# Patient Record
Sex: FEMALE | Race: BLACK OR AFRICAN AMERICAN | NOT HISPANIC OR LATINO | Employment: UNEMPLOYED | URBAN - NONMETROPOLITAN AREA
[De-identification: names, ages, dates, MRNs, and addresses within clinical notes are randomized per-mention and may not be internally consistent; named-entity substitution may affect disease eponyms.]

---

## 2023-04-17 DIAGNOSIS — D50.9 IRON DEFICIENCY ANEMIA, UNSPECIFIED IRON DEFICIENCY ANEMIA TYPE: Primary | ICD-10-CM

## 2023-04-19 ENCOUNTER — CONSULT (OUTPATIENT)
Dept: ONCOLOGY | Facility: CLINIC | Age: 28
End: 2023-04-19
Payer: COMMERCIAL

## 2023-04-19 ENCOUNTER — LAB (OUTPATIENT)
Dept: LAB | Facility: HOSPITAL | Age: 28
End: 2023-04-19
Payer: COMMERCIAL

## 2023-04-19 VITALS
WEIGHT: 21.8 LBS | HEART RATE: 90 BPM | BODY MASS INDEX: 3.5 KG/M2 | RESPIRATION RATE: 16 BRPM | SYSTOLIC BLOOD PRESSURE: 126 MMHG | DIASTOLIC BLOOD PRESSURE: 82 MMHG | OXYGEN SATURATION: 98 % | TEMPERATURE: 97.4 F | HEIGHT: 66 IN

## 2023-04-19 DIAGNOSIS — D50.9 IRON DEFICIENCY ANEMIA, UNSPECIFIED IRON DEFICIENCY ANEMIA TYPE: ICD-10-CM

## 2023-04-19 DIAGNOSIS — N92.0 EXCESSIVE AND FREQUENT MENSTRUATION: ICD-10-CM

## 2023-04-19 DIAGNOSIS — D50.9 IRON DEFICIENCY ANEMIA, UNSPECIFIED IRON DEFICIENCY ANEMIA TYPE: Primary | ICD-10-CM

## 2023-04-19 PROBLEM — R59.0 CERVICAL LYMPHADENOPATHY: Status: ACTIVE | Noted: 2022-06-14

## 2023-04-19 LAB
ALBUMIN SERPL-MCNC: 4.2 G/DL (ref 3.5–5.2)
ALBUMIN/GLOB SERPL: 1.3 G/DL
ALP SERPL-CCNC: 65 U/L (ref 39–117)
ALT SERPL W P-5'-P-CCNC: 7 U/L (ref 1–33)
ANION GAP SERPL CALCULATED.3IONS-SCNC: 10 MMOL/L (ref 5–15)
ANISOCYTOSIS BLD QL: ABNORMAL
AST SERPL-CCNC: 13 U/L (ref 1–32)
BILIRUB SERPL-MCNC: 0.6 MG/DL (ref 0–1.2)
BUN SERPL-MCNC: 6 MG/DL (ref 6–20)
BUN/CREAT SERPL: 10.3 (ref 7–25)
BURR CELLS BLD QL SMEAR: ABNORMAL
CALCIUM SPEC-SCNC: 9.1 MG/DL (ref 8.6–10.5)
CHLORIDE SERPL-SCNC: 103 MMOL/L (ref 98–107)
CO2 SERPL-SCNC: 24 MMOL/L (ref 22–29)
CREAT SERPL-MCNC: 0.58 MG/DL (ref 0.57–1)
DEPRECATED RDW RBC AUTO: 59.3 FL (ref 37–54)
EGFRCR SERPLBLD CKD-EPI 2021: 129 ML/MIN/1.73
EOSINOPHIL # BLD MANUAL: 0.04 10*3/MM3 (ref 0–0.4)
EOSINOPHIL NFR BLD MANUAL: 1 % (ref 0.3–6.2)
ERYTHROCYTE [DISTWIDTH] IN BLOOD BY AUTOMATED COUNT: 22.8 % (ref 12.3–15.4)
FERRITIN SERPL-MCNC: 24.09 NG/ML (ref 13–150)
GIANT PLATELETS: ABNORMAL
GLOBULIN UR ELPH-MCNC: 3.3 GM/DL
GLUCOSE SERPL-MCNC: 83 MG/DL (ref 65–99)
HCT VFR BLD AUTO: 42.9 % (ref 34–46.6)
HGB BLD-MCNC: 12.3 G/DL (ref 12–15.9)
HOLD SPECIMEN: NORMAL
IRON 24H UR-MRATE: 121 MCG/DL (ref 37–145)
IRON SATN MFR SERPL: 26 % (ref 20–50)
LYMPHOCYTES # BLD MANUAL: 2.52 10*3/MM3 (ref 0.7–3.1)
LYMPHOCYTES NFR BLD MANUAL: 5.1 % (ref 5–12)
MCH RBC QN AUTO: 21.3 PG (ref 26.6–33)
MCHC RBC AUTO-ENTMCNC: 28.7 G/DL (ref 31.5–35.7)
MCV RBC AUTO: 74.4 FL (ref 79–97)
MICROCYTES BLD QL: ABNORMAL
MONOCYTES # BLD: 0.22 10*3/MM3 (ref 0.1–0.9)
NEUTROPHILS # BLD AUTO: 1.62 10*3/MM3 (ref 1.7–7)
NEUTROPHILS NFR BLD MANUAL: 36.7 % (ref 42.7–76)
PLATELET # BLD AUTO: 407 10*3/MM3 (ref 140–450)
PMV BLD AUTO: 9.2 FL (ref 6–12)
POIKILOCYTOSIS BLD QL SMEAR: ABNORMAL
POLYCHROMASIA BLD QL SMEAR: ABNORMAL
POTASSIUM SERPL-SCNC: 3.8 MMOL/L (ref 3.5–5.2)
PROT SERPL-MCNC: 7.5 G/DL (ref 6–8.5)
RBC # BLD AUTO: 5.77 10*6/MM3 (ref 3.77–5.28)
SODIUM SERPL-SCNC: 137 MMOL/L (ref 136–145)
TIBC SERPL-MCNC: 465 MCG/DL (ref 298–536)
TRANSFERRIN SERPL-MCNC: 312 MG/DL (ref 200–360)
VARIANT LYMPHS NFR BLD MANUAL: 4.1 % (ref 0–5)
VARIANT LYMPHS NFR BLD MANUAL: 53.1 % (ref 19.6–45.3)
WBC MORPH BLD: NORMAL
WBC NRBC COR # BLD: 4.41 10*3/MM3 (ref 3.4–10.8)
WHOLE BLOOD HOLD SPECIMEN: NORMAL
WHOLE BLOOD HOLD SPECIMEN: NORMAL

## 2023-04-19 PROCEDURE — 84466 ASSAY OF TRANSFERRIN: CPT

## 2023-04-19 PROCEDURE — 85007 BL SMEAR W/DIFF WBC COUNT: CPT

## 2023-04-19 PROCEDURE — 85025 COMPLETE CBC W/AUTO DIFF WBC: CPT

## 2023-04-19 PROCEDURE — 83540 ASSAY OF IRON: CPT

## 2023-04-19 PROCEDURE — 80053 COMPREHEN METABOLIC PANEL: CPT

## 2023-04-19 PROCEDURE — 82728 ASSAY OF FERRITIN: CPT

## 2023-04-19 PROCEDURE — 36415 COLL VENOUS BLD VENIPUNCTURE: CPT

## 2023-04-19 RX ORDER — SEMAGLUTIDE 1.34 MG/ML
INJECTION, SOLUTION SUBCUTANEOUS
COMMUNITY

## 2023-04-19 RX ORDER — METFORMIN HYDROCHLORIDE 500 MG/1
TABLET, EXTENDED RELEASE ORAL
COMMUNITY

## 2023-04-19 RX ORDER — BUPROPION HYDROCHLORIDE 300 MG/1
TABLET ORAL
COMMUNITY
Start: 2023-03-01

## 2023-04-19 RX ORDER — PSEUDOEPHEDRINE HCL 30 MG
TABLET ORAL
COMMUNITY

## 2023-04-19 RX ORDER — FERROUS SULFATE 325(65) MG
TABLET ORAL
COMMUNITY

## 2023-04-19 RX ORDER — ONDANSETRON HYDROCHLORIDE 8 MG/1
TABLET, FILM COATED ORAL
COMMUNITY
Start: 2023-03-13

## 2023-04-19 NOTE — PROGRESS NOTES
"[facility] HEMATOLOGY & ONCOLOGY [place]  [address]  [facility] [address] [phone]    Patient Name: Tim Saenz  Encounter Date: 04/19/2023   YOB: 1995  Patient Number: [id number]    Initial Note    REASON FOR CONSULTATION: Patient states \" I had critically low iron.  Started iron and 6 weeks later it was still low  \".    HISTORY OF PRESENT ILLNESS: Tim Saenz is a 27 y.o. female referred by Kimberley Vanegas* for diagnostic and management recommendations for anemia. History is obtained from patient. History is considered to be accurate.    She has health history significant for DM and iron deficiency, depression/anxiety. She also reports frequent menstruation.     Previous labs reviewed 03/11/2023.  Hgb  10.9, Hct 37.6, Plt 430  1/29/23:   TIBC 456, Iron 20, Iron Sat 4, Ferritin 6    She had labs drawn today and results were reviewed with her in office.     LABS    Lab Results - Last 18 Months   Lab Units 04/19/23  0840   HEMOGLOBIN g/dL 12.3   HEMATOCRIT % 42.9   MCV fL 74.4*   WBC 10*3/mm3 4.41   RDW % 22.8*   MPV fL 9.2   PLATELETS 10*3/mm3 407   NEUTROS ABS 10*3/mm3 1.62*   EOS ABS 10*3/mm3 0.04   NEUTROPHIL % % 36.7*   MONOCYTES % % 5.1   ATYP LYMPH % % 4.1   ANISOCYTOSIS  Slight/1+   GIANT PLT  Slight/1+       Lab Results - Last 18 Months   Lab Units 04/19/23  0840   GLUCOSE mg/dL 83   SODIUM mmol/L 137   POTASSIUM mmol/L 3.8   CO2 mmol/L 24.0   CHLORIDE mmol/L 103   ANION GAP mmol/L 10.0   CREATININE mg/dL 0.58   BUN mg/dL 6   BUN / CREAT RATIO  10.3   CALCIUM mg/dL 9.1   ALK PHOS U/L 65   TOTAL PROTEIN g/dL 7.5   ALT (SGPT) U/L 7   AST (SGOT) U/L 13   BILIRUBIN mg/dL 0.6   ALBUMIN g/dL 4.2   GLOBULIN gm/dL 3.3       No results for input(s): MSPIKE, KAPPALAMB, IGLFLC, URICACID, FREEKAPPAL, CEA, LDH, REFLABREPO in the last 96046 hours.    Lab Results - Last 18 Months   Lab Units 04/19/23  0840   IRON mcg/dL " 121   TIBC mcg/dL 465   IRON SATURATION % 26   FERRITIN ng/mL 24.09         PAST MEDICAL HISTORY:  ALLERGIES:  Allergies   Allergen Reactions   • Naproxen GI Intolerance     CURRENT MEDICATIONS:  Outpatient Encounter Medications as of 4/19/2023   Medication Sig Dispense Refill   • buPROPion XL (WELLBUTRIN XL) 300 MG 24 hr tablet      • docusate sodium 100 MG capsule docusate sodium 100 mg capsule   Take 2 capsules every day by oral route for 30 days.     • ferrous sulfate 325 (65 FE) MG tablet FeroSul 325 mg (65 mg iron) tablet     • metFORMIN ER (GLUCOPHAGE-XR) 500 MG 24 hr tablet metformin  mg tablet,extended release 24 hr   Take 1 tablet every day by oral route for 90 days.     • ondansetron (ZOFRAN) 8 MG tablet      • Semaglutide, 1 MG/DOSE, (Ozempic, 1 MG/DOSE,) 4 MG/3ML solution pen-injector Ozempic 1 mg/dose (4 mg/3 mL) subcutaneous pen injector   Inject by subcutaneous route for 28 days.       No facility-administered encounter medications on file as of 4/19/2023.     ADULT ILLNESSES:  Patient Active Problem List   Diagnosis Code   • Cervical lymphadenopathy R59.0   • Excessive and frequent menstruation N92.0       HEALTH MAINTENANCE ITEMS:  Health Maintenance Due   Topic Date Due   • URINE MICROALBUMIN  Never done   • Pneumococcal Vaccine 0-64 (1 - PCV) Never done   • HPV VACCINES (1 - 2-dose series) Never done   • TDAP/TD VACCINES (1 - Tdap) Never done   • COVID-19 Vaccine (3 - Booster for Moderna series) 09/24/2021   • HEPATITIS C SCREENING  Never done   • ANNUAL PHYSICAL  Never done   • DIABETIC FOOT EXAM  Never done   • PAP SMEAR  Never done   • HEMOGLOBIN A1C  Never done   • DIABETIC EYE EXAM  Never done       <no information>  Last Completed Colonoscopy     This patient has no relevant Health Maintenance data.          There is no immunization history on file for this patient.  Last Completed Mammogram     This patient has no relevant Health Maintenance data.            FAMILY HISTORY:  History  "reviewed. No pertinent family history.  SOCIAL HISTORY:  Social History     Socioeconomic History   • Marital status: Single   Tobacco Use   • Smoking status: Never   • Smokeless tobacco: Never   Vaping Use   • Vaping Use: Never used   Substance and Sexual Activity   • Alcohol use: Yes     Comment: OCCASIONALLY   • Drug use: Never   • Sexual activity: Defer       REVIEW OF SYSTEMS:  Review of Systems   Constitutional: Negative for fatigue and fever.        \"I feel alright\"   HENT: Negative for trouble swallowing.    Respiratory: Negative for cough and shortness of breath.    Cardiovascular: Negative for chest pain, palpitations and leg swelling.   Gastrointestinal: Negative for nausea and vomiting.   Genitourinary: Positive for menstrual problem (Menorrhagia. ). Negative for hematuria.        Fibroids   Musculoskeletal: Negative for arthralgias and myalgias.   Skin: Negative for rash, skin lesions and wound.   Neurological: Negative for dizziness, syncope, memory problem and confusion.   Psychiatric/Behavioral: Positive for depressed mood. Negative for suicidal ideas. The patient is nervous/anxious.        /82   Pulse 90   Temp 97.4 °F (36.3 °C)   Resp 16   Ht 167.6 cm (66\")   Wt 9.888 kg (21 lb 12.8 oz)   SpO2 98%   BMI 3.52 kg/m²  Body surface area is 0.78 meters squared.    Pain Score    04/19/23 0849   PainSc: 0-No pain       Physical Exam:  Physical Exam  Constitutional:       Appearance: Normal appearance.   HENT:      Head: Normocephalic and atraumatic.   Cardiovascular:      Rate and Rhythm: Normal rate and regular rhythm.   Pulmonary:      Effort: Pulmonary effort is normal.      Breath sounds: Normal breath sounds.   Abdominal:      General: Bowel sounds are normal.      Palpations: Abdomen is soft.   Musculoskeletal:      Right lower leg: No edema.      Left lower leg: No edema.   Skin:     General: Skin is warm and dry.   Neurological:      Mental Status: She is alert and oriented to person, " place, and time.   Psychiatric:         Attention and Perception: Attention normal.         Mood and Affect: Mood normal.         Judgment: Judgment normal.         Tim Saenz reports a pain score of 0.  Given her pain assessment as noted, treatment options were discussed and the following options were decided upon as a follow-up plan to address the patient's pain: No intervention indicated..      ASSESSMENT / PLAN:    1. Iron deficiency anemia, unspecified iron deficiency anemia type    2. Excessive and frequent menstruation       1.  Iron Deficiency   -Pt is taking oral iron   -Hgb 12.3, Hct 42.9   -Iron 121, Ferritin 24,  Sat 2615, TIBC 465  -Continue current dose     2.  Menorrhagia  -Has appt with GYN May 1, 2023       PLAN:   1.   regarding the reason for the referral.   2.   regarding iron deficiency anemia   3.  Pt will continue oral iron once daily    4.  Continue current medications, follow up, treatment plans per PCP and any other provider.   5.  Return to office 6 weeks with pre office labs for CBC, CMP, Iron profile and Ferritin.  Orders have been placed   6.  Care discussed with patient.  Understanding expressed.  Patient agreeable with plan.      Thank you for the referral.    I spent 35 minutes caring for Tim on this date of service. This time includes time spent by me in the following activities: preparing for the visit, reviewing tests, performing a medically appropriate examination and/or evaluation, counseling and educating the patient/family/caregiver, ordering medications, tests, or procedures, documenting information in the medical record and care coordination.       Cecy Jin, APRN  04/19/2023

## 2023-04-24 ENCOUNTER — PATIENT ROUNDING (BHMG ONLY) (OUTPATIENT)
Dept: ONCOLOGY | Facility: CLINIC | Age: 28
End: 2023-04-24
Payer: COMMERCIAL

## 2023-04-24 NOTE — PROGRESS NOTES
April 24, 2023    Hello, may I speak with Tim Marquez?    My name is CLAIRE    I am  with [facility] HEMATOLOGY & ONCOLOGY [address]  [facility] [address] [phone].    Before we get started may I verify your date of birth? 1995    Rigoberto MARQUEZ. My name is Claire and I am calling from [facility]ematology/[facility]   DALIA BECKWITH’S office. I wanted to welcome you to our practice and ensure that your first visit went smoothly. If you have any questions or concerns, feel free to reach out to our practice manager Crystal directly at [phone]. Thank you, and have a great day!

## 2023-05-31 ENCOUNTER — DOCUMENTATION (OUTPATIENT)
Dept: PASTORAL CARE | Facility: HOSPITAL | Age: 28
End: 2023-05-31

## 2023-05-31 ENCOUNTER — OFFICE VISIT (OUTPATIENT)
Dept: ONCOLOGY | Facility: CLINIC | Age: 28
End: 2023-05-31

## 2023-05-31 ENCOUNTER — LAB (OUTPATIENT)
Dept: LAB | Facility: HOSPITAL | Age: 28
End: 2023-05-31

## 2023-05-31 VITALS
HEIGHT: 66 IN | BODY MASS INDEX: 34.27 KG/M2 | TEMPERATURE: 97.3 F | OXYGEN SATURATION: 98 % | HEART RATE: 100 BPM | WEIGHT: 213.2 LBS | SYSTOLIC BLOOD PRESSURE: 118 MMHG | DIASTOLIC BLOOD PRESSURE: 84 MMHG | RESPIRATION RATE: 16 BRPM

## 2023-05-31 DIAGNOSIS — D72.819 LEUKOPENIA, UNSPECIFIED TYPE: ICD-10-CM

## 2023-05-31 DIAGNOSIS — D50.8 OTHER IRON DEFICIENCY ANEMIA: Primary | ICD-10-CM

## 2023-05-31 DIAGNOSIS — D50.9 IRON DEFICIENCY ANEMIA, UNSPECIFIED IRON DEFICIENCY ANEMIA TYPE: Primary | ICD-10-CM

## 2023-05-31 DIAGNOSIS — N92.1 MENORRHAGIA WITH IRREGULAR CYCLE: ICD-10-CM

## 2023-05-31 DIAGNOSIS — D50.9 IRON DEFICIENCY ANEMIA, UNSPECIFIED IRON DEFICIENCY ANEMIA TYPE: ICD-10-CM

## 2023-05-31 LAB
ALBUMIN SERPL-MCNC: 4.1 G/DL (ref 3.5–5.2)
ALBUMIN/GLOB SERPL: 1.2 G/DL
ALP SERPL-CCNC: 66 U/L (ref 39–117)
ALT SERPL W P-5'-P-CCNC: 6 U/L (ref 1–33)
ANION GAP SERPL CALCULATED.3IONS-SCNC: 8 MMOL/L (ref 5–15)
AST SERPL-CCNC: 11 U/L (ref 1–32)
BASOPHILS # BLD AUTO: 0.05 10*3/MM3 (ref 0–0.2)
BASOPHILS NFR BLD AUTO: 1.6 % (ref 0–1.5)
BILIRUB SERPL-MCNC: 0.6 MG/DL (ref 0–1.2)
BUN SERPL-MCNC: 5 MG/DL (ref 6–20)
BUN/CREAT SERPL: 8.2 (ref 7–25)
CALCIUM SPEC-SCNC: 8.8 MG/DL (ref 8.6–10.5)
CHLORIDE SERPL-SCNC: 106 MMOL/L (ref 98–107)
CO2 SERPL-SCNC: 25 MMOL/L (ref 22–29)
CREAT SERPL-MCNC: 0.61 MG/DL (ref 0.57–1)
DEPRECATED RDW RBC AUTO: 47.6 FL (ref 37–54)
EGFRCR SERPLBLD CKD-EPI 2021: 127.4 ML/MIN/1.73
EOSINOPHIL # BLD AUTO: 0.08 10*3/MM3 (ref 0–0.4)
EOSINOPHIL NFR BLD AUTO: 2.6 % (ref 0.3–6.2)
ERYTHROCYTE [DISTWIDTH] IN BLOOD BY AUTOMATED COUNT: 17 % (ref 12.3–15.4)
FERRITIN SERPL-MCNC: 9.64 NG/ML (ref 13–150)
GLOBULIN UR ELPH-MCNC: 3.3 GM/DL
GLUCOSE SERPL-MCNC: 87 MG/DL (ref 65–99)
HCT VFR BLD AUTO: 42 % (ref 34–46.6)
HGB BLD-MCNC: 12.7 G/DL (ref 12–15.9)
HOLD SPECIMEN: NORMAL
IMM GRANULOCYTES # BLD AUTO: 0 10*3/MM3 (ref 0–0.05)
IMM GRANULOCYTES NFR BLD AUTO: 0 % (ref 0–0.5)
IRON 24H UR-MRATE: 214 MCG/DL (ref 37–145)
IRON SATN MFR SERPL: 48 % (ref 20–50)
LYMPHOCYTES # BLD AUTO: 1.61 10*3/MM3 (ref 0.7–3.1)
LYMPHOCYTES NFR BLD AUTO: 51.6 % (ref 19.6–45.3)
MCH RBC QN AUTO: 23.3 PG (ref 26.6–33)
MCHC RBC AUTO-ENTMCNC: 30.2 G/DL (ref 31.5–35.7)
MCV RBC AUTO: 76.9 FL (ref 79–97)
MONOCYTES # BLD AUTO: 0.23 10*3/MM3 (ref 0.1–0.9)
MONOCYTES NFR BLD AUTO: 7.4 % (ref 5–12)
NEUTROPHILS NFR BLD AUTO: 1.15 10*3/MM3 (ref 1.7–7)
NEUTROPHILS NFR BLD AUTO: 36.8 % (ref 42.7–76)
NRBC BLD AUTO-RTO: 0 /100 WBC (ref 0–0.2)
PLATELET # BLD AUTO: 348 10*3/MM3 (ref 140–450)
PMV BLD AUTO: 9.4 FL (ref 6–12)
POTASSIUM SERPL-SCNC: 3.8 MMOL/L (ref 3.5–5.2)
PROT SERPL-MCNC: 7.4 G/DL (ref 6–8.5)
RBC # BLD AUTO: 5.46 10*6/MM3 (ref 3.77–5.28)
SODIUM SERPL-SCNC: 139 MMOL/L (ref 136–145)
TIBC SERPL-MCNC: 446 MCG/DL (ref 298–536)
TRANSFERRIN SERPL-MCNC: 299 MG/DL (ref 200–360)
WBC NRBC COR # BLD: 3.12 10*3/MM3 (ref 3.4–10.8)

## 2023-05-31 PROCEDURE — 83540 ASSAY OF IRON: CPT

## 2023-05-31 PROCEDURE — 82728 ASSAY OF FERRITIN: CPT

## 2023-05-31 PROCEDURE — 80053 COMPREHEN METABOLIC PANEL: CPT

## 2023-05-31 PROCEDURE — 36415 COLL VENOUS BLD VENIPUNCTURE: CPT

## 2023-05-31 PROCEDURE — 85025 COMPLETE CBC W/AUTO DIFF WBC: CPT

## 2023-05-31 PROCEDURE — 84466 ASSAY OF TRANSFERRIN: CPT

## 2023-05-31 NOTE — PROGRESS NOTES
[facility] HEMATOLOGY & ONCOLOGY [place]  [address]  [facility] [address] [phone]    Patient Name: Tim Saenz  Encounter Date: 05/31/2023   YOB: 1995  Patient Number: [id number]    PROGRESS NOTE    HISTORY OF PRESENT ILLNESS: Tmi Saenz is a 27 y.o. female followed by this office for iron deficiency anemia. History is obtained from patient. History is considered to be accurate.    She has health history significant for DM and iron deficiency, depression/anxiety. She also reports frequent menstruation r/t fibroids which is managed by GYN.  She is taking oral iron once daily.     INTERVAL HISTORY     Pt presents to clinic for continued follow up.  She has seen GYN and is being referred to [facility]. She had labs today and results were reviewed with patient in office.     LABS    Lab Results - Last 18 Months   Lab Units 05/31/23  0849 04/19/23  0840   HEMOGLOBIN g/dL 12.7 12.3   HEMATOCRIT % 42.0 42.9   MCV fL 76.9* 74.4*   WBC 10*3/mm3 3.12* 4.41   RDW % 17.0* 22.8*   MPV fL 9.4 9.2   PLATELETS 10*3/mm3 348 407   IMM GRAN % % 0.0  --    NEUTROS ABS 10*3/mm3 1.15* 1.62*   LYMPHS ABS 10*3/mm3 1.61  --    MONOS ABS 10*3/mm3 0.23  --    EOS ABS 10*3/mm3 0.08 0.04   BASOS ABS 10*3/mm3 0.05  --    IMMATURE GRANS (ABS) 10*3/mm3 0.00  --    NRBC /100 WBC 0.0  --    NEUTROPHIL % %  --  36.7*   MONOCYTES % %  --  5.1   ATYP LYMPH % %  --  4.1   ANISOCYTOSIS   --  Slight/1+   GIANT PLT   --  Slight/1+       Lab Results - Last 18 Months   Lab Units 05/31/23  0849 04/19/23  0840   GLUCOSE mg/dL 87 83   SODIUM mmol/L 139 137   POTASSIUM mmol/L 3.8 3.8   CO2 mmol/L 25.0 24.0   CHLORIDE mmol/L 106 103   ANION GAP mmol/L 8.0 10.0   CREATININE mg/dL 0.61 0.58   BUN mg/dL 5* 6   BUN / CREAT RATIO  8.2 10.3   CALCIUM mg/dL 8.8 9.1   ALK PHOS U/L 66 65   TOTAL PROTEIN g/dL 7.4 7.5   ALT (SGPT) U/L 6 7   AST (SGOT) U/L 11 13   BILIRUBIN  mg/dL 0.6 0.6   ALBUMIN g/dL 4.1 4.2   GLOBULIN gm/dL 3.3 3.3       No results for input(s): MSPIKE, KAPPALAMB, IGLFLC, URICACID, FREEKAPPAL, CEA, LDH, REFLABREPO in the last 67467 hours.    Lab Results - Last 18 Months   Lab Units 05/31/23  0849 04/19/23  0840   IRON mcg/dL 214* 121   TIBC mcg/dL 446 465   IRON SATURATION % 48 26   FERRITIN ng/mL 9.64* 24.09         PAST MEDICAL HISTORY:  ALLERGIES:  Allergies   Allergen Reactions    Naproxen GI Intolerance     CURRENT MEDICATIONS:  Outpatient Encounter Medications as of 5/31/2023   Medication Sig Dispense Refill    buPROPion XL (WELLBUTRIN XL) 300 MG 24 hr tablet       docusate sodium 100 MG capsule docusate sodium 100 mg capsule   Take 2 capsules every day by oral route for 30 days.      ferrous sulfate 325 (65 FE) MG tablet FeroSul 325 mg (65 mg iron) tablet      metFORMIN ER (GLUCOPHAGE-XR) 500 MG 24 hr tablet metformin  mg tablet,extended release 24 hr   Take 1 tablet every day by oral route for 90 days.      ondansetron (ZOFRAN) 8 MG tablet       Semaglutide, 1 MG/DOSE, (Ozempic, 1 MG/DOSE,) 4 MG/3ML solution pen-injector Ozempic 1 mg/dose (4 mg/3 mL) subcutaneous pen injector   Inject by subcutaneous route for 28 days.       No facility-administered encounter medications on file as of 5/31/2023.     ADULT ILLNESSES:  Patient Active Problem List   Diagnosis Code    Cervical lymphadenopathy R59.0    Excessive and frequent menstruation N92.0       HEALTH MAINTENANCE ITEMS:  Health Maintenance Due   Topic Date Due    URINE MICROALBUMIN  Never done    Pneumococcal Vaccine 0-64 (1 - PCV) Never done    HPV VACCINES (1 - 2-dose series) Never done    TDAP/TD VACCINES (1 - Tdap) Never done    COVID-19 Vaccine (3 - Booster for Moderna series) 09/24/2021    HEPATITIS C SCREENING  Never done    ANNUAL PHYSICAL  Never done    DIABETIC FOOT EXAM  Never done    PAP SMEAR  Never done    HEMOGLOBIN A1C  Never done    DIABETIC EYE EXAM  Never done       <no  "information>  Last Completed Colonoscopy       This patient has no relevant Health Maintenance data.            There is no immunization history on file for this patient.  Last Completed Mammogram       This patient has no relevant Health Maintenance data.              FAMILY HISTORY:  History reviewed. No pertinent family history.  SOCIAL HISTORY:  Social History     Socioeconomic History    Marital status: Single   Tobacco Use    Smoking status: Never    Smokeless tobacco: Never   Vaping Use    Vaping Use: Never used   Substance and Sexual Activity    Alcohol use: Yes     Comment: OCCASIONALLY    Drug use: Never    Sexual activity: Defer       REVIEW OF SYSTEMS:  Review of Systems   Constitutional:  Negative for fatigue and fever.        \"I feel alright\"   HENT:  Negative for trouble swallowing.    Respiratory:  Negative for cough and shortness of breath.    Cardiovascular:  Negative for chest pain, palpitations and leg swelling.   Gastrointestinal:  Negative for nausea and vomiting.   Genitourinary:  Positive for menstrual problem (Menorrhagia. ). Negative for hematuria.        Fibroids   Musculoskeletal:  Negative for arthralgias and myalgias.   Skin:  Negative for rash, skin lesions and wound.   Neurological:  Negative for dizziness, syncope, memory problem and confusion.   Psychiatric/Behavioral:  Positive for depressed mood. Negative for suicidal ideas. The patient is nervous/anxious.      /84   Pulse 100   Temp 97.3 °F (36.3 °C)   Resp 16   Ht 167.6 cm (66\")   Wt 96.7 kg (213 lb 3.2 oz)   SpO2 98%   BMI 34.41 kg/m²  Body surface area is 2.06 meters squared.    Pain Score    05/31/23 0854   PainSc: 0-No pain       Physical Exam  Constitutional:       Appearance: Normal appearance.   HENT:      Head: Normocephalic and atraumatic.   Cardiovascular:      Rate and Rhythm: Normal rate and regular rhythm.   Pulmonary:      Effort: Pulmonary effort is normal.      Breath sounds: Normal breath sounds. "   Abdominal:      General: Bowel sounds are normal.      Palpations: Abdomen is soft.   Musculoskeletal:      Right lower leg: No edema.      Left lower leg: No edema.   Skin:     General: Skin is warm and dry.   Neurological:      Mental Status: She is alert and oriented to person, place, and time.   Psychiatric:         Attention and Perception: Attention normal.         Mood and Affect: Mood normal.         Judgment: Judgment normal.       Tim Saenz reports a pain score of 0.  Given her pain assessment as noted, treatment options were discussed and the following options were decided upon as a follow-up plan to address the patient's pain:  No intervention indicated. .      ASSESSMENT / PLAN:    1. Iron deficiency anemia, unspecified iron deficiency anemia type    2. Menorrhagia with irregular cycle    3. Leukopenia, unspecified type       1.  Iron Deficiency   -Pt is taking oral iron once daily  -Hgb 12.7, Hct 42.0, Iron 214, Ferritin 9.64, Sat 48%, TIBC 446  -Ferritin is low, serum iron is elevated.  Pt recently had menstrual cycle  -Continue current dose of oral iron and recheck labs in 6 weeks    2.  Menorrhagia  Saw GYN. Pt has fibroids and has been referred to GYN in [place]..    3.  Leukopenia  -WBC today 3.12, ANC 1.15  -Acute finding. Asymptomatic. Will monitor    PLAN:  Stable for observation  Continue current medications, treatment plans and follow up with PCP and any other providers  Labs only in 6 weeks  Return to office 3 months  Pre-office labs for CBC, CMP, Iron Profile, Ferritin   Orders have been signed  Care discussed with patient.  Understanding expressed.  Patient agreeable with plan.          Cecy Jin, JEFF  05/31/2023

## 2023-06-01 NOTE — ACP (ADVANCE CARE PLANNING)
Date of Advanced Steps ACP conversation: 5/31/23  Location of conversation: [facility]  ACP Facilitator: Wilfrid Rock  Referral Source: Consult  Time spent in conversation and documentation: 31-60 minutes.    Documents Completed:                    Living Will Directive:YES    Decisional Capacity/who will participate in conversation:        Individual has decisional capacity: YES           SUMMARY OF CONVERSATION:    Ms. Saenz completed her Living Will Directive with my assistance.  She chose her mother, Shaye Saenz, as her surrogate.  She understood the content and intent of the document.  I notarized it and gave her some copies and the original.  Sent a copy to HIM.

## 2023-08-31 ENCOUNTER — LAB (OUTPATIENT)
Dept: LAB | Facility: HOSPITAL | Age: 28
End: 2023-08-31
Payer: COMMERCIAL

## 2023-08-31 ENCOUNTER — OFFICE VISIT (OUTPATIENT)
Dept: ONCOLOGY | Facility: CLINIC | Age: 28
End: 2023-08-31
Payer: COMMERCIAL

## 2023-08-31 VITALS
DIASTOLIC BLOOD PRESSURE: 88 MMHG | HEART RATE: 87 BPM | BODY MASS INDEX: 33.72 KG/M2 | OXYGEN SATURATION: 99 % | HEIGHT: 66 IN | SYSTOLIC BLOOD PRESSURE: 126 MMHG | TEMPERATURE: 97.1 F | RESPIRATION RATE: 16 BRPM | WEIGHT: 209.8 LBS

## 2023-08-31 DIAGNOSIS — D50.9 IRON DEFICIENCY ANEMIA, UNSPECIFIED IRON DEFICIENCY ANEMIA TYPE: ICD-10-CM

## 2023-08-31 DIAGNOSIS — D50.9 IRON DEFICIENCY ANEMIA, UNSPECIFIED IRON DEFICIENCY ANEMIA TYPE: Primary | ICD-10-CM

## 2023-08-31 DIAGNOSIS — N92.1 MENORRHAGIA WITH IRREGULAR CYCLE: ICD-10-CM

## 2023-08-31 DIAGNOSIS — D25.9 UTERINE LEIOMYOMA, UNSPECIFIED LOCATION: Primary | ICD-10-CM

## 2023-08-31 LAB
ALBUMIN SERPL-MCNC: 4.3 G/DL (ref 3.5–5.2)
ALBUMIN/GLOB SERPL: 1.4 G/DL
ALP SERPL-CCNC: 69 U/L (ref 39–117)
ALT SERPL W P-5'-P-CCNC: 6 U/L (ref 1–33)
ANION GAP SERPL CALCULATED.3IONS-SCNC: 9 MMOL/L (ref 5–15)
AST SERPL-CCNC: 12 U/L (ref 1–32)
BASOPHILS # BLD AUTO: 0.05 10*3/MM3 (ref 0–0.2)
BASOPHILS NFR BLD AUTO: 1.3 % (ref 0–1.5)
BILIRUB SERPL-MCNC: 0.7 MG/DL (ref 0–1.2)
BUN SERPL-MCNC: 8 MG/DL (ref 6–20)
BUN/CREAT SERPL: 12.3 (ref 7–25)
CALCIUM SPEC-SCNC: 8.4 MG/DL (ref 8.6–10.5)
CHLORIDE SERPL-SCNC: 103 MMOL/L (ref 98–107)
CO2 SERPL-SCNC: 26 MMOL/L (ref 22–29)
CREAT SERPL-MCNC: 0.65 MG/DL (ref 0.57–1)
DEPRECATED RDW RBC AUTO: 42.1 FL (ref 37–54)
EGFRCR SERPLBLD CKD-EPI 2021: 124.7 ML/MIN/1.73
EOSINOPHIL # BLD AUTO: 0.11 10*3/MM3 (ref 0–0.4)
EOSINOPHIL NFR BLD AUTO: 2.8 % (ref 0.3–6.2)
ERYTHROCYTE [DISTWIDTH] IN BLOOD BY AUTOMATED COUNT: 14 % (ref 12.3–15.4)
FERRITIN SERPL-MCNC: 29.06 NG/ML (ref 13–150)
GLOBULIN UR ELPH-MCNC: 3 GM/DL
GLUCOSE SERPL-MCNC: 75 MG/DL (ref 65–99)
HCT VFR BLD AUTO: 43.8 % (ref 34–46.6)
HGB BLD-MCNC: 13.1 G/DL (ref 12–15.9)
HOLD SPECIMEN: NORMAL
IMM GRANULOCYTES # BLD AUTO: 0 10*3/MM3 (ref 0–0.05)
IMM GRANULOCYTES NFR BLD AUTO: 0 % (ref 0–0.5)
IRON 24H UR-MRATE: 234 MCG/DL (ref 37–145)
IRON SATN MFR SERPL: 60 % (ref 20–50)
LYMPHOCYTES # BLD AUTO: 1.83 10*3/MM3 (ref 0.7–3.1)
LYMPHOCYTES NFR BLD AUTO: 47.3 % (ref 19.6–45.3)
MCH RBC QN AUTO: 24.7 PG (ref 26.6–33)
MCHC RBC AUTO-ENTMCNC: 29.9 G/DL (ref 31.5–35.7)
MCV RBC AUTO: 82.5 FL (ref 79–97)
MONOCYTES # BLD AUTO: 0.3 10*3/MM3 (ref 0.1–0.9)
MONOCYTES NFR BLD AUTO: 7.8 % (ref 5–12)
NEUTROPHILS NFR BLD AUTO: 1.58 10*3/MM3 (ref 1.7–7)
NEUTROPHILS NFR BLD AUTO: 40.8 % (ref 42.7–76)
NRBC BLD AUTO-RTO: 0 /100 WBC (ref 0–0.2)
PLATELET # BLD AUTO: 317 10*3/MM3 (ref 140–450)
PMV BLD AUTO: 9.1 FL (ref 6–12)
POTASSIUM SERPL-SCNC: 4.2 MMOL/L (ref 3.5–5.2)
PROT SERPL-MCNC: 7.3 G/DL (ref 6–8.5)
RBC # BLD AUTO: 5.31 10*6/MM3 (ref 3.77–5.28)
SODIUM SERPL-SCNC: 138 MMOL/L (ref 136–145)
TIBC SERPL-MCNC: 389 MCG/DL (ref 298–536)
TRANSFERRIN SERPL-MCNC: 261 MG/DL (ref 200–360)
WBC NRBC COR # BLD: 3.87 10*3/MM3 (ref 3.4–10.8)

## 2023-08-31 PROCEDURE — 84466 ASSAY OF TRANSFERRIN: CPT

## 2023-08-31 PROCEDURE — 85025 COMPLETE CBC W/AUTO DIFF WBC: CPT

## 2023-08-31 PROCEDURE — 82728 ASSAY OF FERRITIN: CPT

## 2023-08-31 PROCEDURE — 36415 COLL VENOUS BLD VENIPUNCTURE: CPT

## 2023-08-31 PROCEDURE — 83540 ASSAY OF IRON: CPT

## 2023-08-31 PROCEDURE — 80053 COMPREHEN METABOLIC PANEL: CPT

## 2023-08-31 RX ORDER — TIRZEPATIDE 5 MG/.5ML
INJECTION, SOLUTION SUBCUTANEOUS
COMMUNITY
Start: 2023-08-21

## 2023-08-31 NOTE — PROGRESS NOTES
MG[facility] ONC [facility] HEMATOLOGY & ONCOLOGY [place]  [address]  [facility] [address] [phone]    Patient Name: Tim Saenz  Encounter Date: 08/31/2023   YOB: 1995  Patient Number: [id number]    PROGRESS NOTE    HISTORY OF PRESENT ILLNESS: Tim Saenz is a 28 y.o. female followed by this office for iron deficiency anemia. History is obtained from patient. History is considered to be accurate.    She has health history significant for DM and iron deficiency, depression/anxiety. She also reports frequent menstruation r/t fibroids which is managed by GYN.  She is taking oral iron once daily.     INTERVAL HISTORY     Pt presents to clinic for continued follow up. She has no acute complaints today.  She has been seen by GYN at [facility] [place] and is planning to have fibroids removed in October.     She had labs drawn toddy and results were reviewed with her in office.      LABS    Lab Results - Last 18 Months   Lab Units 08/31/23  0916 05/31/23  0849 04/19/23  0840   HEMOGLOBIN g/dL 13.1 12.7 12.3   HEMATOCRIT % 43.8 42.0 42.9   MCV fL 82.5 76.9* 74.4*   WBC 10*3/mm3 3.87 3.12* 4.41   RDW % 14.0 17.0* 22.8*   MPV fL 9.1 9.4 9.2   PLATELETS 10*3/mm3 317 348 407   IMM GRAN % % 0.0 0.0  --    NEUTROS ABS 10*3/mm3 1.58* 1.15* 1.62*   LYMPHS ABS 10*3/mm3 1.83 1.61  --    MONOS ABS 10*3/mm3 0.30 0.23  --    EOS ABS 10*3/mm3 0.11 0.08 0.04   BASOS ABS 10*3/mm3 0.05 0.05  --    IMMATURE GRANS (ABS) 10*3/mm3 0.00 0.00  --    NRBC /100 WBC 0.0 0.0  --    NEUTROPHIL % %  --   --  36.7*   MONOCYTES % %  --   --  5.1   ATYP LYMPH % %  --   --  4.1   ANISOCYTOSIS   --   --  Slight/1+   GIANT PLT   --   --  Slight/1+       Lab Results - Last 18 Months   Lab Units 08/31/23  0916 05/31/23  0849 04/19/23  0840   GLUCOSE mg/dL 75 87 83   SODIUM mmol/L 138 139 137   POTASSIUM mmol/L 4.2 3.8 3.8   CO2 mmol/L 26.0 25.0 24.0   CHLORIDE mmol/L 103 106 103   ANION GAP  mmol/L 9.0 8.0 10.0   CREATININE mg/dL 0.65 0.61 0.58   BUN mg/dL 8 5* 6   BUN / CREAT RATIO  12.3 8.2 10.3   CALCIUM mg/dL 8.4* 8.8 9.1   ALK PHOS U/L 69 66 65   TOTAL PROTEIN g/dL 7.3 7.4 7.5   ALT (SGPT) U/L 6 6 7   AST (SGOT) U/L 12 11 13   BILIRUBIN mg/dL 0.7 0.6 0.6   ALBUMIN g/dL 4.3 4.1 4.2   GLOBULIN gm/dL 3.0 3.3 3.3       No results for input(s): MSPIKE, KAPPALAMB, IGLFLC, URICACID, FREEKAPPAL, CEA, LDH, REFLABREPO in the last 89391 hours.    Lab Results - Last 18 Months   Lab Units 08/31/23  0916 05/31/23  0849 04/19/23  0840   IRON mcg/dL 234* 214* 121   TIBC mcg/dL 389 446 465   IRON SATURATION (TSAT) % 60* 48 26   FERRITIN ng/mL 29.06 9.64* 24.09         PAST MEDICAL HISTORY:  ALLERGIES:  Allergies   Allergen Reactions    Naproxen GI Intolerance     CURRENT MEDICATIONS:  Outpatient Encounter Medications as of 8/31/2023   Medication Sig Dispense Refill    buPROPion XL (WELLBUTRIN XL) 300 MG 24 hr tablet       docusate sodium 100 MG capsule docusate sodium 100 mg capsule   Take 2 capsules every day by oral route for 30 days.      ferrous sulfate 325 (65 FE) MG tablet FeroSul 325 mg (65 mg iron) tablet      Mounjaro 5 MG/0.5ML solution pen-injector       ondansetron (ZOFRAN) 8 MG tablet       [DISCONTINUED] metFORMIN ER (GLUCOPHAGE-XR) 500 MG 24 hr tablet metformin  mg tablet,extended release 24 hr   Take 1 tablet every day by oral route for 90 days. (Patient not taking: Reported on 8/31/2023)      [DISCONTINUED] Semaglutide, 1 MG/DOSE, (Ozempic, 1 MG/DOSE,) 4 MG/3ML solution pen-injector Ozempic 1 mg/dose (4 mg/3 mL) subcutaneous pen injector   Inject by subcutaneous route for 28 days. (Patient not taking: Reported on 8/31/2023)       No facility-administered encounter medications on file as of 8/31/2023.     ADULT ILLNESSES:  Patient Active Problem List   Diagnosis Code    Cervical lymphadenopathy R59.0    Excessive and frequent menstruation N92.0       HEALTH MAINTENANCE ITEMS:  Health  "Maintenance Due   Topic Date Due    URINE MICROALBUMIN  Never done    BMI FOLLOWUP  Never done    Pneumococcal Vaccine 0-64 (1 - PCV) Never done    HPV VACCINES (1 - 2-dose series) Never done    TDAP/TD VACCINES (1 - Tdap) Never done    COVID-19 Vaccine (3 - Moderna series) 09/24/2021    HEPATITIS C SCREENING  Never done    ANNUAL PHYSICAL  Never done    DIABETIC FOOT EXAM  Never done    PAP SMEAR  Never done    HEMOGLOBIN A1C  Never done    DIABETIC EYE EXAM  Never done       <no information>  Last Completed Colonoscopy       This patient has no relevant Health Maintenance data.          Immunization History   Administered Date(s) Administered    COVID-19 (MODERNA) 1st,2nd,3rd Dose Monovalent 07/02/2021, 07/30/2021     Last Completed Mammogram       This patient has no relevant Health Maintenance data.              FAMILY HISTORY:  History reviewed. No pertinent family history.  SOCIAL HISTORY:  Social History     Socioeconomic History    Marital status: Single   Tobacco Use    Smoking status: Never    Smokeless tobacco: Never   Vaping Use    Vaping Use: Never used   Substance and Sexual Activity    Alcohol use: Yes     Comment: OCCASIONALLY    Drug use: Never    Sexual activity: Defer       REVIEW OF SYSTEMS:  Review of Systems   Constitutional:  Negative for fatigue and fever.        \"I feel alright\"   HENT:  Negative for trouble swallowing.    Respiratory:  Negative for cough and shortness of breath.    Cardiovascular:  Negative for chest pain, palpitations and leg swelling.   Gastrointestinal:  Negative for nausea and vomiting.   Genitourinary:  Positive for menstrual problem (Menorrhagia. ). Negative for hematuria.        Fibroids   Musculoskeletal:  Negative for arthralgias and myalgias.   Skin:  Negative for rash, skin lesions and wound.   Neurological:  Negative for dizziness, syncope, memory problem and confusion.   Psychiatric/Behavioral:  Positive for depressed mood. Negative for suicidal ideas. The " "patient is nervous/anxious.      /88   Pulse 87   Temp 97.1 øF (36.2 øC)   Resp 16   Ht 167.6 cm (66\")   Wt 95.2 kg (209 lb 12.8 oz)   SpO2 99%   BMI 33.86 kg/mý  Body surface area is 2.04 meters squared.    Pain Score    08/31/23 1003   PainSc: 0-No pain       Physical Exam  Constitutional:       Appearance: Normal appearance.   HENT:      Head: Normocephalic and atraumatic.   Cardiovascular:      Rate and Rhythm: Normal rate and regular rhythm.   Pulmonary:      Effort: Pulmonary effort is normal.      Breath sounds: Normal breath sounds.   Abdominal:      General: Bowel sounds are normal.      Palpations: Abdomen is soft.   Musculoskeletal:      Right lower leg: No edema.      Left lower leg: No edema.   Skin:     General: Skin is warm and dry.   Neurological:      Mental Status: She is alert and oriented to person, place, and time.   Psychiatric:         Attention and Perception: Attention normal.         Mood and Affect: Mood normal.         Judgment: Judgment normal.       Tim Saenz reports a pain score of 0.  Given her pain assessment as noted, treatment options were discussed and the following options were decided upon as a follow-up plan to address the patient's pain:  No intervention indicated. .      ASSESSMENT / PLAN:    1. Uterine leiomyoma, unspecified location    2. Iron deficiency anemia, unspecified iron deficiency anemia type    3. Menorrhagia with irregular cycle       1.  Iron Deficiency   -Pt is taking oral iron once daily  -Hgb 13.1, Hct 43.8 Iron 234, Ferritin 29.06, Sat 60%, TIBC 389  -Continue current dose of oral iron as pt is scheduled for surgery     2.  Menorrhagia  -PT is scheduled on 10/30/23 for laparoscopic myomectomy, possible open myomectomy, diagnostic hysteroscopy, possible hysteroscopic myomectomy, D&C, chromopertubation, possible laparotomy and all other indicated procedures.   -Gilbert Marino MD         PLAN:  Stable for observation  Continue current " medications, treatment plans and follow up with PCP and any other providers  Return to office 3 months  Pre-office labs for CBC, CMP, Iron Profile, Ferritin   Orders have been signed  Care discussed with patient.  Understanding expressed.  Patient agreeable with plan.          Cecy Jin, APRN  08/31/2023

## 2023-11-30 ENCOUNTER — LAB (OUTPATIENT)
Dept: LAB | Facility: HOSPITAL | Age: 28
End: 2023-11-30
Payer: COMMERCIAL

## 2023-11-30 ENCOUNTER — OFFICE VISIT (OUTPATIENT)
Dept: ONCOLOGY | Facility: CLINIC | Age: 28
End: 2023-11-30
Payer: COMMERCIAL

## 2023-11-30 VITALS
TEMPERATURE: 97.3 F | RESPIRATION RATE: 16 BRPM | SYSTOLIC BLOOD PRESSURE: 126 MMHG | HEIGHT: 66 IN | HEART RATE: 71 BPM | DIASTOLIC BLOOD PRESSURE: 84 MMHG | WEIGHT: 209 LBS | OXYGEN SATURATION: 96 % | BODY MASS INDEX: 33.59 KG/M2

## 2023-11-30 DIAGNOSIS — N92.1 MENORRHAGIA WITH IRREGULAR CYCLE: ICD-10-CM

## 2023-11-30 DIAGNOSIS — D50.9 IRON DEFICIENCY ANEMIA, UNSPECIFIED IRON DEFICIENCY ANEMIA TYPE: Primary | ICD-10-CM

## 2023-11-30 LAB
ALBUMIN SERPL-MCNC: 3.9 G/DL (ref 3.5–5.2)
ALBUMIN/GLOB SERPL: 1.3 G/DL
ALP SERPL-CCNC: 63 U/L (ref 39–117)
ALT SERPL W P-5'-P-CCNC: 5 U/L (ref 1–33)
ANION GAP SERPL CALCULATED.3IONS-SCNC: 7 MMOL/L (ref 5–15)
AST SERPL-CCNC: 10 U/L (ref 1–32)
BASOPHILS # BLD MANUAL: 0.07 10*3/MM3 (ref 0–0.2)
BASOPHILS NFR BLD MANUAL: 2 % (ref 0–1.5)
BILIRUB SERPL-MCNC: 0.9 MG/DL (ref 0–1.2)
BUN SERPL-MCNC: 8 MG/DL (ref 6–20)
BUN/CREAT SERPL: 12.9 (ref 7–25)
CALCIUM SPEC-SCNC: 8.5 MG/DL (ref 8.6–10.5)
CHLORIDE SERPL-SCNC: 102 MMOL/L (ref 98–107)
CO2 SERPL-SCNC: 29 MMOL/L (ref 22–29)
CREAT SERPL-MCNC: 0.62 MG/DL (ref 0.57–1)
DEPRECATED RDW RBC AUTO: 38.3 FL (ref 37–54)
EGFRCR SERPLBLD CKD-EPI 2021: 126.1 ML/MIN/1.73
EOSINOPHIL # BLD MANUAL: 0.07 10*3/MM3 (ref 0–0.4)
EOSINOPHIL NFR BLD MANUAL: 2 % (ref 0.3–6.2)
ERYTHROCYTE [DISTWIDTH] IN BLOOD BY AUTOMATED COUNT: 12.6 % (ref 12.3–15.4)
FERRITIN SERPL-MCNC: 48.08 NG/ML (ref 13–150)
GLOBULIN UR ELPH-MCNC: 3.1 GM/DL
GLUCOSE SERPL-MCNC: 93 MG/DL (ref 65–99)
HCT VFR BLD AUTO: 42.6 % (ref 34–46.6)
HGB BLD-MCNC: 12.9 G/DL (ref 12–15.9)
HOLD SPECIMEN: NORMAL
IRON 24H UR-MRATE: 63 MCG/DL (ref 37–145)
IRON SATN MFR SERPL: 18 % (ref 20–50)
LYMPHOCYTES # BLD MANUAL: 1.79 10*3/MM3 (ref 0.7–3.1)
MCH RBC QN AUTO: 25.4 PG (ref 26.6–33)
MCHC RBC AUTO-ENTMCNC: 30.3 G/DL (ref 31.5–35.7)
MCV RBC AUTO: 83.9 FL (ref 79–97)
NEUTROPHILS # BLD AUTO: 1.51 10*3/MM3 (ref 1.7–7)
NEUTROPHILS NFR BLD MANUAL: 43.9 % (ref 42.7–76)
PLAT MORPH BLD: NORMAL
PLATELET # BLD AUTO: 284 10*3/MM3 (ref 140–450)
PMV BLD AUTO: 8.8 FL (ref 6–12)
POIKILOCYTOSIS BLD QL SMEAR: ABNORMAL
POTASSIUM SERPL-SCNC: 3.6 MMOL/L (ref 3.5–5.2)
PROT SERPL-MCNC: 7 G/DL (ref 6–8.5)
RBC # BLD AUTO: 5.08 10*6/MM3 (ref 3.77–5.28)
SODIUM SERPL-SCNC: 138 MMOL/L (ref 136–145)
TIBC SERPL-MCNC: 353 MCG/DL (ref 298–536)
TRANSFERRIN SERPL-MCNC: 237 MG/DL (ref 200–360)
VARIANT LYMPHS NFR BLD MANUAL: 42.9 % (ref 19.6–45.3)
VARIANT LYMPHS NFR BLD MANUAL: 9.2 % (ref 0–5)
WBC MORPH BLD: NORMAL
WBC NRBC COR # BLD AUTO: 3.44 10*3/MM3 (ref 3.4–10.8)

## 2023-11-30 PROCEDURE — 84466 ASSAY OF TRANSFERRIN: CPT

## 2023-11-30 PROCEDURE — 82728 ASSAY OF FERRITIN: CPT

## 2023-11-30 PROCEDURE — 85007 BL SMEAR W/DIFF WBC COUNT: CPT

## 2023-11-30 PROCEDURE — 36415 COLL VENOUS BLD VENIPUNCTURE: CPT

## 2023-11-30 PROCEDURE — 80053 COMPREHEN METABOLIC PANEL: CPT

## 2023-11-30 PROCEDURE — 83540 ASSAY OF IRON: CPT

## 2023-11-30 PROCEDURE — 85025 COMPLETE CBC W/AUTO DIFF WBC: CPT

## 2023-11-30 NOTE — PROGRESS NOTES
MGW ONC [facility] HEMATOLOGY & ONCOLOGY [place]  [address]  [facility] [address] [phone]    Patient Name: Tim Saenz  Encounter Date: 11/30/2023   YOB: 1995  Patient Number: [id number]    PROGRESS NOTE    HISTORY OF PRESENT ILLNESS: Tim Saenz is a 28 y.o. female followed by this office for iron deficiency anemia. History is obtained from patient. History is considered to be accurate.    She has health history significant for DM and iron deficiency, depression/anxiety. She also reports frequent menstruation r/t fibroids which is managed by GYN.  She is taking oral iron once daily.     INTERVAL HISTORY     Pt presents to clinic for continued follow up. She has no acute complaints today.  She was scheduled to have fibroids removed at [facility] in October but provider had family emergency and needed to reschedule.     She had labs drawn today and results were reviewed with her in office.      LABS    Lab Results - Last 18 Months   Lab Units 11/30/23  0904 08/31/23  0916 05/31/23  0849 04/19/23  0840   HEMOGLOBIN g/dL 12.9 13.1 12.7 12.3   HEMATOCRIT % 42.6 43.8 42.0 42.9   MCV fL 83.9 82.5 76.9* 74.4*   WBC 10*3/mm3 3.44 3.87 3.12* 4.41   RDW % 12.6 14.0 17.0* 22.8*   MPV fL 8.8 9.1 9.4 9.2   PLATELETS 10*3/mm3 284 317 348 407   IMM GRAN % %  --  0.0 0.0  --    NEUTROS ABS 10*3/mm3 1.51* 1.58* 1.15* 1.62*   LYMPHS ABS 10*3/mm3  --  1.83 1.61  --    MONOS ABS 10*3/mm3  --  0.30 0.23  --    EOS ABS 10*3/mm3 0.07 0.11 0.08 0.04   BASOS ABS 10*3/mm3 0.07 0.05 0.05  --    IMMATURE GRANS (ABS) 10*3/mm3  --  0.00 0.00  --    NRBC /100 WBC  --  0.0 0.0  --    NEUTROPHIL % % 43.9  --   --  36.7*   MONOCYTES % %  --   --   --  5.1   BASOPHIL % % 2.0*  --   --   --    ATYP LYMPH % % 9.2*  --   --  4.1   ANISOCYTOSIS   --   --   --  Slight/1+   GIANT PLT   --   --   --  Slight/1+       Lab Results - Last 18 Months   Lab Units 11/30/23  0939  "08/31/23  0916 05/31/23  0849 04/19/23  0840   GLUCOSE mg/dL 93 75 87 83   SODIUM mmol/L 138 138 139 137   POTASSIUM mmol/L 3.6 4.2 3.8 3.8   CO2 mmol/L 29.0 26.0 25.0 24.0   CHLORIDE mmol/L 102 103 106 103   ANION GAP mmol/L 7.0 9.0 8.0 10.0   CREATININE mg/dL 0.62 0.65 0.61 0.58   BUN mg/dL 8 8 5* 6   BUN / CREAT RATIO  12.9 12.3 8.2 10.3   CALCIUM mg/dL 8.5* 8.4* 8.8 9.1   ALK PHOS U/L 63 69 66 65   TOTAL PROTEIN g/dL 7.0 7.3 7.4 7.5   ALT (SGPT) U/L 5 6 6 7   AST (SGOT) U/L 10 12 11 13   BILIRUBIN mg/dL 0.9 0.7 0.6 0.6   ALBUMIN g/dL 3.9 4.3 4.1 4.2   GLOBULIN gm/dL 3.1 3.0 3.3 3.3       No results for input(s): \"MSPIKE\", \"KAPPALAMB\", \"IGLFLC\", \"URICACID\", \"FREEKAPPAL\", \"CEA\", \"LDH\", \"REFLABREPO\" in the last 75504 hours.    Lab Results - Last 18 Months   Lab Units 11/30/23  0904 08/31/23  0916 05/31/23  0849 04/19/23  0840   IRON mcg/dL 63 234* 214* 121   TIBC mcg/dL 353 389 446 465   IRON SATURATION (TSAT) % 18* 60* 48 26   FERRITIN ng/mL 48.08 29.06 9.64* 24.09         PAST MEDICAL HISTORY:  ALLERGIES:  Allergies   Allergen Reactions    Naproxen GI Intolerance     CURRENT MEDICATIONS:  Outpatient Encounter Medications as of 11/30/2023   Medication Sig Dispense Refill    buPROPion XL (WELLBUTRIN XL) 300 MG 24 hr tablet       docusate sodium 100 MG capsule docusate sodium 100 mg capsule   Take 2 capsules every day by oral route for 30 days.      ferrous sulfate 325 (65 FE) MG tablet Take 1 tablet by mouth Daily With Breakfast.      Mounjaro 5 MG/0.5ML solution pen-injector       ondansetron (ZOFRAN) 8 MG tablet        No facility-administered encounter medications on file as of 11/30/2023.     ADULT ILLNESSES:  Patient Active Problem List   Diagnosis Code    Cervical lymphadenopathy R59.0    Excessive and frequent menstruation N92.0       HEALTH MAINTENANCE ITEMS:  Health Maintenance Due   Topic Date Due    URINE MICROALBUMIN  Never done    BMI FOLLOWUP  Never done    Pneumococcal Vaccine 0-64 (1 - PCV) Never " "done    HPV VACCINES (1 - 2-dose series) Never done    TDAP/TD VACCINES (1 - Tdap) Never done    HEPATITIS C SCREENING  Never done    ANNUAL PHYSICAL  Never done    DIABETIC FOOT EXAM  Never done    PAP SMEAR  Never done    HEMOGLOBIN A1C  Never done    DIABETIC EYE EXAM  Never done    INFLUENZA VACCINE  08/01/2023    COVID-19 Vaccine (3 - 2023-24 season) 09/01/2023       <no information>  Last Completed Colonoscopy       This patient has no relevant Health Maintenance data.          Immunization History   Administered Date(s) Administered    COVID-19 (MODERNA) 1st,2nd,3rd Dose Monovalent 07/02/2021, 07/30/2021     Last Completed Mammogram       This patient has no relevant Health Maintenance data.              FAMILY HISTORY:  History reviewed. No pertinent family history.  SOCIAL HISTORY:  Social History     Socioeconomic History    Marital status: Single   Tobacco Use    Smoking status: Never    Smokeless tobacco: Never   Vaping Use    Vaping Use: Never used   Substance and Sexual Activity    Alcohol use: Yes     Comment: OCCASIONALLY    Drug use: Never    Sexual activity: Defer       REVIEW OF SYSTEMS:  Review of Systems   Constitutional:  Negative for fatigue and fever.        \"I feel alright\"   HENT:  Negative for trouble swallowing.    Respiratory:  Negative for cough and shortness of breath.    Cardiovascular:  Negative for chest pain, palpitations and leg swelling.   Gastrointestinal:  Negative for nausea and vomiting.   Endocrine: Positive for polyuria.   Genitourinary:  Positive for menstrual problem (Menorrhagia. ). Negative for hematuria.        Fibroids   Musculoskeletal:  Negative for arthralgias and myalgias.   Skin:  Negative for rash, skin lesions and wound.   Neurological:  Negative for dizziness, syncope, memory problem and confusion.   Psychiatric/Behavioral:  Positive for depressed mood. Negative for suicidal ideas. The patient is nervous/anxious.        /84   Pulse 71   Temp 97.3 °F " "(36.3 °C)   Resp 16   Ht 167.6 cm (66\")   Wt 94.8 kg (209 lb)   SpO2 96%   BMI 33.73 kg/m²  Body surface area is 2.04 meters squared.    Pain Score    11/30/23 0909   PainSc: 0-No pain         Physical Exam  Constitutional:       Appearance: Normal appearance.   HENT:      Head: Normocephalic and atraumatic.   Cardiovascular:      Rate and Rhythm: Normal rate and regular rhythm.   Pulmonary:      Effort: Pulmonary effort is normal.      Breath sounds: Normal breath sounds.   Abdominal:      General: Bowel sounds are normal.      Palpations: Abdomen is soft.   Musculoskeletal:      Right lower leg: No edema.      Left lower leg: No edema.   Skin:     General: Skin is warm and dry.   Neurological:      Mental Status: She is alert and oriented to person, place, and time.   Psychiatric:         Attention and Perception: Attention normal.         Mood and Affect: Mood normal.         Judgment: Judgment normal.         Tim Saenz reports a pain score of 0.  Given her pain assessment as noted, treatment options were discussed and the following options were decided upon as a follow-up plan to address the patient's pain:  No intervention indicated. .      ASSESSMENT / PLAN:    1. Iron deficiency anemia, unspecified iron deficiency anemia type    2. Menorrhagia with irregular cycle         1.  Iron Deficiency   -Pt is taking oral iron once daily  -Labs today: Hgb 12.9, Hct 42.6, Iron 63, Ferritin 48, Sat 18%, TIBC 353  -Stable for observation     2.  Menorrhagia  -PT was scheduled on 10/30/23 for laparoscopic myomectomy in [place] but provider had to reschedule. Gilbert Marino MD   -Has consultation appt with local GYN surgeon        PLAN:  Stable for observation  Continue current medications, treatment plans and follow up with PCP and any other providers  Labs only in 6 weeks   Return to office 3 months  Pre-office labs for CBC, CMP, Iron Profile, Ferritin   Orders have been signed  Care discussed with patient.  " Understanding expressed.  Patient agreeable with plan.          Cecy Jin, APRN  11/30/2023

## 2024-01-11 ENCOUNTER — OFFICE VISIT (OUTPATIENT)
Dept: OBSTETRICS AND GYNECOLOGY | Facility: CLINIC | Age: 29
End: 2024-01-11
Payer: COMMERCIAL

## 2024-01-11 ENCOUNTER — LAB (OUTPATIENT)
Dept: LAB | Facility: HOSPITAL | Age: 29
End: 2024-01-11
Payer: COMMERCIAL

## 2024-01-11 VITALS
DIASTOLIC BLOOD PRESSURE: 82 MMHG | BODY MASS INDEX: 34.23 KG/M2 | SYSTOLIC BLOOD PRESSURE: 118 MMHG | HEIGHT: 66 IN | WEIGHT: 213 LBS

## 2024-01-11 DIAGNOSIS — D50.9 IRON DEFICIENCY ANEMIA, UNSPECIFIED IRON DEFICIENCY ANEMIA TYPE: ICD-10-CM

## 2024-01-11 DIAGNOSIS — K21.01 GASTROESOPHAGEAL REFLUX DISEASE WITH ESOPHAGITIS AND HEMORRHAGE: ICD-10-CM

## 2024-01-11 DIAGNOSIS — N94.6 DYSMENORRHEA: ICD-10-CM

## 2024-01-11 DIAGNOSIS — E11.9 TYPE 2 DIABETES MELLITUS WITHOUT COMPLICATION, WITHOUT LONG-TERM CURRENT USE OF INSULIN: ICD-10-CM

## 2024-01-11 DIAGNOSIS — F32.5 MAJOR DEPRESSIVE DISORDER IN FULL REMISSION, UNSPECIFIED WHETHER RECURRENT: ICD-10-CM

## 2024-01-11 DIAGNOSIS — N92.0 MENORRHAGIA WITH REGULAR CYCLE: Primary | ICD-10-CM

## 2024-01-11 LAB
ALBUMIN SERPL-MCNC: 3.9 G/DL (ref 3.5–5.2)
ALBUMIN/GLOB SERPL: 1.2 G/DL
ALP SERPL-CCNC: 66 U/L (ref 39–117)
ALT SERPL W P-5'-P-CCNC: 6 U/L (ref 1–33)
ANION GAP SERPL CALCULATED.3IONS-SCNC: 7 MMOL/L (ref 5–15)
AST SERPL-CCNC: 11 U/L (ref 1–32)
BILIRUB SERPL-MCNC: 0.3 MG/DL (ref 0–1.2)
BUN SERPL-MCNC: 6 MG/DL (ref 6–20)
BUN/CREAT SERPL: 9.4 (ref 7–25)
CALCIUM SPEC-SCNC: 8.4 MG/DL (ref 8.6–10.5)
CHLORIDE SERPL-SCNC: 105 MMOL/L (ref 98–107)
CO2 SERPL-SCNC: 26 MMOL/L (ref 22–29)
CREAT SERPL-MCNC: 0.64 MG/DL (ref 0.57–1)
DEPRECATED RDW RBC AUTO: 38.7 FL (ref 37–54)
EGFRCR SERPLBLD CKD-EPI 2021: 125.2 ML/MIN/1.73
EOSINOPHIL # BLD MANUAL: 0.07 10*3/MM3 (ref 0–0.4)
EOSINOPHIL NFR BLD MANUAL: 2 % (ref 0.3–6.2)
ERYTHROCYTE [DISTWIDTH] IN BLOOD BY AUTOMATED COUNT: 13 % (ref 12.3–15.4)
FERRITIN SERPL-MCNC: 41.01 NG/ML (ref 13–150)
GIANT PLATELETS: ABNORMAL
GLOBULIN UR ELPH-MCNC: 3.3 GM/DL
GLUCOSE SERPL-MCNC: 86 MG/DL (ref 65–99)
HCT VFR BLD AUTO: 41.6 % (ref 34–46.6)
HGB BLD-MCNC: 12.8 G/DL (ref 12–15.9)
IRON 24H UR-MRATE: 35 MCG/DL (ref 37–145)
IRON SATN MFR SERPL: 10 % (ref 20–50)
LYMPHOCYTES # BLD MANUAL: 1.39 10*3/MM3 (ref 0.7–3.1)
LYMPHOCYTES NFR BLD MANUAL: 7 % (ref 5–12)
MCH RBC QN AUTO: 25.4 PG (ref 26.6–33)
MCHC RBC AUTO-ENTMCNC: 30.8 G/DL (ref 31.5–35.7)
MCV RBC AUTO: 82.7 FL (ref 79–97)
MONOCYTES # BLD: 0.24 10*3/MM3 (ref 0.1–0.9)
NEUTROPHILS # BLD AUTO: 1.7 10*3/MM3 (ref 1.7–7)
NEUTROPHILS NFR BLD MANUAL: 49 % (ref 42.7–76)
NEUTS BAND NFR BLD MANUAL: 1 % (ref 0–5)
PLATELET # BLD AUTO: 261 10*3/MM3 (ref 140–450)
PMV BLD AUTO: 8.9 FL (ref 6–12)
POTASSIUM SERPL-SCNC: 3.8 MMOL/L (ref 3.5–5.2)
PROT SERPL-MCNC: 7.2 G/DL (ref 6–8.5)
RBC # BLD AUTO: 5.03 10*6/MM3 (ref 3.77–5.28)
RBC MORPH BLD: NORMAL
SODIUM SERPL-SCNC: 138 MMOL/L (ref 136–145)
TIBC SERPL-MCNC: 349 MCG/DL (ref 298–536)
TRANSFERRIN SERPL-MCNC: 234 MG/DL (ref 200–360)
VARIANT LYMPHS NFR BLD MANUAL: 34 % (ref 19.6–45.3)
VARIANT LYMPHS NFR BLD MANUAL: 7 % (ref 0–5)
WBC MORPH BLD: NORMAL
WBC NRBC COR # BLD AUTO: 3.4 10*3/MM3 (ref 3.4–10.8)

## 2024-01-11 PROCEDURE — 85025 COMPLETE CBC W/AUTO DIFF WBC: CPT

## 2024-01-11 PROCEDURE — 83540 ASSAY OF IRON: CPT

## 2024-01-11 PROCEDURE — 36415 COLL VENOUS BLD VENIPUNCTURE: CPT

## 2024-01-11 PROCEDURE — 84466 ASSAY OF TRANSFERRIN: CPT

## 2024-01-11 PROCEDURE — 82728 ASSAY OF FERRITIN: CPT

## 2024-01-11 PROCEDURE — 80053 COMPREHEN METABOLIC PANEL: CPT

## 2024-01-11 PROCEDURE — 85007 BL SMEAR W/DIFF WBC COUNT: CPT

## 2024-01-11 RX ORDER — BUSPIRONE HYDROCHLORIDE 5 MG/1
TABLET ORAL
COMMUNITY
Start: 2024-01-02

## 2024-01-11 RX ORDER — OMEPRAZOLE 40 MG/1
CAPSULE, DELAYED RELEASE ORAL 2 TIMES DAILY
COMMUNITY

## 2024-01-11 RX ORDER — BUPROPION HYDROCHLORIDE 150 MG/1
TABLET ORAL
COMMUNITY
Start: 2024-01-03

## 2024-01-11 RX ORDER — BLOOD-GLUCOSE METER
KIT MISCELLANEOUS
COMMUNITY
Start: 2023-12-23

## 2024-01-11 RX ORDER — LANCETS 28 GAUGE
EACH MISCELLANEOUS
COMMUNITY
Start: 2023-12-23

## 2024-01-11 NOTE — PROGRESS NOTES
"Subjective   Chief Complaint   Patient presents with    [facility]     Pt here today as new pt to discuss heavy periods. Pt voices that this has been going on for a couple of years. Pt also has pain during that time. Pt voices no other concerns.      Tim Saenz is a 28 y.o. year old No obstetric history on file..  Patient's last menstrual period was 12/20/2023 (exact date).  She presents to be seen because of heavy menses that include large clots.  Tim reports that bleeding seemed to be worse over the past year.  Patient has already seen Dr. Hurley in [place] (and someone else before that), but was referred to [place] for uterine fibroids.  Patient reports she had both an u/s and an MRI in [place].   Patient was referred to [facility] to see a specialist there for myomectomy; she was scheduled last October and her sugery was cancelled just 5 days prior to her surgery date.  The patient is not interested in going back there because it is so far away, and because there was a lot of hassle with appointments being cancelled and rescheduled so many time.  The patient reports that her surgery was planned to be done laparoscopically.    Patient seeing Heme/Onc for chronic blood loss anemia due to her menorrhagia.  She has not required a transfusion yet.  Menses are regular and occur ever 23-25 days.  Flow is excessively heavy.    The following portions of the patient's history were reviewed and updated as appropriate:current medications and allergies    Social History    Tobacco Use      Smoking status: Never      Smokeless tobacco: Never    Review of Systems   Constitutional:  Negative for activity change and unexpected weight change.   Respiratory:  Negative for shortness of breath.    Cardiovascular:  Negative for chest pain.   Genitourinary:  Positive for menstrual problem (heavy, painful, with large clots).         Objective   /82   Ht 167.6 cm (66\")   Wt 96.6 kg (213 lb)   LMP 12/20/2023 (Exact " Date)   BMI 34.38 kg/m²     Physical Exam  Vitals and nursing note reviewed.   Constitutional:       General: She is not in acute distress.     Appearance: She is well-developed.   HENT:      Head: Normocephalic and atraumatic.   Neck:      Thyroid: No thyromegaly.   Pulmonary:      Effort: Pulmonary effort is normal.   Musculoskeletal:         General: Normal range of motion.      Cervical back: Normal range of motion.   Skin:     General: Skin is warm and dry.   Neurological:      Mental Status: She is alert and oriented to person, place, and time.   Psychiatric:         Mood and Affect: Mood normal.         Behavior: Behavior normal.         Thought Content: Thought content normal.         Judgment: Judgment normal.         Lab Review   No data reviewed    Imaging   No data reviewed : patient signing records release today, to get u/s and MRI from [place].    Assessment & Plan    Diagnoses and all orders for this visit:    1. Menorrhagia with regular cycle (Primary): Patient is already had an MRI and a pelvic ultrasound at [facility], but those records are not available in Care Everywhere.  The patient is sending a records release today will return to the office in 4 weeks.  We have tentatively planned for her to have a [facility] laparoscopic myomectomy, since that is what she was planned for previously.  We will pick her surgery date when the patient returns to the office for a final preop discussion.  I have confirmed that the patient understands  delivery will be necessary for any future pregnancies.    2. Dysmenorrhea    3. Type 2 diabetes mellitus without complication, without long-term current use of insulin: Patient previously on metformin, and has been transitioned to Mounjaro.  She reports that diabetes is well-controlled.  She has never been on insulin    4. Major depressive disorder in full remission, unspecified whether recurrent: Symptoms well-controlled with medication    5.  Gastroesophageal reflux disease with esophagitis and hemorrhage       This note was electronically signed.    Elmira Avitia MD  January 11, 2024  08:38 CST    Total time spent today with Tim  was 30-44 minutes (level 3).  Greater than 50% of the time was spent coordinating care, answering her questions and counseling regarding pathophysiology of her presenting problem along with plans for any diagnositc work-up and treatment.

## 2024-01-12 ENCOUNTER — TELEPHONE (OUTPATIENT)
Dept: ONCOLOGY | Facility: CLINIC | Age: 29
End: 2024-01-12
Payer: COMMERCIAL

## 2024-01-12 DIAGNOSIS — D50.9 IRON DEFICIENCY ANEMIA, UNSPECIFIED IRON DEFICIENCY ANEMIA TYPE: Primary | ICD-10-CM

## 2024-01-12 RX ORDER — FERROUS SULFATE 325(65) MG
325 TABLET ORAL 2 TIMES DAILY
Qty: 60 TABLET | Refills: 2 | Status: SHIPPED | OUTPATIENT
Start: 2024-01-12

## 2024-01-12 NOTE — TELEPHONE ENCOUNTER
----- Message from JEFF Nelson sent at 1/12/2024  8:26 AM CST -----  Can you see if she is still taking her iron.  It is trending down.

## 2024-02-08 ENCOUNTER — TELEPHONE (OUTPATIENT)
Dept: ONCOLOGY | Facility: CLINIC | Age: 29
End: 2024-02-08
Payer: COMMERCIAL

## 2024-02-08 PROBLEM — D50.9 IRON DEFICIENCY ANEMIA: Status: ACTIVE | Noted: 2024-02-08

## 2024-02-08 RX ORDER — FAMOTIDINE 10 MG/ML
20 INJECTION, SOLUTION INTRAVENOUS AS NEEDED
OUTPATIENT
Start: 2024-02-17

## 2024-02-08 RX ORDER — DIPHENHYDRAMINE HYDROCHLORIDE 50 MG/ML
50 INJECTION INTRAMUSCULAR; INTRAVENOUS AS NEEDED
OUTPATIENT
Start: 2024-02-16

## 2024-02-08 RX ORDER — ACETAMINOPHEN 325 MG/1
650 TABLET ORAL ONCE
OUTPATIENT
Start: 2024-02-16

## 2024-02-08 RX ORDER — SODIUM CHLORIDE 9 MG/ML
20 INJECTION, SOLUTION INTRAVENOUS ONCE
OUTPATIENT
Start: 2024-02-16

## 2024-02-08 RX ORDER — FAMOTIDINE 10 MG/ML
20 INJECTION, SOLUTION INTRAVENOUS AS NEEDED
OUTPATIENT
Start: 2024-02-16

## 2024-02-08 RX ORDER — DIPHENHYDRAMINE HCL 25 MG
25 CAPSULE ORAL ONCE
OUTPATIENT
Start: 2024-02-16

## 2024-02-08 RX ORDER — DIPHENHYDRAMINE HCL 25 MG
25 CAPSULE ORAL ONCE
OUTPATIENT
Start: 2024-02-17

## 2024-02-08 RX ORDER — SODIUM CHLORIDE 9 MG/ML
20 INJECTION, SOLUTION INTRAVENOUS ONCE
OUTPATIENT
Start: 2024-02-17

## 2024-02-08 RX ORDER — ACETAMINOPHEN 325 MG/1
650 TABLET ORAL ONCE
OUTPATIENT
Start: 2024-02-17

## 2024-02-08 RX ORDER — DIPHENHYDRAMINE HYDROCHLORIDE 50 MG/ML
50 INJECTION INTRAMUSCULAR; INTRAVENOUS AS NEEDED
OUTPATIENT
Start: 2024-02-17

## 2024-02-08 NOTE — TELEPHONE ENCOUNTER
----- Message from JEFF Nelson sent at 2/7/2024  2:33 PM CST -----  Sure do  venofer 200 mg x 2  ----- Message -----  From: Ericka Arrington CMA  Sent: 2/7/2024   1:59 PM CST  To: JEFF Nelson    Pt increased orak iron to BID but it is making her throw up,She did say she would be willing to try IV iron. Is this something you want to try since she can't tolerate the oral?

## 2024-02-08 NOTE — TELEPHONE ENCOUNTER
PT HAS SET UP IRON INFUSION FOR 2/16. SHE CANNOT SCHEDULE THE 2ND ONE AT THIS TIME BUT WILL CALL WHEN SHE HAS HER TRANSPORTATION WORKED OUT TO GET THAT 2ND ONE SET UP

## 2024-02-16 ENCOUNTER — INFUSION (OUTPATIENT)
Dept: ONCOLOGY | Facility: HOSPITAL | Age: 29
End: 2024-02-16
Payer: COMMERCIAL

## 2024-02-16 ENCOUNTER — OFFICE VISIT (OUTPATIENT)
Dept: OBSTETRICS AND GYNECOLOGY | Age: 29
End: 2024-02-16
Payer: COMMERCIAL

## 2024-02-16 ENCOUNTER — TELEPHONE (OUTPATIENT)
Dept: OBSTETRICS AND GYNECOLOGY | Age: 29
End: 2024-02-16
Payer: COMMERCIAL

## 2024-02-16 VITALS
WEIGHT: 218 LBS | RESPIRATION RATE: 17 BRPM | SYSTOLIC BLOOD PRESSURE: 116 MMHG | HEIGHT: 66 IN | OXYGEN SATURATION: 100 % | BODY MASS INDEX: 35.03 KG/M2 | DIASTOLIC BLOOD PRESSURE: 78 MMHG | HEART RATE: 79 BPM | TEMPERATURE: 97.2 F

## 2024-02-16 VITALS
BODY MASS INDEX: 34.72 KG/M2 | SYSTOLIC BLOOD PRESSURE: 118 MMHG | WEIGHT: 216 LBS | DIASTOLIC BLOOD PRESSURE: 78 MMHG | HEIGHT: 66 IN

## 2024-02-16 DIAGNOSIS — N92.0 MENORRHAGIA WITH REGULAR CYCLE: Primary | ICD-10-CM

## 2024-02-16 DIAGNOSIS — K21.01 GASTROESOPHAGEAL REFLUX DISEASE WITH ESOPHAGITIS AND HEMORRHAGE: ICD-10-CM

## 2024-02-16 DIAGNOSIS — D50.0 IRON DEFICIENCY ANEMIA DUE TO CHRONIC BLOOD LOSS: ICD-10-CM

## 2024-02-16 DIAGNOSIS — E11.9 TYPE 2 DIABETES MELLITUS WITHOUT COMPLICATION, WITHOUT LONG-TERM CURRENT USE OF INSULIN: ICD-10-CM

## 2024-02-16 DIAGNOSIS — D50.9 IRON DEFICIENCY ANEMIA, UNSPECIFIED IRON DEFICIENCY ANEMIA TYPE: Primary | ICD-10-CM

## 2024-02-16 DIAGNOSIS — D25.9 UTERINE LEIOMYOMA, UNSPECIFIED LOCATION: ICD-10-CM

## 2024-02-16 DIAGNOSIS — N94.6 DYSMENORRHEA: ICD-10-CM

## 2024-02-16 PROCEDURE — 96365 THER/PROPH/DIAG IV INF INIT: CPT

## 2024-02-16 PROCEDURE — 63710000001 DIPHENHYDRAMINE PER 50 MG: Performed by: NURSE PRACTITIONER

## 2024-02-16 PROCEDURE — 25810000003 SODIUM CHLORIDE 0.9 % SOLUTION: Performed by: NURSE PRACTITIONER

## 2024-02-16 PROCEDURE — 96374 THER/PROPH/DIAG INJ IV PUSH: CPT

## 2024-02-16 PROCEDURE — 25010000002 IRON SUCROSE PER 1 MG: Performed by: NURSE PRACTITIONER

## 2024-02-16 RX ORDER — DIPHENHYDRAMINE HCL 25 MG
25 CAPSULE ORAL ONCE
Status: COMPLETED | OUTPATIENT
Start: 2024-02-16 | End: 2024-02-16

## 2024-02-16 RX ORDER — PHENAZOPYRIDINE HYDROCHLORIDE 100 MG/1
200 TABLET, FILM COATED ORAL ONCE
OUTPATIENT
Start: 2024-02-16 | End: 2024-02-16

## 2024-02-16 RX ORDER — SODIUM CHLORIDE 0.9 % (FLUSH) 0.9 %
10 SYRINGE (ML) INJECTION AS NEEDED
OUTPATIENT
Start: 2024-02-16

## 2024-02-16 RX ORDER — GABAPENTIN 100 MG/1
600 CAPSULE ORAL ONCE
OUTPATIENT
Start: 2024-02-16 | End: 2024-02-16

## 2024-02-16 RX ORDER — ACETAMINOPHEN 325 MG/1
650 TABLET ORAL ONCE
Status: COMPLETED | OUTPATIENT
Start: 2024-02-16 | End: 2024-02-16

## 2024-02-16 RX ORDER — FAMOTIDINE 10 MG/ML
20 INJECTION, SOLUTION INTRAVENOUS AS NEEDED
Status: DISCONTINUED | OUTPATIENT
Start: 2024-02-16 | End: 2024-02-16 | Stop reason: HOSPADM

## 2024-02-16 RX ORDER — SODIUM CHLORIDE 9 MG/ML
40 INJECTION, SOLUTION INTRAVENOUS AS NEEDED
OUTPATIENT
Start: 2024-02-16

## 2024-02-16 RX ORDER — ACETAMINOPHEN 500 MG
1000 TABLET ORAL ONCE
OUTPATIENT
Start: 2024-02-16 | End: 2024-02-16

## 2024-02-16 RX ORDER — SODIUM CHLORIDE 0.9 % (FLUSH) 0.9 %
10 SYRINGE (ML) INJECTION EVERY 12 HOURS SCHEDULED
OUTPATIENT
Start: 2024-02-16

## 2024-02-16 RX ORDER — DIPHENHYDRAMINE HYDROCHLORIDE 50 MG/ML
50 INJECTION INTRAMUSCULAR; INTRAVENOUS AS NEEDED
Status: DISCONTINUED | OUTPATIENT
Start: 2024-02-16 | End: 2024-02-16 | Stop reason: HOSPADM

## 2024-02-16 RX ORDER — SODIUM CHLORIDE 9 MG/ML
20 INJECTION, SOLUTION INTRAVENOUS ONCE
Status: COMPLETED | OUTPATIENT
Start: 2024-02-16 | End: 2024-02-16

## 2024-02-16 RX ORDER — SCOLOPAMINE TRANSDERMAL SYSTEM 1 MG/1
1 PATCH, EXTENDED RELEASE TRANSDERMAL CONTINUOUS
OUTPATIENT
Start: 2024-02-16 | End: 2024-02-19

## 2024-02-16 RX ORDER — VORTIOXETINE 5 MG/1
TABLET, FILM COATED ORAL
COMMUNITY
Start: 2024-02-13

## 2024-02-16 RX ADMIN — DIPHENHYDRAMINE HYDROCHLORIDE 25 MG: 25 CAPSULE ORAL at 11:49

## 2024-02-16 RX ADMIN — SODIUM CHLORIDE 20 ML/HR: 9 INJECTION, SOLUTION INTRAVENOUS at 11:49

## 2024-02-16 RX ADMIN — ACETAMINOPHEN 650 MG: 325 TABLET, FILM COATED ORAL at 11:49

## 2024-02-16 RX ADMIN — IRON SUCROSE 200 MG: 20 INJECTION, SOLUTION INTRAVENOUS at 12:10

## 2024-02-29 ENCOUNTER — LAB (OUTPATIENT)
Dept: LAB | Facility: HOSPITAL | Age: 29
End: 2024-02-29
Payer: COMMERCIAL

## 2024-02-29 ENCOUNTER — INFUSION (OUTPATIENT)
Dept: ONCOLOGY | Facility: HOSPITAL | Age: 29
End: 2024-02-29
Payer: COMMERCIAL

## 2024-02-29 ENCOUNTER — OFFICE VISIT (OUTPATIENT)
Dept: ONCOLOGY | Facility: CLINIC | Age: 29
End: 2024-02-29
Payer: COMMERCIAL

## 2024-02-29 ENCOUNTER — PRE-ADMISSION TESTING (OUTPATIENT)
Dept: PREADMISSION TESTING | Facility: HOSPITAL | Age: 29
End: 2024-02-29
Payer: COMMERCIAL

## 2024-02-29 VITALS
BODY MASS INDEX: 35.89 KG/M2 | HEIGHT: 66 IN | SYSTOLIC BLOOD PRESSURE: 139 MMHG | DIASTOLIC BLOOD PRESSURE: 81 MMHG | OXYGEN SATURATION: 99 % | RESPIRATION RATE: 18 BRPM | HEART RATE: 88 BPM | WEIGHT: 223.33 LBS

## 2024-02-29 VITALS
TEMPERATURE: 97.6 F | BODY MASS INDEX: 35.79 KG/M2 | OXYGEN SATURATION: 96 % | HEART RATE: 98 BPM | WEIGHT: 222.7 LBS | HEIGHT: 66 IN | SYSTOLIC BLOOD PRESSURE: 118 MMHG | DIASTOLIC BLOOD PRESSURE: 84 MMHG | RESPIRATION RATE: 16 BRPM

## 2024-02-29 VITALS
WEIGHT: 222 LBS | OXYGEN SATURATION: 99 % | SYSTOLIC BLOOD PRESSURE: 117 MMHG | RESPIRATION RATE: 18 BRPM | HEIGHT: 66 IN | BODY MASS INDEX: 35.68 KG/M2 | HEART RATE: 75 BPM | DIASTOLIC BLOOD PRESSURE: 81 MMHG | TEMPERATURE: 97.3 F

## 2024-02-29 DIAGNOSIS — N92.1 MENORRHAGIA WITH IRREGULAR CYCLE: ICD-10-CM

## 2024-02-29 DIAGNOSIS — D50.9 IRON DEFICIENCY ANEMIA, UNSPECIFIED IRON DEFICIENCY ANEMIA TYPE: Primary | ICD-10-CM

## 2024-02-29 DIAGNOSIS — D50.9 IRON DEFICIENCY ANEMIA, UNSPECIFIED IRON DEFICIENCY ANEMIA TYPE: ICD-10-CM

## 2024-02-29 DIAGNOSIS — N92.0 MENORRHAGIA WITH REGULAR CYCLE: ICD-10-CM

## 2024-02-29 LAB
ALBUMIN SERPL-MCNC: 3.9 G/DL (ref 3.5–5.2)
ALBUMIN/GLOB SERPL: 1.3 G/DL
ALP SERPL-CCNC: 62 U/L (ref 39–117)
ALT SERPL W P-5'-P-CCNC: 6 U/L (ref 1–33)
ANION GAP SERPL CALCULATED.3IONS-SCNC: 8 MMOL/L (ref 5–15)
AST SERPL-CCNC: 11 U/L (ref 1–32)
BASOPHILS # BLD AUTO: 0.05 10*3/MM3 (ref 0–0.2)
BASOPHILS NFR BLD AUTO: 1.2 % (ref 0–1.5)
BILIRUB SERPL-MCNC: 0.4 MG/DL (ref 0–1.2)
BUN SERPL-MCNC: 8 MG/DL (ref 6–20)
BUN/CREAT SERPL: 11.6 (ref 7–25)
CALCIUM SPEC-SCNC: 8.4 MG/DL (ref 8.6–10.5)
CHLORIDE SERPL-SCNC: 102 MMOL/L (ref 98–107)
CO2 SERPL-SCNC: 27 MMOL/L (ref 22–29)
CREAT SERPL-MCNC: 0.69 MG/DL (ref 0.57–1)
DEPRECATED RDW RBC AUTO: 37.2 FL (ref 37–54)
EGFRCR SERPLBLD CKD-EPI 2021: 122.9 ML/MIN/1.73
EOSINOPHIL # BLD AUTO: 0.14 10*3/MM3 (ref 0–0.4)
EOSINOPHIL NFR BLD AUTO: 3.3 % (ref 0.3–6.2)
ERYTHROCYTE [DISTWIDTH] IN BLOOD BY AUTOMATED COUNT: 12.5 % (ref 12.3–15.4)
FERRITIN SERPL-MCNC: 38.83 NG/ML (ref 13–150)
GLOBULIN UR ELPH-MCNC: 3.1 GM/DL
GLUCOSE SERPL-MCNC: 84 MG/DL (ref 65–99)
HCT VFR BLD AUTO: 41.5 % (ref 34–46.6)
HGB BLD-MCNC: 13.1 G/DL (ref 12–15.9)
IMM GRANULOCYTES # BLD AUTO: 0.01 10*3/MM3 (ref 0–0.05)
IMM GRANULOCYTES NFR BLD AUTO: 0.2 % (ref 0–0.5)
IRON 24H UR-MRATE: 41 MCG/DL (ref 37–145)
IRON SATN MFR SERPL: 10 % (ref 20–50)
LYMPHOCYTES # BLD AUTO: 2.04 10*3/MM3 (ref 0.7–3.1)
LYMPHOCYTES NFR BLD AUTO: 48.6 % (ref 19.6–45.3)
MCH RBC QN AUTO: 25.7 PG (ref 26.6–33)
MCHC RBC AUTO-ENTMCNC: 31.6 G/DL (ref 31.5–35.7)
MCV RBC AUTO: 81.5 FL (ref 79–97)
MONOCYTES # BLD AUTO: 0.33 10*3/MM3 (ref 0.1–0.9)
MONOCYTES NFR BLD AUTO: 7.9 % (ref 5–12)
NEUTROPHILS NFR BLD AUTO: 1.63 10*3/MM3 (ref 1.7–7)
NEUTROPHILS NFR BLD AUTO: 38.8 % (ref 42.7–76)
NRBC BLD AUTO-RTO: 0 /100 WBC (ref 0–0.2)
PLATELET # BLD AUTO: 308 10*3/MM3 (ref 140–450)
PMV BLD AUTO: 8.8 FL (ref 6–12)
POTASSIUM SERPL-SCNC: 4.3 MMOL/L (ref 3.5–5.2)
PROT SERPL-MCNC: 7 G/DL (ref 6–8.5)
RBC # BLD AUTO: 5.09 10*6/MM3 (ref 3.77–5.28)
SODIUM SERPL-SCNC: 137 MMOL/L (ref 136–145)
TIBC SERPL-MCNC: 396 MCG/DL (ref 298–536)
TRANSFERRIN SERPL-MCNC: 266 MG/DL (ref 200–360)
WBC NRBC COR # BLD AUTO: 4.2 10*3/MM3 (ref 3.4–10.8)

## 2024-02-29 PROCEDURE — 96365 THER/PROPH/DIAG IV INF INIT: CPT

## 2024-02-29 PROCEDURE — 25010000002 IRON SUCROSE PER 1 MG: Performed by: NURSE PRACTITIONER

## 2024-02-29 PROCEDURE — 63710000001 DIPHENHYDRAMINE PER 50 MG: Performed by: NURSE PRACTITIONER

## 2024-02-29 PROCEDURE — 82728 ASSAY OF FERRITIN: CPT

## 2024-02-29 PROCEDURE — 84466 ASSAY OF TRANSFERRIN: CPT

## 2024-02-29 PROCEDURE — 80053 COMPREHEN METABOLIC PANEL: CPT

## 2024-02-29 PROCEDURE — 96374 THER/PROPH/DIAG INJ IV PUSH: CPT

## 2024-02-29 PROCEDURE — 85025 COMPLETE CBC W/AUTO DIFF WBC: CPT

## 2024-02-29 PROCEDURE — 36415 COLL VENOUS BLD VENIPUNCTURE: CPT

## 2024-02-29 PROCEDURE — 83540 ASSAY OF IRON: CPT

## 2024-02-29 PROCEDURE — 25810000003 SODIUM CHLORIDE 0.9 % SOLUTION: Performed by: NURSE PRACTITIONER

## 2024-02-29 PROCEDURE — 93005 ELECTROCARDIOGRAM TRACING: CPT

## 2024-02-29 PROCEDURE — 93010 ELECTROCARDIOGRAM REPORT: CPT | Performed by: INTERNAL MEDICINE

## 2024-02-29 RX ORDER — DIPHENHYDRAMINE HYDROCHLORIDE 50 MG/ML
50 INJECTION INTRAMUSCULAR; INTRAVENOUS AS NEEDED
Status: DISCONTINUED | OUTPATIENT
Start: 2024-02-29 | End: 2024-02-29 | Stop reason: HOSPADM

## 2024-02-29 RX ORDER — ACETAMINOPHEN 325 MG/1
650 TABLET ORAL ONCE
Status: COMPLETED | OUTPATIENT
Start: 2024-02-29 | End: 2024-02-29

## 2024-02-29 RX ORDER — FAMOTIDINE 10 MG/ML
20 INJECTION, SOLUTION INTRAVENOUS AS NEEDED
Status: DISCONTINUED | OUTPATIENT
Start: 2024-02-29 | End: 2024-02-29 | Stop reason: HOSPADM

## 2024-02-29 RX ORDER — DIPHENHYDRAMINE HCL 25 MG
25 CAPSULE ORAL ONCE
Status: COMPLETED | OUTPATIENT
Start: 2024-02-29 | End: 2024-02-29

## 2024-02-29 RX ORDER — SODIUM CHLORIDE 9 MG/ML
20 INJECTION, SOLUTION INTRAVENOUS ONCE
Status: COMPLETED | OUTPATIENT
Start: 2024-02-29 | End: 2024-02-29

## 2024-02-29 RX ADMIN — IRON SUCROSE 200 MG: 20 INJECTION, SOLUTION INTRAVENOUS at 10:54

## 2024-02-29 RX ADMIN — ACETAMINOPHEN 650 MG: 325 TABLET, FILM COATED ORAL at 10:46

## 2024-02-29 RX ADMIN — SODIUM CHLORIDE 20 ML/HR: 9 INJECTION, SOLUTION INTRAVENOUS at 10:40

## 2024-02-29 RX ADMIN — DIPHENHYDRAMINE HYDROCHLORIDE 25 MG: 25 CAPSULE ORAL at 10:46

## 2024-02-29 NOTE — PROGRESS NOTES
[facility] HEMATOLOGY & ONCOLOGY [place]  [address]  [place] [id number] [phone]    Patient Name: Tim Saenz  Encounter Date: 02/29/2024   YOB: 1995  Patient Number: [id number]    PROGRESS NOTE    HISTORY OF PRESENT ILLNESS: Tim Saenz is a 28 y.o. female followed by this office for iron deficiency anemia. History is obtained from patient. History is considered to be accurate.    She has health history significant for DM and iron deficiency, depression/anxiety. She also reports frequent menstruation r/t fibroids which is managed by GYN.  She is taking oral iron once daily.     INTERVAL HISTORY     Pt presents to clinic for continued follow up.     Scheduled for myomectomy March 11, 2024.    She had Venofer 200 mg on Feb 16, 2024 and will have second infusion today.  She reports some muscle pain after her first infusion.     She had labs drawn today and results were reviewed with her in office.      LABS    Lab Results - Last 18 Months   Lab Units 02/29/24  0914 01/11/24  0926 11/30/23  0904 08/31/23  0916 05/31/23  0849 04/19/23  0840   HEMOGLOBIN g/dL 13.1 12.8 12.9 13.1 12.7 12.3   HEMATOCRIT % 41.5 41.6 42.6 43.8 42.0 42.9   MCV fL 81.5 82.7 83.9 82.5 76.9* 74.4*   WBC 10*3/mm3 4.20 3.40 3.44 3.87 3.12* 4.41   RDW % 12.5 13.0 12.6 14.0 17.0* 22.8*   MPV fL 8.8 8.9 8.8 9.1 9.4 9.2   PLATELETS 10*3/mm3 308 261 284 317 348 407   IMM GRAN % % 0.2  --   --  0.0 0.0  --    NEUTROS ABS 10*3/mm3 1.63* 1.70 1.51* 1.58* 1.15* 1.62*   LYMPHS ABS 10*3/mm3 2.04  --   --  1.83 1.61  --    MONOS ABS 10*3/mm3 0.33  --   --  0.30 0.23  --    EOS ABS 10*3/mm3 0.14 0.07 0.07 0.11 0.08 0.04   BASOS ABS 10*3/mm3 0.05  --  0.07 0.05 0.05  --    IMMATURE GRANS (ABS) 10*3/mm3 0.01  --   --  0.00 0.00  --    NRBC /100 WBC 0.0  --   --  0.0 0.0  --    NEUTROPHIL % %  --  49.0 43.9  --   --  36.7*   MONOCYTES % %  --  7.0  --   --   --  5.1  "  BASOPHIL % %  --   --  2.0*  --   --   --    ATYP LYMPH % %  --  7.0* 9.2*  --   --  4.1   ANISOCYTOSIS   --   --   --   --   --  Slight/1+   GIANT PLT   --  Slight/1+  --   --   --  Slight/1+       Lab Results - Last 18 Months   Lab Units 02/29/24  0914 01/11/24  0926 11/30/23  0904 08/31/23  0916 05/31/23  0849 04/19/23  0840   GLUCOSE mg/dL 84 86 93 75 87 83   SODIUM mmol/L 137 138 138 138 139 137   POTASSIUM mmol/L 4.3 3.8 3.6 4.2 3.8 3.8   CO2 mmol/L 27.0 26.0 29.0 26.0 25.0 24.0   CHLORIDE mmol/L 102 105 102 103 106 103   ANION GAP mmol/L 8.0 7.0 7.0 9.0 8.0 10.0   CREATININE mg/dL 0.69 0.64 0.62 0.65 0.61 0.58   BUN mg/dL 8 6 8 8 5* 6   BUN / CREAT RATIO  11.6 9.4 12.9 12.3 8.2 10.3   CALCIUM mg/dL 8.4* 8.4* 8.5* 8.4* 8.8 9.1   ALK PHOS U/L 62 66 63 69 66 65   TOTAL PROTEIN g/dL 7.0 7.2 7.0 7.3 7.4 7.5   ALT (SGPT) U/L 6 6 5 6 6 7   AST (SGOT) U/L 11 11 10 12 11 13   BILIRUBIN mg/dL 0.4 0.3 0.9 0.7 0.6 0.6   ALBUMIN g/dL 3.9 3.9 3.9 4.3 4.1 4.2   GLOBULIN gm/dL 3.1 3.3 3.1 3.0 3.3 3.3       No results for input(s): \"MSPIKE\", \"KAPPALAMB\", \"IGLFLC\", \"URICACID\", \"FREEKAPPAL\", \"CEA\", \"LDH\", \"REFLABREPO\" in the last 33780 hours.    Lab Results - Last 18 Months   Lab Units 02/29/24  0914 01/11/24  0926 11/30/23  0904 08/31/23  0916 05/31/23  0849 04/19/23  0840   IRON mcg/dL 41 35* 63 234* 214* 121   TIBC mcg/dL 396 349 353 389 446 465   IRON SATURATION (TSAT) % 10* 10* 18* 60* 48 26   FERRITIN ng/mL 38.83 41.01 48.08 29.06 9.64* 24.09         PAST MEDICAL HISTORY:  ALLERGIES:  Allergies   Allergen Reactions    Latex Itching    Naproxen GI Intolerance     CURRENT MEDICATIONS:  Outpatient Encounter Medications as of 2/29/2024   Medication Sig Dispense Refill    busPIRone (BUSPAR) 5 MG tablet       docusate sodium 100 MG capsule docusate sodium 100 mg capsule   Take 2 capsules every day by oral route for 30 days.      FREESTYLE LITE test strip       Lancets (freestyle) lancets       Mounjaro 5 MG/0.5ML solution " "pen-injector       ondansetron (ZOFRAN) 8 MG tablet       Trintellix 5 MG tablet tablet        No facility-administered encounter medications on file as of 2/29/2024.     ADULT ILLNESSES:  Patient Active Problem List   Diagnosis Code    Cervical lymphadenopathy R59.0    Excessive and frequent menstruation N92.0    Iron deficiency anemia D50.9    Menorrhagia with regular cycle N92.0       HEALTH MAINTENANCE ITEMS:  Health Maintenance Due   Topic Date Due    Hepatitis B (1 of 3 - 3-dose series) Never done    URINE MICROALBUMIN  Never done    BMI FOLLOWUP  Never done    Pneumococcal Vaccine 0-64 (1 of 2 - PCV) Never done    HPV VACCINES (1 - 2-dose series) Never done    TDAP/TD VACCINES (1 - Tdap) Never done    HEPATITIS C SCREENING  Never done    ANNUAL PHYSICAL  Never done    DIABETIC FOOT EXAM  Never done    PAP SMEAR  Never done    HEMOGLOBIN A1C  Never done    DIABETIC EYE EXAM  Never done    INFLUENZA VACCINE  08/01/2023    COVID-19 Vaccine (3 - 2023-24 season) 09/01/2023       <no information>  Last Completed Colonoscopy       This patient has no relevant Health Maintenance data.          Immunization History   Administered Date(s) Administered    COVID-19 (MODERNA) 1st,2nd,3rd Dose Monovalent 07/02/2021, 07/30/2021     Last Completed Mammogram       This patient has no relevant Health Maintenance data.              FAMILY HISTORY:  Family History   Problem Relation Age of Onset    Uterine cancer Mother 29    Breast cancer Paternal Grandmother     Breast cancer Maternal Aunt      SOCIAL HISTORY:  Social History     Socioeconomic History    Marital status: Single   Tobacco Use    Smoking status: Never    Smokeless tobacco: Never   Vaping Use    Vaping Use: Never used   Substance and Sexual Activity    Alcohol use: Not Currently    Drug use: Never    Sexual activity: Never     Birth control/protection: None       REVIEW OF SYSTEMS:  Review of Systems   Constitutional:  Negative for fatigue and fever.        \"I " "feel alright\"   HENT:  Negative for trouble swallowing.    Respiratory:  Negative for cough and shortness of breath.    Cardiovascular:  Negative for chest pain, palpitations and leg swelling.   Gastrointestinal:  Negative for nausea and vomiting.   Endocrine: Positive for polyuria.   Genitourinary:  Positive for menstrual problem (Menorrhagia. ). Negative for hematuria.        Fibroids   Musculoskeletal:  Negative for arthralgias and myalgias.   Skin:  Negative for rash, skin lesions and wound.   Neurological:  Negative for dizziness, syncope, memory problem and confusion.   Psychiatric/Behavioral:  Positive for depressed mood. Negative for suicidal ideas. The patient is nervous/anxious.        /84   Pulse 98   Temp 97.6 °F (36.4 °C)   Resp 16   Ht 167.6 cm (66\")   Wt 101 kg (222 lb 11.2 oz)   LMP 02/06/2024 (Exact Date)   SpO2 96%   BMI 35.94 kg/m²  Body surface area is 2.09 meters squared.    Pain Score    02/29/24 0922   PainSc: 0-No pain           Physical Exam  Constitutional:       Appearance: Normal appearance.   HENT:      Head: Normocephalic and atraumatic.   Cardiovascular:      Rate and Rhythm: Normal rate and regular rhythm.   Pulmonary:      Effort: Pulmonary effort is normal.      Breath sounds: Normal breath sounds.   Abdominal:      General: Bowel sounds are normal.      Palpations: Abdomen is soft.   Musculoskeletal:      Right lower leg: No edema.      Left lower leg: No edema.   Skin:     General: Skin is warm and dry.   Neurological:      Mental Status: She is alert and oriented to person, place, and time.   Psychiatric:         Attention and Perception: Attention normal.         Mood and Affect: Mood normal.         Judgment: Judgment normal.         Tim Saenz reports a pain score of 0.  Given her pain assessment as noted, treatment options were discussed and the following options were decided upon as a follow-up plan to address the patient's pain:  No intervention indicated. " .      ASSESSMENT / PLAN:    1. Iron deficiency anemia, unspecified iron deficiency anemia type    2. Menorrhagia with irregular cycle           1.  Iron Deficiency   -Pt is taking oral iron once daily  -Received Venofer Feb 16, 2024 and Is scheduled for second infusion today.    -She complained of some muscle pain with the first infusion.    -Labs today:  Hgb 13.1, Hct 41.5, Iron 41, Ferritin 38, Sat 10%, TIBC 396  -Pt is getting second infusion today.     2.  Menorrhagia  -Contributing to iron deficiency  -Scheduled March 11, 2024 for laparoscopic myomectomy in [place] Dr. Gilbert Marino        PLAN:  Pt will get second infusion today  Continue current medications, treatment plans and follow up with PCP and any other providers  Labs only in 6 weeks at [facility].  Orders given to patient  Return to office 3 months  Pre-office labs for CBC, CMP, Iron Profile, Ferritin   Orders have been signed  Care discussed with patient.  Understanding expressed.  Patient agreeable with plan.          Cecy Jin, JEFF  02/29/2024

## 2024-02-29 NOTE — DISCHARGE INSTRUCTIONS
Before you come to the hospital        Arrival time: AS DIRECTED BY OFFICE     YOU MAY TAKE THE FOLLOWING MEDICATION(S) THE MORNING OF SURGERY WITH A SIP OF WATER:   Buspar,   Hold your mounjaro for 1 week prior to surgery           ALL OTHER HOME MEDICATION CHECK WITH YOUR PHYSICIAN (especially if   you are taking diabetes medicines or blood thinners)    If you were given and instructed to use a germ- killing soap, use as directed the night before surgery and again the morning of surgery or as directed by your surgeon. (Use one-half of the bottle with each shower.)   See attached information for How to Use Chlorhexidine for Bathing if applicable.            Eating and drinking restrictions prior to scheduled arrival time    2 Hours before arrival time STOP   Drinking Clear liquids (water, black coffee-NO CREAM,  apple juice-no pulp)    Clear Liquids    Water and flavored water                                                                      Clear Fruit juices, such as cranberry juice and apple juice.  Black coffee (NO cream of any kind, including powdered).  Plain tea  Clear bouillon or broth.  Flavored gelatin.  Soda.  Gatorade or Powerade.    8 Hours before arrival time STOP   All food (includes candy, gum and mints), full liquids, and dairy products  Full liquid examples  Juices that have pulp.  Frozen ice pops that contain fruit pieces.  Coffee with creamer  Milk.  Yogurt.    (It is extremely important that you follow these guidelines to prevent delay or cancelation of your procedure)                       MANAGING PAIN AFTER SURGERY    We know you are probably wondering what your pain will be like after surgery.  Following surgery it is unrealistic to expect you will not have pain.   Pain is how our bodies let us know that something is wrong or cautions us to be careful.  That said, our goal is to make your pain tolerable.    Methods we may use to treat your pain include (oral or IV medications, PCAs,  epidurals, nerve blocks, etc.)   While some procedures require IV pain medications for a short time after surgery, transitioning to pain medications by mouth allows for better management of pain.   Your nurse will encourage you to take oral pain medications whenever possible.  IV medications work almost immediately, but only last a short while.  Taking medications by mouth allows for a more constant level of medication in your blood stream for a longer period of time.      Once your pain is out of control it is harder to get back under control.  It is important you are aware when your next dose of pain medication is due.  If you are admitted, your nurse may write the time of your next dose on the white board in your room to help you remember.      We are interested in your pain and encourage you to inform us about aggravating factors during your visit.   Many times a simple repositioning every few hours can make a big difference.    If your physician says it is okay, do not let your pain prevent you from getting out of bed. Be sure to call your nurse for assistance prior to getting up so you do not fall.      Before surgery, please decide your tolerable pain goal.  These faces help describe the pain ratings we use on a 0-10 scale.   Be prepared to tell us your goal and whether or not you take pain or anxiety medications at home.          Preparing for Surgery  Preparing for surgery is an important part of your care. It can make things go more smoothly and help you avoid complications. The steps leading up to surgery may vary among hospitals. Follow all instructions given to you by your health care providers. Ask questions if you do not understand something. Talk about any concerns that you have.  Here are some questions to consider asking before your surgery:  If my surgery is not an emergency (is elective), when would be the best time to have the surgery?  What arrangements do I need to make for work, home, or  school?  What will my recovery be like? How long will it be before I can return to normal activities?  Will I need to prepare my home? Will I need to arrange care for me or my children?  Should I expect to have pain after surgery? What are my pain management options? Are there nonmedical options that I can try for pain?  Tell a health care provider about:  Any allergies you have.  All medicines you are taking, including vitamins, herbs, eye drops, creams, and over-the-counter medicines.  Any problems you or family members have had with anesthetic medicines.  Any blood disorders you have.  Any surgeries you have had.  Any medical conditions you have.  Whether you are pregnant or may be pregnant.  What are the risks?  The risks and complications of surgery depend on the specific procedure that you have. Discuss all the risks with your health care providers before your surgery. Ask about common surgical complications, which may include:  Infection.  Bleeding or a need for blood replacement (transfusion).  Allergic reactions to medicines.  Damage to surrounding nerves, tissues, or structures.  A blood clot.  Scarring.  Failure of the surgery to correct the problem.  Follow these instructions before the procedure:  Several days or weeks before your procedure  You may have a physical exam by your primary health care provider to make sure it is safe for you to have surgery.  You may have testing. This may include a chest X-ray, blood and urine tests, electrocardiogram (ECG), or other testing.  Ask your health care provider about:  Changing or stopping your regular medicines. This is especially important if you are taking diabetes medicines or blood thinners.  Taking medicines such as aspirin and ibuprofen. These medicines can thin your blood. Do not take these medicines unless your health care provider tells you to take them.  Taking over-the-counter medicines, vitamins, herbs, and supplements.  Do not use any products  that contain nicotine or tobacco, such as cigarettes and e-cigarettes. If you need help quitting, ask your health care provider.  Avoid alcohol.  Ask your health care provider if there are exercises you can do to prepare for surgery.  Eat a healthy diet.   Plan to have someone 18 years of age or older to take you home from the hospital. We will need to verify your ride on the morning of surgery if you are being discharged home on the same day. Tell your ride to be expecting a call from the hospital prior to your procedure.   Plan to have a responsible adult care for you for at least 24 hours after you leave the hospital or clinic. This is important.  The day before your procedure  You may be given antibiotic medicine to take by mouth to help prevent infection. Take it as told by your health care provider.  You may be asked to shower with a germ-killing soap.  Follow instructions from your health care provider about eating and drinking restrictions.   Pack comfortable clothes according to your procedure.   The day of your procedure  You may need to take another shower with a germ-killing soap before you leave home in the morning.  With a small sip of water, take only the medicines that you are told to take.  Remove all jewelry including rings.   Leave anything you consider valuable at home except hearing aids if needed.  You do not need to bring your home medications into the hospital.   Do not wear any makeup, nail polish, powder, deodorant, lotion, hair accessories, or anything on your skin or body except your clothes.  If you will be staying in the hospital, bring a case to hold your glasses, contacts, or dentures. You may also want to bring your robe and non-skid footwear.       (Do not use denture adhesives since you will be asked to remove them during  surgery).   If you wear oxygen at home, bring it with you the day of surgery.  If instructed by your health care provider, bring your sleep apnea device with you  on the day of your surgery (if this applies to you).  You may want to leave your suitcase and sleep apnea device in the car until after surgery.   Arrive at the hospital as scheduled.  Bring a friend or family member with you who can help to answer questions and be present while you meet with your health care provider.  At the hospital  When you arrive at the hospital:  Go to registration located at the main entrance of the hospital. You will be registered and given a beeper and a sticker sheet. Take the stickers to the Outpatient nurses desk and place in the black tray. This is to notify staff that you have arrived. Then return to the lobby to wait.   When your beeper lights up and vibrates proceed through the double doors, under the stairs, and a member of the Outpatient Surgery staff will escort you to your preoperative room.  You may have to wear compression sleeves. These help to prevent blood clots and reduce swelling in your legs.  An IV may be inserted into one of your veins.              In the operating room, you may be given one or more of the following:        A medicine to help you relax (sedative).        A medicine to numb the area (local anesthetic).        A medicine to make you fall asleep (general anesthetic).        A medicine that is injected into an area of your body to numb everything below the                      injection site (regional anesthetic).  You may be given an antibiotic through your IV to help prevent infection.  Your surgical site will be marked or identified.    Contact a health care provider if you:  Develop a fever of more than 100.4°F (38°C) or other feelings of illness during the 48 hours before your surgery.  Have symptoms that get worse.  Have questions or concerns about your surgery.  Summary  Preparing for surgery can make the procedure go more smoothly and lower your risk of complications.  Before surgery, make a list of questions and concerns to discuss with your  surgeon. Ask about the risks and possible complications.  In the days or weeks before your surgery, follow all instructions from your health care provider. You may need to stop smoking, avoid alcohol, follow eating restrictions, and change or stop your regular medicines.  Contact your surgeon if you develop a fever or other signs of illness during the few days before your surgery.  This information is not intended to replace advice given to you by your health care provider. Make sure you discuss any questions you have with your health care provider.  Document Revised: 12/21/2018 Document Reviewed: 10/23/2018  [email or web address] Patient Education © 2021 [email or web address]         How to Use Chlorhexidine Before Surgery  Chlorhexidine gluconate (CHG) is a germ-killing (antiseptic) solution that is used to clean the skin. It can get rid of the bacteria that normally live on the skin and can keep them away for about 24 hours. To clean your skin with CHG, you may be given:  A CHG solution to use in the shower or as part of a sponge bath.  A prepackaged cloth that contains CHG.  Cleaning your skin with CHG may help lower the risk for infection:  While you are staying in the intensive care unit of the hospital.  If you have a vascular access, such as a central line, to provide short-term or long-term access to your veins.  If you have a catheter to drain urine from your bladder.  If you are on a ventilator. A ventilator is a machine that helps you breathe by moving air in and out of your lungs.  After surgery.  What are the risks?  Risks of using CHG include:  A skin reaction.  Hearing loss, if CHG gets in your ears and you have a perforated eardrum.  Eye injury, if CHG gets in your eyes and is not rinsed out.  The CHG product catching fire.  Make sure that you avoid smoking and flames after applying CHG to your skin.  Do not use CHG:  If you have a chlorhexidine allergy or have previously reacted to chlorhexidine.  On babies younger  than 2 months of age.  How to use CHG solution  Use CHG only as told by your health care provider, and follow the instructions on the label.  Use the full amount of CHG as directed. Usually, this is one bottle.  During a shower    Follow these steps when using CHG solution during a shower (unless your health care provider gives you different instructions):  Start the shower.  Use your normal soap and shampoo to wash your face and hair.  Turn off the shower or move out of the shower stream.  Pour the CHG onto a clean washcloth. Do not use any type of brush or rough-edged sponge.  Starting at your neck, lather your body down to your toes. Make sure you follow these instructions:  If you will be having surgery, pay special attention to the part of your body where you will be having surgery. Scrub this area for at least 1 minute.  Do not use CHG on your head or face. If the solution gets into your ears or eyes, rinse them well with water.  Avoid your genital area.  Avoid any areas of skin that have broken skin, cuts, or scrapes.  Scrub your back and under your arms. Make sure to wash skin folds.  Let the lather sit on your skin for 1-2 minutes or as long as told by your health care provider.  Thoroughly rinse your entire body in the shower. Make sure that all body creases and crevices are rinsed well.  Dry off with a clean towel. Do not put any substances on your body afterward--such as powder, lotion, or perfume--unless you are told to do so by your health care provider. Only use lotions that are recommended by the .  Put on clean clothes or pajamas.  If it is the night before your surgery, sleep in clean sheets.     During a sponge bath  Follow these steps when using CHG solution during a sponge bath (unless your health care provider gives you different instructions):  Use your normal soap and shampoo to wash your face and hair.  Pour the CHG onto a clean washcloth.  Starting at your neck, lather your body  down to your toes. Make sure you follow these instructions:  If you will be having surgery, pay special attention to the part of your body where you will be having surgery. Scrub this area for at least 1 minute.  Do not use CHG on your head or face. If the solution gets into your ears or eyes, rinse them well with water.  Avoid your genital area.  Avoid any areas of skin that have broken skin, cuts, or scrapes.  Scrub your back and under your arms. Make sure to wash skin folds.  Let the lather sit on your skin for 1-2 minutes or as long as told by your health care provider.  Using a different clean, wet washcloth, thoroughly rinse your entire body. Make sure that all body creases and crevices are rinsed well.  Dry off with a clean towel. Do not put any substances on your body afterward--such as powder, lotion, or perfume--unless you are told to do so by your health care provider. Only use lotions that are recommended by the .  Put on clean clothes or pajamas.  If it is the night before your surgery, sleep in clean sheets.  How to use CHG prepackaged cloths  Only use CHG cloths as told by your health care provider, and follow the instructions on the label.  Use the CHG cloth on clean, dry skin.  Do not use the CHG cloth on your head or face unless your health care provider tells you to.  When washing with the CHG cloth:  Avoid your genital area.  Avoid any areas of skin that have broken skin, cuts, or scrapes.  Before surgery    Follow these steps when using a CHG cloth to clean before surgery (unless your health care provider gives you different instructions):  Using the CHG cloth, vigorously scrub the part of your body where you will be having surgery. Scrub using a back-and-forth motion for 3 minutes. The area on your body should be completely wet with CHG when you are done scrubbing.  Do not rinse. Discard the cloth and let the area air-dry. Do not put any substances on the area afterward, such as  powder, lotion, or perfume.  Put on clean clothes or pajamas.  If it is the night before your surgery, sleep in clean sheets.     For general bathing  Follow these steps when using CHG cloths for general bathing (unless your health care provider gives you different instructions).  Use a separate CHG cloth for each area of your body. Make sure you wash between any folds of skin and between your fingers and toes. Wash your body in the following order, switching to a new cloth after each step:  The front of your neck, shoulders, and chest.  Both of your arms, under your arms, and your hands.  Your stomach and groin area, avoiding the genitals.  Your right leg and foot.  Your left leg and foot.  The back of your neck, your back, and your buttocks.  Do not rinse. Discard the cloth and let the area air-dry. Do not put any substances on your body afterward--such as powder, lotion, or perfume--unless you are told to do so by your health care provider. Only use lotions that are recommended by the .  Put on clean clothes or pajamas.  Contact a health care provider if:  Your skin gets irritated after scrubbing.  You have questions about using your solution or cloth.  You swallow any chlorhexidine. Call your local poison control center (1-349.998.4569 in the [place]  Get help right away if:  Your eyes itch badly, or they become very red or swollen.  Your skin itches badly and is red or swollen.  Your hearing changes.  You have trouble seeing.  You have swelling or tingling in your mouth or throat.  You have trouble breathing.  These symptoms may represent a serious problem that is an emergency. Do not wait to see if the symptoms will go away. Get medical help right away. Call your local emergency services (961 in the [place] Do not drive yourself to the hospital.  Summary  Chlorhexidine gluconate (CHG) is a germ-killing (antiseptic) solution that is used to clean the skin. Cleaning your skin with CHG may help to lower  your risk for infection.  You may be given CHG to use for bathing. It may be in a bottle or in a prepackaged cloth to use on your skin. Carefully follow your health care provider's instructions and the instructions on the product label.  Do not use CHG if you have a chlorhexidine allergy.  Contact your health care provider if your skin gets irritated after scrubbing.  This information is not intended to replace advice given to you by your health care provider. Make sure you discuss any questions you have with your health care provider.  Document Revised: 04/17/2023 Document Reviewed: 02/28/2022  [employer] Patient Education © 2023 [employer]        How to Use Chlorhexidine Before Surgery  Chlorhexidine gluconate (CHG) is a germ-killing (antiseptic) solution that is used to clean the skin. It can get rid of the bacteria that normally live on the skin and can keep them away for about 24 hours. To clean your skin with CHG, you may be given:  A CHG solution to use in the shower or as part of a sponge bath.  A prepackaged cloth that contains CHG.  Cleaning your skin with CHG may help lower the risk for infection:  While you are staying in the intensive care unit of the hospital.  If you have a vascular access, such as a central line, to provide short-term or long-term access to your veins.  If you have a catheter to drain urine from your bladder.  If you are on a ventilator. A ventilator is a machine that helps you breathe by moving air in and out of your lungs.  After surgery.  What are the risks?  Risks of using CHG include:  A skin reaction.  Hearing loss, if CHG gets in your ears and you have a perforated eardrum.  Eye injury, if CHG gets in your eyes and is not rinsed out.  The CHG product catching fire.  Make sure that you avoid smoking and flames after applying CHG to your skin.  Do not use CHG:  If you have a chlorhexidine allergy or have previously reacted to chlorhexidine.  On babies younger than 2 months of  age.  How to use CHG solution  Use CHG only as told by your health care provider, and follow the instructions on the label.  Use the full amount of CHG as directed. Usually, this is one bottle.  During a shower    Follow these steps when using CHG solution during a shower (unless your health care provider gives you different instructions):  Start the shower.  Use your normal soap and shampoo to wash your face and hair.  Turn off the shower or move out of the shower stream.  Pour the CHG onto a clean washcloth. Do not use any type of brush or rough-edged sponge.  Starting at your neck, lather your body down to your toes. Make sure you follow these instructions:  If you will be having surgery, pay special attention to the part of your body where you will be having surgery. Scrub this area for at least 1 minute.  Do not use CHG on your head or face. If the solution gets into your ears or eyes, rinse them well with water.  Avoid your genital area.  Avoid any areas of skin that have broken skin, cuts, or scrapes.  Scrub your back and under your arms. Make sure to wash skin folds.  Let the lather sit on your skin for 1-2 minutes or as long as told by your health care provider.  Thoroughly rinse your entire body in the shower. Make sure that all body creases and crevices are rinsed well.  Dry off with a clean towel. Do not put any substances on your body afterward--such as powder, lotion, or perfume--unless you are told to do so by your health care provider. Only use lotions that are recommended by the .  Put on clean clothes or pajamas.  If it is the night before your surgery, sleep in clean sheets.     During a sponge bath  Follow these steps when using CHG solution during a sponge bath (unless your health care provider gives you different instructions):  Use your normal soap and shampoo to wash your face and hair.  Pour the CHG onto a clean washcloth.  Starting at your neck, lather your body down to your  toes. Make sure you follow these instructions:  If you will be having surgery, pay special attention to the part of your body where you will be having surgery. Scrub this area for at least 1 minute.  Do not use CHG on your head or face. If the solution gets into your ears or eyes, rinse them well with water.  Avoid your genital area.  Avoid any areas of skin that have broken skin, cuts, or scrapes.  Scrub your back and under your arms. Make sure to wash skin folds.  Let the lather sit on your skin for 1-2 minutes or as long as told by your health care provider.  Using a different clean, wet washcloth, thoroughly rinse your entire body. Make sure that all body creases and crevices are rinsed well.  Dry off with a clean towel. Do not put any substances on your body afterward--such as powder, lotion, or perfume--unless you are told to do so by your health care provider. Only use lotions that are recommended by the .  Put on clean clothes or pajamas.  If it is the night before your surgery, sleep in clean sheets.  How to use CHG prepackaged cloths  Only use CHG cloths as told by your health care provider, and follow the instructions on the label.  Use the CHG cloth on clean, dry skin.  Do not use the CHG cloth on your head or face unless your health care provider tells you to.  When washing with the CHG cloth:  Avoid your genital area.  Avoid any areas of skin that have broken skin, cuts, or scrapes.  Before surgery    Follow these steps when using a CHG cloth to clean before surgery (unless your health care provider gives you different instructions):  Using the CHG cloth, vigorously scrub the part of your body where you will be having surgery. Scrub using a back-and-forth motion for 3 minutes. The area on your body should be completely wet with CHG when you are done scrubbing.  Do not rinse. Discard the cloth and let the area air-dry. Do not put any substances on the area afterward, such as powder, lotion,  or perfume.  Put on clean clothes or pajamas.  If it is the night before your surgery, sleep in clean sheets.     For general bathing  Follow these steps when using CHG cloths for general bathing (unless your health care provider gives you different instructions).  Use a separate CHG cloth for each area of your body. Make sure you wash between any folds of skin and between your fingers and toes. Wash your body in the following order, switching to a new cloth after each step:  The front of your neck, shoulders, and chest.  Both of your arms, under your arms, and your hands.  Your stomach and groin area, avoiding the genitals.  Your right leg and foot.  Your left leg and foot.  The back of your neck, your back, and your buttocks.  Do not rinse. Discard the cloth and let the area air-dry. Do not put any substances on your body afterward--such as powder, lotion, or perfume--unless you are told to do so by your health care provider. Only use lotions that are recommended by the .  Put on clean clothes or pajamas.  Contact a health care provider if:  Your skin gets irritated after scrubbing.  You have questions about using your solution or cloth.  You swallow any chlorhexidine. Call your local poison control center (1-573.134.1726 in the [place]  Get help right away if:  Your eyes itch badly, or they become very red or swollen.  Your skin itches badly and is red or swollen.  Your hearing changes.  You have trouble seeing.  You have swelling or tingling in your mouth or throat.  You have trouble breathing.  These symptoms may represent a serious problem that is an emergency. Do not wait to see if the symptoms will go away. Get medical help right away. Call your local emergency services (995 in the [place] Do not drive yourself to the hospital.  Summary  Chlorhexidine gluconate (CHG) is a germ-killing (antiseptic) solution that is used to clean the skin. Cleaning your skin with CHG may help to lower your risk for  infection.  You may be given CHG to use for bathing. It may be in a bottle or in a prepackaged cloth to use on your skin. Carefully follow your health care provider's instructions and the instructions on the product label.  Do not use CHG if you have a chlorhexidine allergy.  Contact your health care provider if your skin gets irritated after scrubbing.  This information is not intended to replace advice given to you by your health care provider. Make sure you discuss any questions you have with your health care provider.  Document Revised: 04/17/2023 Document Reviewed: 02/28/2022  [email or web address] Patient Education © 2023 [employer]        How to Use Chlorhexidine Before Surgery  Chlorhexidine gluconate (CHG) is a germ-killing (antiseptic) solution that is used to clean the skin. It can get rid of the bacteria that normally live on the skin and can keep them away for about 24 hours. To clean your skin with CHG, you may be given:  A CHG solution to use in the shower or as part of a sponge bath.  A prepackaged cloth that contains CHG.  Cleaning your skin with CHG may help lower the risk for infection:  While you are staying in the intensive care unit of the hospital.  If you have a vascular access, such as a central line, to provide short-term or long-term access to your veins.  If you have a catheter to drain urine from your bladder.  If you are on a ventilator. A ventilator is a machine that helps you breathe by moving air in and out of your lungs.  After surgery.  What are the risks?  Risks of using CHG include:  A skin reaction.  Hearing loss, if CHG gets in your ears and you have a perforated eardrum.  Eye injury, if CHG gets in your eyes and is not rinsed out.  The CHG product catching fire.  Make sure that you avoid smoking and flames after applying CHG to your skin.  Do not use CHG:  If you have a chlorhexidine allergy or have previously reacted to chlorhexidine.  On babies younger than 2 months of age.  How to  use CHG solution  Use CHG only as told by your health care provider, and follow the instructions on the label.  Use the full amount of CHG as directed. Usually, this is one bottle.  During a shower    Follow these steps when using CHG solution during a shower (unless your health care provider gives you different instructions):  Start the shower.  Use your normal soap and shampoo to wash your face and hair.  Turn off the shower or move out of the shower stream.  Pour the CHG onto a clean washcloth. Do not use any type of brush or rough-edged sponge.  Starting at your neck, lather your body down to your toes. Make sure you follow these instructions:  If you will be having surgery, pay special attention to the part of your body where you will be having surgery. Scrub this area for at least 1 minute.  Do not use CHG on your head or face. If the solution gets into your ears or eyes, rinse them well with water.  Avoid your genital area.  Avoid any areas of skin that have broken skin, cuts, or scrapes.  Scrub your back and under your arms. Make sure to wash skin folds.  Let the lather sit on your skin for 1-2 minutes or as long as told by your health care provider.  Thoroughly rinse your entire body in the shower. Make sure that all body creases and crevices are rinsed well.  Dry off with a clean towel. Do not put any substances on your body afterward--such as powder, lotion, or perfume--unless you are told to do so by your health care provider. Only use lotions that are recommended by the .  Put on clean clothes or pajamas.  If it is the night before your surgery, sleep in clean sheets.     During a sponge bath  Follow these steps when using CHG solution during a sponge bath (unless your health care provider gives you different instructions):  Use your normal soap and shampoo to wash your face and hair.  Pour the CHG onto a clean washcloth.  Starting at your neck, lather your body down to your toes. Make sure  you follow these instructions:  If you will be having surgery, pay special attention to the part of your body where you will be having surgery. Scrub this area for at least 1 minute.  Do not use CHG on your head or face. If the solution gets into your ears or eyes, rinse them well with water.  Avoid your genital area.  Avoid any areas of skin that have broken skin, cuts, or scrapes.  Scrub your back and under your arms. Make sure to wash skin folds.  Let the lather sit on your skin for 1-2 minutes or as long as told by your health care provider.  Using a different clean, wet washcloth, thoroughly rinse your entire body. Make sure that all body creases and crevices are rinsed well.  Dry off with a clean towel. Do not put any substances on your body afterward--such as powder, lotion, or perfume--unless you are told to do so by your health care provider. Only use lotions that are recommended by the .  Put on clean clothes or pajamas.  If it is the night before your surgery, sleep in clean sheets.  How to use CHG prepackaged cloths  Only use CHG cloths as told by your health care provider, and follow the instructions on the label.  Use the CHG cloth on clean, dry skin.  Do not use the CHG cloth on your head or face unless your health care provider tells you to.  When washing with the CHG cloth:  Avoid your genital area.  Avoid any areas of skin that have broken skin, cuts, or scrapes.  Before surgery    Follow these steps when using a CHG cloth to clean before surgery (unless your health care provider gives you different instructions):  Using the CHG cloth, vigorously scrub the part of your body where you will be having surgery. Scrub using a back-and-forth motion for 3 minutes. The area on your body should be completely wet with CHG when you are done scrubbing.  Do not rinse. Discard the cloth and let the area air-dry. Do not put any substances on the area afterward, such as powder, lotion, or perfume.  Put  on clean clothes or pajamas.  If it is the night before your surgery, sleep in clean sheets.     For general bathing  Follow these steps when using CHG cloths for general bathing (unless your health care provider gives you different instructions).  Use a separate CHG cloth for each area of your body. Make sure you wash between any folds of skin and between your fingers and toes. Wash your body in the following order, switching to a new cloth after each step:  The front of your neck, shoulders, and chest.  Both of your arms, under your arms, and your hands.  Your stomach and groin area, avoiding the genitals.  Your right leg and foot.  Your left leg and foot.  The back of your neck, your back, and your buttocks.  Do not rinse. Discard the cloth and let the area air-dry. Do not put any substances on your body afterward--such as powder, lotion, or perfume--unless you are told to do so by your health care provider. Only use lotions that are recommended by the .  Put on clean clothes or pajamas.  Contact a health care provider if:  Your skin gets irritated after scrubbing.  You have questions about using your solution or cloth.  You swallow any chlorhexidine. Call your local poison control center (1-964.834.3912 in the [place]  Get help right away if:  Your eyes itch badly, or they become very red or swollen.  Your skin itches badly and is red or swollen.  Your hearing changes.  You have trouble seeing.  You have swelling or tingling in your mouth or throat.  You have trouble breathing.  These symptoms may represent a serious problem that is an emergency. Do not wait to see if the symptoms will go away. Get medical help right away. Call your local emergency services (407 in the [place] Do not drive yourself to the hospital.  Summary  Chlorhexidine gluconate (CHG) is a germ-killing (antiseptic) solution that is used to clean the skin. Cleaning your skin with CHG may help to lower your risk for infection.  You  may be given CHG to use for bathing. It may be in a bottle or in a prepackaged cloth to use on your skin. Carefully follow your health care provider's instructions and the instructions on the product label.  Do not use CHG if you have a chlorhexidine allergy.  Contact your health care provider if your skin gets irritated after scrubbing.  This information is not intended to replace advice given to you by your health care provider. Make sure you discuss any questions you have with your health care provider.  Document Revised: 04/17/2023 Document Reviewed: 02/28/2022  Elsevier Patient Education © 2023 [employer].

## 2024-03-01 LAB
QT INTERVAL: 380 MS
QTC INTERVAL: 443 MS

## 2024-03-11 ENCOUNTER — HOSPITAL ENCOUNTER (OUTPATIENT)
Facility: HOSPITAL | Age: 29
Setting detail: HOSPITAL OUTPATIENT SURGERY
Discharge: HOME | End: 2024-03-11
Attending: OBSTETRICS & GYNECOLOGY | Admitting: OBSTETRICS & GYNECOLOGY
Payer: COMMERCIAL

## 2024-03-11 ENCOUNTER — ANESTHESIA (OUTPATIENT)
Dept: PERIOP | Facility: HOSPITAL | Age: 29
End: 2024-03-11
Payer: COMMERCIAL

## 2024-03-11 ENCOUNTER — ANESTHESIA EVENT (OUTPATIENT)
Dept: PERIOP | Facility: HOSPITAL | Age: 29
End: 2024-03-11
Payer: COMMERCIAL

## 2024-03-11 VITALS
SYSTOLIC BLOOD PRESSURE: 126 MMHG | RESPIRATION RATE: 16 BRPM | DIASTOLIC BLOOD PRESSURE: 72 MMHG | TEMPERATURE: 97.3 F | HEART RATE: 107 BPM | OXYGEN SATURATION: 100 %

## 2024-03-11 DIAGNOSIS — N92.0 MENORRHAGIA WITH REGULAR CYCLE: ICD-10-CM

## 2024-03-11 DIAGNOSIS — Z98.890 S/P MYOMECTOMY: Primary | ICD-10-CM

## 2024-03-11 LAB
ABO GROUP BLD: NORMAL
B-HCG UR QL: NEGATIVE
BLD GP AB SCN SERPL QL: NEGATIVE
GLUCOSE BLDC GLUCOMTR-MCNC: 107 MG/DL (ref 70–130)
GLUCOSE BLDC GLUCOMTR-MCNC: 90 MG/DL (ref 70–130)
RH BLD: POSITIVE
T&S EXPIRATION DATE: NORMAL

## 2024-03-11 PROCEDURE — 25010000002 PROPOFOL 10 MG/ML EMULSION

## 2024-03-11 PROCEDURE — 86901 BLOOD TYPING SEROLOGIC RH(D): CPT | Performed by: OBSTETRICS & GYNECOLOGY

## 2024-03-11 PROCEDURE — 25010000002 MIDAZOLAM PER 1 MG: Performed by: ANESTHESIOLOGY

## 2024-03-11 PROCEDURE — 81025 URINE PREGNANCY TEST: CPT | Performed by: OBSTETRICS & GYNECOLOGY

## 2024-03-11 PROCEDURE — 58545 LAPAROSCOPIC MYOMECTOMY: CPT | Performed by: OBSTETRICS & GYNECOLOGY

## 2024-03-11 PROCEDURE — 82948 REAGENT STRIP/BLOOD GLUCOSE: CPT

## 2024-03-11 PROCEDURE — 25010000002 DEXAMETHASONE PER 1 MG

## 2024-03-11 PROCEDURE — S2900 ROBOTIC SURGICAL SYSTEM: HCPCS | Performed by: OBSTETRICS & GYNECOLOGY

## 2024-03-11 PROCEDURE — 25810000003 LACTATED RINGERS PER 1000 ML: Performed by: ANESTHESIOLOGY

## 2024-03-11 PROCEDURE — 86900 BLOOD TYPING SEROLOGIC ABO: CPT | Performed by: OBSTETRICS & GYNECOLOGY

## 2024-03-11 PROCEDURE — 25010000002 DROPERIDOL PER 5 MG: Performed by: ANESTHESIOLOGY

## 2024-03-11 PROCEDURE — 88305 TISSUE EXAM BY PATHOLOGIST: CPT | Performed by: OBSTETRICS & GYNECOLOGY

## 2024-03-11 PROCEDURE — 25010000002 ONDANSETRON PER 1 MG: Performed by: NURSE ANESTHETIST, CERTIFIED REGISTERED

## 2024-03-11 PROCEDURE — 25010000002 GLYCOPYRROLATE 0.4 MG/2ML SOLUTION: Performed by: NURSE ANESTHETIST, CERTIFIED REGISTERED

## 2024-03-11 PROCEDURE — 25010000002 CEFAZOLIN PER 500 MG: Performed by: OBSTETRICS & GYNECOLOGY

## 2024-03-11 PROCEDURE — 25010000002 FENTANYL CITRATE (PF) 100 MCG/2ML SOLUTION

## 2024-03-11 PROCEDURE — 25010000002 ONDANSETRON PER 1 MG: Performed by: ANESTHESIOLOGY

## 2024-03-11 PROCEDURE — S0260 H&P FOR SURGERY: HCPCS | Performed by: OBSTETRICS & GYNECOLOGY

## 2024-03-11 PROCEDURE — 25010000002 FENTANYL CITRATE (PF) 50 MCG/ML SOLUTION: Performed by: ANESTHESIOLOGY

## 2024-03-11 PROCEDURE — 86850 RBC ANTIBODY SCREEN: CPT | Performed by: OBSTETRICS & GYNECOLOGY

## 2024-03-11 PROCEDURE — 25810000003 LACTATED RINGERS PER 1000 ML: Performed by: OBSTETRICS & GYNECOLOGY

## 2024-03-11 DEVICE — HEMOST ABS SURGICEL PWDR 3GM: Type: IMPLANTABLE DEVICE | Site: UTERUS | Status: FUNCTIONAL

## 2024-03-11 DEVICE — DEV WND/CLS CONTRL TISS STRATAFIX SPIRAL PDO 2/0 SH 14X14: Type: IMPLANTABLE DEVICE | Site: UTERUS | Status: FUNCTIONAL

## 2024-03-11 RX ORDER — SODIUM CHLORIDE 9 MG/ML
40 INJECTION, SOLUTION INTRAVENOUS AS NEEDED
Status: DISCONTINUED | OUTPATIENT
Start: 2024-03-11 | End: 2024-03-11 | Stop reason: HOSPADM

## 2024-03-11 RX ORDER — IBUPROFEN 600 MG/1
600 TABLET ORAL EVERY 6 HOURS PRN
Status: DISCONTINUED | OUTPATIENT
Start: 2024-03-11 | End: 2024-03-11 | Stop reason: HOSPADM

## 2024-03-11 RX ORDER — GLYCOPYRROLATE 0.2 MG/ML
INJECTION INTRAMUSCULAR; INTRAVENOUS AS NEEDED
Status: DISCONTINUED | OUTPATIENT
Start: 2024-03-11 | End: 2024-03-11 | Stop reason: SURG

## 2024-03-11 RX ORDER — MIDAZOLAM HYDROCHLORIDE 1 MG/ML
2 INJECTION INTRAMUSCULAR; INTRAVENOUS ONCE
Status: COMPLETED | OUTPATIENT
Start: 2024-03-11 | End: 2024-03-11

## 2024-03-11 RX ORDER — SODIUM CHLORIDE, SODIUM LACTATE, POTASSIUM CHLORIDE, CALCIUM CHLORIDE 600; 310; 30; 20 MG/100ML; MG/100ML; MG/100ML; MG/100ML
1000 INJECTION, SOLUTION INTRAVENOUS CONTINUOUS
Status: DISCONTINUED | OUTPATIENT
Start: 2024-03-11 | End: 2024-03-11 | Stop reason: HOSPADM

## 2024-03-11 RX ORDER — HYDROCODONE BITARTRATE AND ACETAMINOPHEN 5; 325 MG/1; MG/1
1 TABLET ORAL EVERY 4 HOURS PRN
Status: DISCONTINUED | OUTPATIENT
Start: 2024-03-11 | End: 2024-03-11 | Stop reason: HOSPADM

## 2024-03-11 RX ORDER — FENTANYL CITRATE 50 UG/ML
INJECTION, SOLUTION INTRAMUSCULAR; INTRAVENOUS AS NEEDED
Status: DISCONTINUED | OUTPATIENT
Start: 2024-03-11 | End: 2024-03-11 | Stop reason: SURG

## 2024-03-11 RX ORDER — OXYCODONE HYDROCHLORIDE AND ACETAMINOPHEN 5; 325 MG/1; MG/1
1 TABLET ORAL EVERY 4 HOURS PRN
Qty: 12 TABLET | Refills: 0 | Status: SHIPPED | OUTPATIENT
Start: 2024-03-11 | End: 2024-03-15 | Stop reason: SDUPTHER

## 2024-03-11 RX ORDER — LABETALOL HYDROCHLORIDE 5 MG/ML
5 INJECTION, SOLUTION INTRAVENOUS
Status: DISCONTINUED | OUTPATIENT
Start: 2024-03-11 | End: 2024-03-11 | Stop reason: HOSPADM

## 2024-03-11 RX ORDER — SODIUM CHLORIDE 0.9 % (FLUSH) 0.9 %
10 SYRINGE (ML) INJECTION AS NEEDED
Status: DISCONTINUED | OUTPATIENT
Start: 2024-03-11 | End: 2024-03-11 | Stop reason: HOSPADM

## 2024-03-11 RX ORDER — GABAPENTIN 300 MG/1
600 CAPSULE ORAL ONCE
Status: COMPLETED | OUTPATIENT
Start: 2024-03-11 | End: 2024-03-11

## 2024-03-11 RX ORDER — ROCURONIUM BROMIDE 10 MG/ML
INJECTION, SOLUTION INTRAVENOUS AS NEEDED
Status: DISCONTINUED | OUTPATIENT
Start: 2024-03-11 | End: 2024-03-11 | Stop reason: SURG

## 2024-03-11 RX ORDER — LIDOCAINE HYDROCHLORIDE 20 MG/ML
INJECTION, SOLUTION EPIDURAL; INFILTRATION; INTRACAUDAL; PERINEURAL AS NEEDED
Status: DISCONTINUED | OUTPATIENT
Start: 2024-03-11 | End: 2024-03-11 | Stop reason: SURG

## 2024-03-11 RX ORDER — DEXAMETHASONE SODIUM PHOSPHATE 4 MG/ML
INJECTION, SOLUTION INTRA-ARTICULAR; INTRALESIONAL; INTRAMUSCULAR; INTRAVENOUS; SOFT TISSUE AS NEEDED
Status: DISCONTINUED | OUTPATIENT
Start: 2024-03-11 | End: 2024-03-11 | Stop reason: SURG

## 2024-03-11 RX ORDER — ONDANSETRON 2 MG/ML
4 INJECTION INTRAMUSCULAR; INTRAVENOUS ONCE AS NEEDED
Status: COMPLETED | OUTPATIENT
Start: 2024-03-11 | End: 2024-03-11

## 2024-03-11 RX ORDER — DROPERIDOL 2.5 MG/ML
0.62 INJECTION, SOLUTION INTRAMUSCULAR; INTRAVENOUS ONCE AS NEEDED
Status: COMPLETED | OUTPATIENT
Start: 2024-03-11 | End: 2024-03-11

## 2024-03-11 RX ORDER — SODIUM CHLORIDE, SODIUM LACTATE, POTASSIUM CHLORIDE, CALCIUM CHLORIDE 600; 310; 30; 20 MG/100ML; MG/100ML; MG/100ML; MG/100ML
100 INJECTION, SOLUTION INTRAVENOUS CONTINUOUS
Status: DISCONTINUED | OUTPATIENT
Start: 2024-03-11 | End: 2024-03-11 | Stop reason: HOSPADM

## 2024-03-11 RX ORDER — ONDANSETRON 2 MG/ML
INJECTION INTRAMUSCULAR; INTRAVENOUS AS NEEDED
Status: DISCONTINUED | OUTPATIENT
Start: 2024-03-11 | End: 2024-03-11 | Stop reason: SURG

## 2024-03-11 RX ORDER — SCOLOPAMINE TRANSDERMAL SYSTEM 1 MG/1
1 PATCH, EXTENDED RELEASE TRANSDERMAL CONTINUOUS
Status: DISCONTINUED | OUTPATIENT
Start: 2024-03-11 | End: 2024-03-11 | Stop reason: HOSPADM

## 2024-03-11 RX ORDER — MAGNESIUM HYDROXIDE 1200 MG/15ML
LIQUID ORAL AS NEEDED
Status: DISCONTINUED | OUTPATIENT
Start: 2024-03-11 | End: 2024-03-11 | Stop reason: HOSPADM

## 2024-03-11 RX ORDER — LIDOCAINE HYDROCHLORIDE 10 MG/ML
0.5 INJECTION, SOLUTION EPIDURAL; INFILTRATION; INTRACAUDAL; PERINEURAL ONCE AS NEEDED
Status: DISCONTINUED | OUTPATIENT
Start: 2024-03-11 | End: 2024-03-11 | Stop reason: HOSPADM

## 2024-03-11 RX ORDER — NEOSTIGMINE METHYLSULFATE 5 MG/5 ML
SYRINGE (ML) INTRAVENOUS AS NEEDED
Status: DISCONTINUED | OUTPATIENT
Start: 2024-03-11 | End: 2024-03-11 | Stop reason: SURG

## 2024-03-11 RX ORDER — SODIUM CHLORIDE 0.9 % (FLUSH) 0.9 %
3 SYRINGE (ML) INJECTION EVERY 12 HOURS SCHEDULED
Status: DISCONTINUED | OUTPATIENT
Start: 2024-03-11 | End: 2024-03-11 | Stop reason: HOSPADM

## 2024-03-11 RX ORDER — PROPOFOL 10 MG/ML
VIAL (ML) INTRAVENOUS AS NEEDED
Status: DISCONTINUED | OUTPATIENT
Start: 2024-03-11 | End: 2024-03-11 | Stop reason: SURG

## 2024-03-11 RX ORDER — SODIUM CHLORIDE 0.9 % (FLUSH) 0.9 %
3-10 SYRINGE (ML) INJECTION AS NEEDED
Status: DISCONTINUED | OUTPATIENT
Start: 2024-03-11 | End: 2024-03-11 | Stop reason: HOSPADM

## 2024-03-11 RX ORDER — BUPIVACAINE HCL/0.9 % NACL/PF 0.125 %
PLASTIC BAG, INJECTION (ML) EPIDURAL AS NEEDED
Status: DISCONTINUED | OUTPATIENT
Start: 2024-03-11 | End: 2024-03-11 | Stop reason: SURG

## 2024-03-11 RX ORDER — ACETAMINOPHEN 500 MG
1000 TABLET ORAL ONCE
Status: COMPLETED | OUTPATIENT
Start: 2024-03-11 | End: 2024-03-11

## 2024-03-11 RX ORDER — SODIUM CHLORIDE 0.9 % (FLUSH) 0.9 %
3 SYRINGE (ML) INJECTION AS NEEDED
Status: DISCONTINUED | OUTPATIENT
Start: 2024-03-11 | End: 2024-03-11 | Stop reason: HOSPADM

## 2024-03-11 RX ORDER — PHENAZOPYRIDINE HYDROCHLORIDE 200 MG/1
200 TABLET, FILM COATED ORAL ONCE
Status: COMPLETED | OUTPATIENT
Start: 2024-03-11 | End: 2024-03-11

## 2024-03-11 RX ORDER — MIDAZOLAM HYDROCHLORIDE 1 MG/ML
1 INJECTION INTRAMUSCULAR; INTRAVENOUS
Status: DISCONTINUED | OUTPATIENT
Start: 2024-03-11 | End: 2024-03-11 | Stop reason: HOSPADM

## 2024-03-11 RX ORDER — NALOXONE HCL 0.4 MG/ML
0.4 VIAL (ML) INJECTION AS NEEDED
Status: DISCONTINUED | OUTPATIENT
Start: 2024-03-11 | End: 2024-03-11 | Stop reason: HOSPADM

## 2024-03-11 RX ORDER — FENTANYL CITRATE 50 UG/ML
50 INJECTION, SOLUTION INTRAMUSCULAR; INTRAVENOUS
Status: COMPLETED | OUTPATIENT
Start: 2024-03-11 | End: 2024-03-11

## 2024-03-11 RX ORDER — SODIUM CHLORIDE 0.9 % (FLUSH) 0.9 %
10 SYRINGE (ML) INJECTION EVERY 12 HOURS SCHEDULED
Status: DISCONTINUED | OUTPATIENT
Start: 2024-03-11 | End: 2024-03-11 | Stop reason: HOSPADM

## 2024-03-11 RX ORDER — ACETAMINOPHEN 500 MG
1000 TABLET ORAL ONCE
Status: DISCONTINUED | OUTPATIENT
Start: 2024-03-11 | End: 2024-03-11 | Stop reason: HOSPADM

## 2024-03-11 RX ORDER — FLUMAZENIL 0.1 MG/ML
0.2 INJECTION INTRAVENOUS AS NEEDED
Status: DISCONTINUED | OUTPATIENT
Start: 2024-03-11 | End: 2024-03-11 | Stop reason: HOSPADM

## 2024-03-11 RX ORDER — HYDROCODONE BITARTRATE AND ACETAMINOPHEN 10; 325 MG/1; MG/1
1 TABLET ORAL EVERY 4 HOURS PRN
Status: DISCONTINUED | OUTPATIENT
Start: 2024-03-11 | End: 2024-03-11 | Stop reason: HOSPADM

## 2024-03-11 RX ADMIN — Medication 200 MCG: at 12:34

## 2024-03-11 RX ADMIN — MIDAZOLAM HYDROCHLORIDE 2 MG: 1 INJECTION, SOLUTION INTRAMUSCULAR; INTRAVENOUS at 09:35

## 2024-03-11 RX ADMIN — SODIUM CHLORIDE, POTASSIUM CHLORIDE, SODIUM LACTATE AND CALCIUM CHLORIDE 100 ML/HR: 600; 310; 30; 20 INJECTION, SOLUTION INTRAVENOUS at 14:38

## 2024-03-11 RX ADMIN — ROCURONIUM BROMIDE 50 MG: 10 INJECTION, SOLUTION INTRAVENOUS at 10:14

## 2024-03-11 RX ADMIN — SODIUM CHLORIDE, POTASSIUM CHLORIDE, SODIUM LACTATE AND CALCIUM CHLORIDE 100 ML/HR: 600; 310; 30; 20 INJECTION, SOLUTION INTRAVENOUS at 13:43

## 2024-03-11 RX ADMIN — GLYCOPYRROLATE 0.4 MG: 0.2 INJECTION INTRAMUSCULAR; INTRAVENOUS at 13:07

## 2024-03-11 RX ADMIN — LIDOCAINE HYDROCHLORIDE 100 MG: 20 INJECTION, SOLUTION EPIDURAL; INFILTRATION; INTRACAUDAL; PERINEURAL at 10:14

## 2024-03-11 RX ADMIN — DROPERIDOL 0.62 MG: 2.5 INJECTION, SOLUTION INTRAMUSCULAR; INTRAVENOUS at 09:35

## 2024-03-11 RX ADMIN — SODIUM CHLORIDE, POTASSIUM CHLORIDE, SODIUM LACTATE AND CALCIUM CHLORIDE 1000 ML: 600; 310; 30; 20 INJECTION, SOLUTION INTRAVENOUS at 07:24

## 2024-03-11 RX ADMIN — PROPOFOL 50 MG: 10 INJECTION, EMULSION INTRAVENOUS at 12:24

## 2024-03-11 RX ADMIN — Medication 200 MCG: at 12:54

## 2024-03-11 RX ADMIN — DEXAMETHASONE SODIUM PHOSPHATE 4 MG: 4 INJECTION, SOLUTION INTRA-ARTICULAR; INTRALESIONAL; INTRAMUSCULAR; INTRAVENOUS; SOFT TISSUE at 10:23

## 2024-03-11 RX ADMIN — SCOPALAMINE 1 PATCH: 1 PATCH, EXTENDED RELEASE TRANSDERMAL at 09:18

## 2024-03-11 RX ADMIN — CEFAZOLIN 2000 MG: 2 INJECTION, POWDER, FOR SOLUTION INTRAMUSCULAR; INTRAVENOUS at 10:23

## 2024-03-11 RX ADMIN — Medication 200 MCG: at 12:31

## 2024-03-11 RX ADMIN — DROPERIDOL 0.62 MG: 2.5 INJECTION, SOLUTION INTRAMUSCULAR; INTRAVENOUS at 13:31

## 2024-03-11 RX ADMIN — Medication 200 MCG: at 10:41

## 2024-03-11 RX ADMIN — FENTANYL CITRATE 100 MCG: 50 INJECTION, SOLUTION INTRAMUSCULAR; INTRAVENOUS at 10:14

## 2024-03-11 RX ADMIN — SODIUM CHLORIDE, POTASSIUM CHLORIDE, SODIUM LACTATE AND CALCIUM CHLORIDE 1000 ML: 600; 310; 30; 20 INJECTION, SOLUTION INTRAVENOUS at 07:23

## 2024-03-11 RX ADMIN — FENTANYL CITRATE 50 MCG: 50 INJECTION, SOLUTION INTRAMUSCULAR; INTRAVENOUS at 14:12

## 2024-03-11 RX ADMIN — PHENAZOPYRIDINE HYDROCHLORIDE 200 MG: 200 TABLET ORAL at 07:36

## 2024-03-11 RX ADMIN — FENTANYL CITRATE 50 MCG: 50 INJECTION, SOLUTION INTRAMUSCULAR; INTRAVENOUS at 11:23

## 2024-03-11 RX ADMIN — Medication 200 MCG: at 11:37

## 2024-03-11 RX ADMIN — Medication 200 MCG: at 10:37

## 2024-03-11 RX ADMIN — Medication 3 MG: at 13:07

## 2024-03-11 RX ADMIN — FENTANYL CITRATE 50 MCG: 50 INJECTION, SOLUTION INTRAMUSCULAR; INTRAVENOUS at 13:48

## 2024-03-11 RX ADMIN — Medication 200 MCG: at 11:01

## 2024-03-11 RX ADMIN — ACETAMINOPHEN TAB 500 MG 1000 MG: 500 TAB at 07:36

## 2024-03-11 RX ADMIN — ONDANSETRON 4 MG: 2 INJECTION INTRAMUSCULAR; INTRAVENOUS at 13:48

## 2024-03-11 RX ADMIN — FENTANYL CITRATE 50 MCG: 50 INJECTION, SOLUTION INTRAMUSCULAR; INTRAVENOUS at 11:20

## 2024-03-11 RX ADMIN — PROPOFOL 50 MG: 10 INJECTION, EMULSION INTRAVENOUS at 12:20

## 2024-03-11 RX ADMIN — PROPOFOL 50 MG: 10 INJECTION, EMULSION INTRAVENOUS at 10:22

## 2024-03-11 RX ADMIN — PROPOFOL 150 MG: 10 INJECTION, EMULSION INTRAVENOUS at 10:14

## 2024-03-11 RX ADMIN — GABAPENTIN 600 MG: 300 CAPSULE ORAL at 07:36

## 2024-03-11 RX ADMIN — Medication 100 MCG: at 10:23

## 2024-03-11 RX ADMIN — Medication 100 MCG: at 10:33

## 2024-03-11 RX ADMIN — ONDANSETRON 4 MG: 2 INJECTION INTRAMUSCULAR; INTRAVENOUS at 12:21

## 2024-03-11 NOTE — ANESTHESIA PREPROCEDURE EVALUATION
Anesthesia Evaluation     Patient summary reviewed   no history of anesthetic complications:   NPO Solid Status: > 6 hours             Airway   Mallampati: II  Dental      Pulmonary    (-) sleep apnea, not a smoker  Cardiovascular - negative cardio ROS        Neuro/Psych  (+) psychiatric history  GI/Hepatic/Renal/Endo    (+) obesity, PUD, diabetes mellitus    Musculoskeletal     Abdominal   (+) obese   Substance History      OB/GYN          Other                      Anesthesia Plan    ASA 2     general     (Multimodal ponv ppx )  intravenous induction     Anesthetic plan, risks, benefits, and alternatives have been provided, discussed and informed consent has been obtained with: patient.      CODE STATUS:    Level Of Support Discussed With: Patient  Code Status (Patient has no pulse and is not breathing): CPR (Attempt to Resuscitate)  Medical Interventions (Patient has pulse or is breathing): Full Support

## 2024-03-11 NOTE — ANESTHESIA POSTPROCEDURE EVALUATION
Patient: Tim Saenz    Procedure Summary       Date: 03/11/24 Room / Location:  [facility] OR 06 /  [facility]    Anesthesia Start: 1011 Anesthesia Stop: 1317    Procedure: MYOMECTOMY LAPAROSCOPIC WITH DAVINCI ROBOT (Abdomen) Diagnosis:       Menorrhagia with regular cycle      (Menorrhagia with regular cycle [N92.0])    Surgeons: Elmira Avitia MD Provider: Lisa Shields CRNA    Anesthesia Type: general ASA Status: 2            Anesthesia Type: general    Vitals  Vitals Value Taken Time   /54 03/11/24 1501   Temp 97.3 °F (36.3 °C) 03/11/24 1315   Pulse 104 03/11/24 1501   Resp 16 03/11/24 1501   SpO2 99 % 03/11/24 1501           Post Anesthesia Care and Evaluation    Patient location during evaluation: PACU  Patient participation: complete - patient participated  Level of consciousness: awake and alert  Pain management: adequate    Airway patency: patent  Anesthetic complications: No anesthetic complications    Cardiovascular status: acceptable  Respiratory status: acceptable  Hydration status: acceptable    Comments: Blood pressure 123/74, pulse 97, temperature 97.3 °F (36.3 °C), temperature source Temporal, resp. rate 16, last menstrual period 02/06/2024, SpO2 99%, not currently breastfeeding.    Pt discharged from PACU based on rocío score >8

## 2024-03-11 NOTE — H&P
Subjective   No chief complaint on file.    Tim Saenz is a 28 y.o. year old No obstetric history on file..  Patient's last menstrual period was 02/06/2024 (exact date).  She presents to be seen because of heavy menses that include large clots.  Tim reports that bleeding seemed to be worse over the past year.  Patient has already seen Dr. Hurley in [place] (and someone else before that), but was referred to [place] for uterine fibroids.  Patient reports she had both an u/s and an MRI in [place].   Patient was referred to [facility] to see a specialist there for myomectomy; she was scheduled last October and her sugery was cancelled just 5 days prior to her surgery date.  The patient is not interested in going back there because it is so far away, and because there was a lot of hassle with appointments being cancelled and rescheduled so many time.  The patient reports that her surgery was planned to be done laparoscopically and is hoping that she can do that here.    Patient was here last month, but copies of her previous u/s and MRI were not available at that time.  She returns today hoping to plan surgery, and has provided previous imaging.  The link to her MRI is copied below.    Patient seeing Heme/Onc for chronic blood loss anemia due to her menorrhagia.  She has not required a transfusion yet.  Menses are regular and occur ever 23-25 days.  Flow is excessively heavy.    The following portions of the patient's history were reviewed and updated as appropriate:current medications and allergies    Social History    Tobacco Use      Smoking status: Never      Smokeless tobacco: Never    Past Medical History:   Diagnosis Date    Back pain     Chronic pain     Depression     Diabetes mellitus, type 2     History of stomach ulcers     GLORIA (iron deficiency anemia)     Social anxiety disorder       Family History   Problem Relation Age of Onset    Uterine cancer Mother 29    Breast cancer Paternal Grandmother      Breast cancer Maternal Aunt       Past Surgical History:   Procedure Laterality Date    CARPAL TUNNEL RELEASE Bilateral     COLONOSCOPY      EAR TUBES      ENDOSCOPY      WISDOM TOOTH EXTRACTION      x2      Review of Systems   Constitutional:  Negative for activity change and unexpected weight change.   Respiratory:  Negative for shortness of breath.    Cardiovascular:  Negative for chest pain.   Genitourinary:  Positive for menstrual problem (heavy, painful, with large clots).         Objective   /81 (BP Location: Left arm, Patient Position: Standing)   Pulse 77   Temp 96.6 °F (35.9 °C) (Temporal)   Resp 16   LMP 02/06/2024 (Exact Date)   SpO2 100%   Breastfeeding No     Physical Exam  Vitals and nursing note reviewed.   Constitutional:       General: She is not in acute distress.     Appearance: She is well-developed.   HENT:      Head: Normocephalic and atraumatic.   Neck:      Thyroid: No thyromegaly.   Pulmonary:      Effort: Pulmonary effort is normal.   Musculoskeletal:         General: Normal range of motion.      Cervical back: Normal range of motion.   Skin:     General: Skin is warm and dry.   Neurological:      Mental Status: She is alert and oriented to person, place, and time.   Psychiatric:         Mood and Affect: Mood normal.         Behavior: Behavior normal.         Thought Content: Thought content normal.         Judgment: Judgment normal.        Time Zone Mismatch    Time-sensitive data might be out of sequence or missing. Information for this order was recently documented in a time zone different from the current session’s time zone. To edit order documentation and ensure all information is up to date, log in from the order’s time zone.  Order's time zone: [facility]/[place]   Current session's time zone: [facility]     SCANNED - IMAGING [[id number]] (Order [id number])  Order  Status: Final result     Scans on Order [id number]    Scan on 1/18/2024 1512 by Yaa Hurley MD: MRI  PELVIS O        Imaging    SCANNED - IMAGING (Order: [id number]) - 2023  Percentile Graphs    No pregnancy episode available       SCANNED - IMAGING: Patient Communication     Add Comments   Add Notifications      Exam Information    Status Exam Begun  Exam Ended    Final [99]          Encounter    View Encounter             External Results Report    Open External Results Report       Lab Review   No data reviewed    Imaging   No data reviewed : patient signing records release today, to get u/s and MRI from [facility].    Assessment & Plan    Diagnoses and all orders for this visit:    1. Menorrhagia with regular cycle (Primary): Patient desires a da Miguel Angel laparoscopic myomectomy, since that is what she was planned for previously, and because she desires pregnancy in the future.  I have confirmed that the patient understands  delivery will be necessary for any future pregnancies.  I have also recommended that pregnancy not occur for at least one year following surgery.    Risks of laparoscopic surgery were reviewed to include, but are not limited to, the possibility of bowel perforation requiring possible bowel resection and/or colostomy, possible bladder injury or possible and possible vascular injury which could require the need for a blood transfusion.  Possible need for conversion to a laparotomy was also discussed.  The patient's questions were answered to her satisfaction.  Post-op restrictions and typical post-op course were also reviewed.   She is hoping to have cryopreservation of eggs in the future, but says that will be several years from now.    2. Dysmenorrhea    3. Type 2 diabetes mellitus without complication, without long-term current use of insulin: Patient previously on metformin, and has been transitioned to Mounjaro.  She reports that diabetes is well-controlled.  She has never been on insulin    4. Iron deficiency anemia: Patient having first iron infusion today    5. Gastroesophageal  reflux disease with esophagitis and hemorrhage       This note was electronically signed.    Patient seen in holding area and had no questions or concerns about scheduled procedure.    Elmira Avitia MD  March 11, 2024  10:01 CDT

## 2024-03-11 NOTE — OP NOTE
[facility]    Tim Saenz  [id number]  [id number]  3/11/2024      Myomectomy Procedure Note      Pre-operative Diagnosis: Menorrhagia and Fibroids    Post-operative Diagnosis: Menorrhagia and Fibroids    Operation:  robotic myomectomy with DaVinci     Surgeon: Elmira Avitia MD, FACOG    Assistants: OR Staff    Anesthesia: General endotracheal anesthesia, General    Findings: Fibroid uterus    Estimated Blood Loss: 500    IVF: 1400 ml    Specimens:  fibroid    Complications:  None    Disposition: PACU - hemodynamically stable.      Procedure Details    The patient was seen in the Holding Room. The risks, benefits, complications, treatment options, and expected outcomes were discussed with the patient. The patient concurred with the proposed plan, giving informed consent. The patient was identified as Tim Saenz and the procedure verified as Robotic myomectomy. A Time Out was held and the above information confirmed.    After these above consents were obtained the patient was taken to the  operating room, where general anesthesia was administered. She was placed in  the dorsal lithotomy position with care taken in placement of her legs to avoid  nerve injury. She was prepped and draped in the usual sterile fashion. Moran  catheter was inserted. A complete procedural timeout was completed to ensure  proper patient and proper procedure.    A supraumbilical skin incision  was made with a knife and the Veress needle was inserted carefully and without  difficulty. The abdomen insufflated to 15 mmHg. An 8 mm DaVinci trocar was inserted without difficulty and proper placement was confirmed with the DaVinci scope.  Areas that were immediately  adjacent to entry were free of injury. Two additional 8 mm da Miguel Angel trocars  were placed under direct visualization and one 8 mm AirSeal trocar was placed under direct visualization without difficulty or complication or injury.      The patient was then placed in  Trendelenburg position and a  [place] uterine manipulator, colpotomizer and vaginal occluder were inserted  under direct visualization. The robot was docked using a side docking  technique. Monopolar scissors were placed in arm #1 and bipolar PK instrument into  arm #2.  Examination of the pelvis showed the above findings. The ureters were  identified bilaterally.  Attention was then turned to the 7 to 8 cm fibroid in the fundus of the uterus.  The monopolar scissors were used to make a linear incision through the serosal surface of the uterine fundus overlying the fibroid.  The fibroid was then shelled out from the fundus of the uterus, using the bipolar grasper to cauterize and control bleeding, as best as possible, as progress was being made.  Once the fibroid was completely removed from the uterus it was dropped into the pelvis.  STRATAFIX barbed suture was used to reapproximate the resulting defect.  4 separate STRATAFIX sutures were used to close the defect in the myometrium in a multilayer fashion.  Although the endometrial cavity was definitely entered, the surgical site was remote from both fallopian tubes and I feel certain that neither of them were affected by the procedure.  Surgicel powder was then used to coat the surface of the uterus to help prevent adhesions as the patient was healing.  The surgical site was inspected 1 final time and found to be hemostatic.  The uterus was irrigated and suctioned dry.  The large fibroid was then placed in an Endoloop pouch for removal.    The Endoloop pouch containing the fibroid was pulled to the abdominal surface after all the trocars had been removed and insufflation gas evacuated.  That trocar site had to be extended to an approximately 4 cm incision since the fibroid was a large.  After removal of the fibroid, fascia at this incision was grasped with Kocher clamps on both sides and reapproximated with 0 Vicryl on a UR 6 needle.  Subcutaneous tissue was also  reapproximated in a running fashion.  Skin at each incision was closed with 4-0 Vicryl in a subcuticular fashion.  Surgical glue was placed over each incision as a bandage.  The Vcare uterine manipulator was then removed from the patient's uterus.    The patient tolerated the procedure well. All instrument counts were correct. She was taken to the recovery room after extubation in stable condition.     The largest of the patient's 2 fibroids was completely removed and hopefully this will have a beneficial effect on the patient's menstrual cycles.  The smaller, 3 cm fibroid, was not removed because it was very close to the patient's left fallopian tube and I feared it would damage that tube and affect her future fertility.    Elmira Avitia MD  3/11/2024  13:15 CDT

## 2024-03-11 NOTE — ANESTHESIA PROCEDURE NOTES
Airway  Date/Time: 3/11/2024 10:20 AM  Airway not difficult    General Information and Staff    Patient location during procedure: OR  CRNA/CAA: Divina Cesar CRNA    Indications and Patient Condition  Indications for airway management: airway protection    Preoxygenated: yes  Mask difficulty assessment: 2 - vent by mask + OA or adjuvant +/- NMBA    Final Airway Details  Final airway type: endotracheal airway      Successful airway: ETT  Cuffed: yes   Successful intubation technique: direct laryngoscopy  Facilitating devices/methods: intubating stylet  Endotracheal tube insertion site: oral  Blade: Diane  Blade size: 3  ETT size (mm): 7.0  Cormack-Lehane Classification: grade IIb - view of arytenoids or posterior of glottis only  Placement verified by: chest auscultation   Cuff volume (mL): 8  Measured from: teeth  ETT/EBT  to teeth (cm): 20  Number of attempts at approach: 1  Assessment: lips, teeth, and gum same as pre-op and atraumatic intubation

## 2024-03-14 DIAGNOSIS — N92.0 MENORRHAGIA WITH REGULAR CYCLE: ICD-10-CM

## 2024-03-14 DIAGNOSIS — D25.9 UTERINE LEIOMYOMA, UNSPECIFIED LOCATION: Primary | ICD-10-CM

## 2024-03-14 RX ORDER — MISOPROSTOL 200 UG/1
600 TABLET ORAL EVERY 6 HOURS
Qty: 15 TABLET | Refills: 0 | Status: SHIPPED | OUTPATIENT
Start: 2024-03-14 | End: 2024-03-16

## 2024-03-14 NOTE — TELEPHONE ENCOUNTER
Pt called and stated she had sx on Monday with Dr. Avitia and is having heavy bleeding and clots.  I spoke with Dr. Avitia and she informed me that this is definitely due to the fibroid she removed on Monday.  She wants her to do 600mcg of Cytotec Q6H x 5 doses to help with bleeding.  Pt informed me her scopolamine patch came off and was experiencing nausea.  Dr. Avitia informed me we could call in [facility] for pt, pt stated that she has some and was taking 4mg Q8H, per Dr. Avitia, informed pt to take it Q4H, pt voiced understanding.

## 2024-03-15 DIAGNOSIS — Z98.890 S/P MYOMECTOMY: ICD-10-CM

## 2024-03-15 LAB
CYTO UR: NORMAL
LAB AP CASE REPORT: NORMAL
LAB AP INTRADEPARTMENTAL CONSULT: NORMAL
Lab: NORMAL
PATH REPORT.FINAL DX SPEC: NORMAL
PATH REPORT.GROSS SPEC: NORMAL

## 2024-03-15 RX ORDER — OXYCODONE HYDROCHLORIDE AND ACETAMINOPHEN 5; 325 MG/1; MG/1
1 TABLET ORAL EVERY 4 HOURS PRN
Qty: 12 TABLET | Refills: 0 | Status: SHIPPED | OUTPATIENT
Start: 2024-03-15

## 2024-03-19 ENCOUNTER — TELEPHONE (OUTPATIENT)
Dept: OBSTETRICS AND GYNECOLOGY | Age: 29
End: 2024-03-19
Payer: COMMERCIAL

## 2024-03-19 NOTE — TELEPHONE ENCOUNTER
"Pt had myomectomy on 03/11/24. She calls this morning stating she has noticed that her incision on the right side is \"hard\" around the outside. Pt denies any abnormal pain, redness, or drainage. Pt advised to continue to monitor. Pt advised what she is feeling could be part of the process of the incision healing/scar tissue. Pt advised to contact office if she notices any of the above. Pt voiced understanding  "

## 2024-03-26 ENCOUNTER — OFFICE VISIT (OUTPATIENT)
Dept: OBSTETRICS AND GYNECOLOGY | Age: 29
End: 2024-03-26
Payer: COMMERCIAL

## 2024-03-26 VITALS
SYSTOLIC BLOOD PRESSURE: 122 MMHG | HEIGHT: 66 IN | DIASTOLIC BLOOD PRESSURE: 82 MMHG | WEIGHT: 218 LBS | BODY MASS INDEX: 35.03 KG/M2

## 2024-03-26 DIAGNOSIS — N39.0 URINARY TRACT INFECTION WITHOUT HEMATURIA, SITE UNSPECIFIED: Primary | ICD-10-CM

## 2024-03-26 DIAGNOSIS — Z98.890 S/P MYOMECTOMY: ICD-10-CM

## 2024-03-26 DIAGNOSIS — Z09 S/P GYNECOLOGICAL SURGERY, FOLLOW-UP EXAM: ICD-10-CM

## 2024-03-26 PROCEDURE — 99024 POSTOP FOLLOW-UP VISIT: CPT | Performed by: OBSTETRICS & GYNECOLOGY

## 2024-03-26 NOTE — PROGRESS NOTES
"Subjective   Chief Complaint   Patient presents with    Post-op     Pt here today for 2 week post op Davinci Myomectomy. Pt voices still having pain since surgery. Pt voices that she has had a burning pain in her pelvis. Pt voices that she had some burning with urination when her period started. Pt also having some trocar site pain as well that feels like shooting pain. Pt voices that the cytotec did help her heavy clotting she was having after surgery. Pt voices no other concerns.      Tim Saenz is a 28 y.o. year old No obstetric history on file. presenting to be seen for her post-operative visit.  She had a laparoscopic myomectomy 2 weeks ago.  Currently she reports still having pain, mostly at RLQ trocar site which had to be extended to remove fibroid.  Patient says she feels a \"burning\" sensation whenever she strains to have a bowel movement or engages muscles to void - but the pain is up where that incision is, and not at her perineum or bladder.  Pain seems to be slowly getting better.  She reports that this area was also \"swollen\" a lot more than the L side of her abdomen, but that has improved too.    No Additional Complaints Reported    The following portions of the patient's history were reviewed and updated as appropriate:current medications and allergies    Review of Systems      Objective   /82   Ht 167.6 cm (66\")   Wt 98.9 kg (218 lb)   LMP 03/23/2024 (Exact Date)   BMI 35.19 kg/m²     General:  well developed; well nourished  no acute distress   Abdomen: soft, non-tender; no masses  incisions are healing, clean, dry, intact, and without drainage.  Still somewhat tender over RLQ incision, with palpable firmness beneath that is likely a resolving hematoma.  No induration or erythema of skin.   Pelvis: Not performed.     Case Report   Surgical Pathology Report                         Case: [id number]                                   Authorizing Provider:  Elmira Avitia MD         " "Collected:           03/11/2024 10:58 AM           Ordering Location:     [facility] OR  Received:            03/11/2024 02:19 PM           Pathologist:           Chastity Diaz MD                                                         Specimen:    Fibroid, Uterus, Uterine Fibroid                                                          Final Diagnosis   \"Uterine fibroid\": Segments of angioleiomyoma.   Electronically signed by Chastity Diaz MD on 3/15/2024 at 1313   Intradepartmental Consult    Intradepartmental consultation was obtained from Dr. Coleman who reviewed slide 1A.  He concurs that the spindle cells present are of smooth muscle origin.        Assessment   Pt is 2 weeks s/p laparoscopic myomectomy  Hematoma vs. Seroma at RLQ trocar site, but patient is not interested in CT scan with possible drainage by radiology.  She feels like discomfort has improved and she is happy to just have an explanation for what is likely going on.  Pathology reviewed.  Patient's questions answered.  She understands that this was not a typical fibroid.  She also understands that the smaller of her two fibroids was left in-situ since I was concerned about possible damage to her fallopian tube     Plan   Slowly increase activity  Continue not soaking in water   Continue pelvic rest  RTO in 4 weeks.    No orders of the defined types were placed in this encounter.         This note was electronically signed.    Elmira Avitia MD  March 26, 2024  "

## 2024-03-29 LAB
APPEARANCE UR: ABNORMAL
BACTERIA #/AREA URNS HPF: ABNORMAL /HPF
BACTERIA UR CULT: ABNORMAL
BACTERIA UR CULT: ABNORMAL
BILIRUB UR QL STRIP: ABNORMAL
CASTS URNS MICRO: ABNORMAL
COLOR UR: ABNORMAL
EPI CELLS #/AREA URNS HPF: ABNORMAL /HPF
GLUCOSE UR QL STRIP: NEGATIVE
HGB UR QL STRIP: ABNORMAL
KETONES UR QL STRIP: NEGATIVE
LEUKOCYTE ESTERASE UR QL STRIP: ABNORMAL
NITRITE UR QL STRIP: NEGATIVE
PH UR STRIP: 5.5 [PH] (ref 5–8)
PROT UR QL STRIP: ABNORMAL
RBC #/AREA URNS HPF: ABNORMAL /HPF
SP GR UR STRIP: ABNORMAL (ref 1–1.03)
UROBILINOGEN UR STRIP-MCNC: ABNORMAL MG/DL
WBC #/AREA URNS HPF: ABNORMAL /HPF
YEAST #/AREA URNS HPF: ABNORMAL /HPF

## 2024-04-01 DIAGNOSIS — N30.00 ACUTE CYSTITIS WITHOUT HEMATURIA: Primary | ICD-10-CM

## 2024-04-01 RX ORDER — AMPICILLIN 500 MG/1
500 CAPSULE ORAL 4 TIMES DAILY
Qty: 28 CAPSULE | Refills: 0 | Status: SHIPPED | OUTPATIENT
Start: 2024-04-01

## 2024-04-30 ENCOUNTER — OFFICE VISIT (OUTPATIENT)
Dept: OBSTETRICS AND GYNECOLOGY | Age: 29
End: 2024-04-30
Payer: COMMERCIAL

## 2024-04-30 VITALS
SYSTOLIC BLOOD PRESSURE: 116 MMHG | DIASTOLIC BLOOD PRESSURE: 82 MMHG | BODY MASS INDEX: 34.87 KG/M2 | WEIGHT: 217 LBS | HEIGHT: 66 IN

## 2024-04-30 DIAGNOSIS — Z09 S/P GYNECOLOGICAL SURGERY, FOLLOW-UP EXAM: ICD-10-CM

## 2024-04-30 DIAGNOSIS — Z98.890 S/P MYOMECTOMY: Primary | ICD-10-CM

## 2024-04-30 PROCEDURE — 99024 POSTOP FOLLOW-UP VISIT: CPT | Performed by: OBSTETRICS & GYNECOLOGY

## 2024-04-30 RX ORDER — CITALOPRAM 20 MG/1
TABLET ORAL
COMMUNITY
Start: 2024-04-24

## 2024-04-30 RX ORDER — MULTIVITAMIN WITH IRON
TABLET ORAL
COMMUNITY

## 2024-04-30 NOTE — PROGRESS NOTES
"Subjective   Chief Complaint   Patient presents with    Post-op     Pt here today for for 6 week post op Davinci Myomectomy.  Pt feeling well no concerns.      Tim Saenz is a 28 y.o. year old No obstetric history on file. presenting to be seen for her post-operative visit.  She had a laparoscopic myomectomy 6 weeks ago.  Currently she reports she is not having pain from surgery any longer.  Patient feels like her last period was still heavy in flow, but the \"full/heavy\" feeling in her pelvis was not there.    No Additional Complaints Reported    The following portions of the patient's history were reviewed and updated as appropriate:current medications and allergies    Review of Systems      Objective   /82   Ht 167.6 cm (66\")   Wt 98.4 kg (217 lb)   LMP 04/16/2024 (Exact Date)   BMI 35.02 kg/m²     General:  well developed; well nourished  no acute distress   Abdomen: soft, non-tender; no masses  incisions are healed   Pelvis: Not performed.     Case Report   Surgical Pathology Report                         Case: [id number]                                   Authorizing Provider:  Elmira Avitia MD         Collected:           03/11/2024 10:58 AM           Ordering Location:     [facility] OR  Received:            03/11/2024 02:19 PM           Pathologist:           Chastity Diaz MD                                                         Specimen:    Fibroid, Uterus, Uterine Fibroid                                                          Final Diagnosis   \"Uterine fibroid\": Segments of angioleiomyoma.   Electronically signed by Chastity Diaz MD on 3/15/2024 at 1313   Intradepartmental Consult    Intradepartmental consultation was obtained from Dr. Coleman who reviewed slide 1A.  He concurs that the spindle cells present are of smooth muscle origin.        Assessment   Pt is 6 weeks s/p laparoscopic myomectomy  Trocar sites all well-healed and non-tender  Pathology reviewed at her " last visit.  Patient's questions answered.  She understands that this was not a typical fibroid.  She also understands that the smaller of her two fibroids was left in-situ since I was concerned about possible damage to her fallopian tube  Patient has already had one period and says that she could tell an improvement in the way she felt     Plan   No restrictions now  Patient will RTO for next annual exam.  She will be due in June    No orders of the defined types were placed in this encounter.         This note was electronically signed.    Elmira Avitia MD  April 30, 2024

## 2024-05-31 ENCOUNTER — OFFICE VISIT (OUTPATIENT)
Dept: ONCOLOGY | Facility: CLINIC | Age: 29
End: 2024-05-31
Payer: COMMERCIAL

## 2024-05-31 ENCOUNTER — LAB (OUTPATIENT)
Dept: LAB | Facility: HOSPITAL | Age: 29
End: 2024-05-31
Payer: COMMERCIAL

## 2024-05-31 VITALS
WEIGHT: 224.3 LBS | HEART RATE: 114 BPM | RESPIRATION RATE: 16 BRPM | SYSTOLIC BLOOD PRESSURE: 118 MMHG | OXYGEN SATURATION: 98 % | TEMPERATURE: 97.8 F | DIASTOLIC BLOOD PRESSURE: 72 MMHG | HEIGHT: 66 IN | BODY MASS INDEX: 36.05 KG/M2

## 2024-05-31 DIAGNOSIS — N92.1 MENORRHAGIA WITH IRREGULAR CYCLE: ICD-10-CM

## 2024-05-31 DIAGNOSIS — D50.9 IRON DEFICIENCY ANEMIA, UNSPECIFIED IRON DEFICIENCY ANEMIA TYPE: Primary | ICD-10-CM

## 2024-05-31 LAB
ALBUMIN SERPL-MCNC: 4 G/DL (ref 3.5–5.2)
ALBUMIN/GLOB SERPL: 1.3 G/DL
ALP SERPL-CCNC: 67 U/L (ref 39–117)
ALT SERPL W P-5'-P-CCNC: <5 U/L (ref 1–33)
ANION GAP SERPL CALCULATED.3IONS-SCNC: 11 MMOL/L (ref 5–15)
AST SERPL-CCNC: 10 U/L (ref 1–32)
BASOPHILS # BLD AUTO: 0.05 10*3/MM3 (ref 0–0.2)
BASOPHILS NFR BLD AUTO: 1.1 % (ref 0–1.5)
BILIRUB SERPL-MCNC: 0.5 MG/DL (ref 0–1.2)
BUN SERPL-MCNC: 5 MG/DL (ref 6–20)
BUN/CREAT SERPL: 8.6 (ref 7–25)
CALCIUM SPEC-SCNC: 8.5 MG/DL (ref 8.6–10.5)
CHLORIDE SERPL-SCNC: 103 MMOL/L (ref 98–107)
CO2 SERPL-SCNC: 24 MMOL/L (ref 22–29)
CREAT SERPL-MCNC: 0.58 MG/DL (ref 0.57–1)
DEPRECATED RDW RBC AUTO: 38.1 FL (ref 37–54)
EGFRCR SERPLBLD CKD-EPI 2021: 128.2 ML/MIN/1.73
EOSINOPHIL # BLD AUTO: 0.11 10*3/MM3 (ref 0–0.4)
EOSINOPHIL NFR BLD AUTO: 2.5 % (ref 0.3–6.2)
ERYTHROCYTE [DISTWIDTH] IN BLOOD BY AUTOMATED COUNT: 13.8 % (ref 12.3–15.4)
FERRITIN SERPL-MCNC: 8.63 NG/ML (ref 13–150)
GLOBULIN UR ELPH-MCNC: 3 GM/DL
GLUCOSE SERPL-MCNC: 77 MG/DL (ref 65–99)
HCT VFR BLD AUTO: 38.5 % (ref 34–46.6)
HGB BLD-MCNC: 11.6 G/DL (ref 12–15.9)
HOLD SPECIMEN: NORMAL
IMM GRANULOCYTES # BLD AUTO: 0.01 10*3/MM3 (ref 0–0.05)
IMM GRANULOCYTES NFR BLD AUTO: 0.2 % (ref 0–0.5)
IRON 24H UR-MRATE: 42 MCG/DL (ref 37–145)
IRON SATN MFR SERPL: 9 % (ref 20–50)
LYMPHOCYTES # BLD AUTO: 1.94 10*3/MM3 (ref 0.7–3.1)
LYMPHOCYTES NFR BLD AUTO: 44.3 % (ref 19.6–45.3)
MCH RBC QN AUTO: 22.8 PG (ref 26.6–33)
MCHC RBC AUTO-ENTMCNC: 30.1 G/DL (ref 31.5–35.7)
MCV RBC AUTO: 75.6 FL (ref 79–97)
MONOCYTES # BLD AUTO: 0.41 10*3/MM3 (ref 0.1–0.9)
MONOCYTES NFR BLD AUTO: 9.4 % (ref 5–12)
NEUTROPHILS NFR BLD AUTO: 1.86 10*3/MM3 (ref 1.7–7)
NEUTROPHILS NFR BLD AUTO: 42.5 % (ref 42.7–76)
NRBC BLD AUTO-RTO: 0 /100 WBC (ref 0–0.2)
PLATELET # BLD AUTO: 336 10*3/MM3 (ref 140–450)
PMV BLD AUTO: 9.3 FL (ref 6–12)
POTASSIUM SERPL-SCNC: 4.2 MMOL/L (ref 3.5–5.2)
PROT SERPL-MCNC: 7 G/DL (ref 6–8.5)
RBC # BLD AUTO: 5.09 10*6/MM3 (ref 3.77–5.28)
SODIUM SERPL-SCNC: 138 MMOL/L (ref 136–145)
TIBC SERPL-MCNC: 446 MCG/DL (ref 298–536)
TRANSFERRIN SERPL-MCNC: 299 MG/DL (ref 200–360)
WBC NRBC COR # BLD AUTO: 4.38 10*3/MM3 (ref 3.4–10.8)

## 2024-05-31 PROCEDURE — 80053 COMPREHEN METABOLIC PANEL: CPT

## 2024-05-31 PROCEDURE — 36415 COLL VENOUS BLD VENIPUNCTURE: CPT

## 2024-05-31 PROCEDURE — 84466 ASSAY OF TRANSFERRIN: CPT

## 2024-05-31 PROCEDURE — 83540 ASSAY OF IRON: CPT

## 2024-05-31 PROCEDURE — 85025 COMPLETE CBC W/AUTO DIFF WBC: CPT

## 2024-05-31 PROCEDURE — 82728 ASSAY OF FERRITIN: CPT

## 2024-05-31 NOTE — LETTER
May 31, 2024       No Recipients    Patient: Tim Saenz   YOB: 1995   Date of Visit: 5/31/2024     Dear Koki Alfonso, DO:       Thank you for referring Tim Saenz to me for evaluation. Below are the relevant portions of my assessment and plan of care.    If you have questions, please do not hesitate to call me. I look forward to following Tim along with you.         Sincerely,        JEFF Nelson        CC:   No Recipients    Cecy Jin APRN  05/31/24 1000  Sign when Signing Visit  [facility] HEMATOLOGY & ONCOLOGY [address]  [facility] [id number] [phone]    Patient Name: Tim Saenz  Encounter Date: 05/31/2024   YOB: 1995  Patient Number: [id number]    PROGRESS NOTE    HISTORY OF PRESENT ILLNESS: Tim Saenz is a 28 y.o. female followed by this office for iron deficiency anemia. History is obtained from patient. History is considered to be accurate.    She has health history significant for DM and iron deficiency, depression/anxiety. She also reports frequent menstruation r/t fibroids which is managed by GYN.  She is taking oral iron once daily.     Scheduled for myomectomy March 11, 2024.    She had Venofer 200 mg on Feb 16, & 29 2024.  She reports some muscle pain after her first infusion.       I have reviewed the HPI and verified with the patient the accuracy of it. No changes to history since the information was documented.  JEFF Nelson.  05/31/2024     INTERVAL HISTORY     Pt presents to clinic for continued follow up.     Had myomectomy March 11, 2024. She continues to have some heavy bleeding but states is has improved.  She continues to feel fatigued.         LABS    Lab Results - Last 18 Months   Lab Units 05/31/24  0903 02/29/24  0914 01/11/24  0926 11/30/23  0904 08/31/23  0916 05/31/23  0849 04/19/23  0840   HEMOGLOBIN g/dL 11.6* 13.1 12.8  "12.9 13.1 12.7 12.3   HEMATOCRIT % 38.5 41.5 41.6 42.6 43.8 42.0 42.9   MCV fL 75.6* 81.5 82.7 83.9 82.5 76.9* 74.4*   WBC 10*3/mm3 4.38 4.20 3.40 3.44 3.87 3.12* 4.41   RDW % 13.8 12.5 13.0 12.6 14.0 17.0* 22.8*   MPV fL 9.3 8.8 8.9 8.8 9.1 9.4 9.2   PLATELETS 10*3/mm3 336 308 261 284 317 348 407   IMM GRAN % % 0.2 0.2  --   --  0.0 0.0  --    NEUTROS ABS 10*3/mm3 1.86 1.63* 1.70 1.51* 1.58* 1.15* 1.62*   LYMPHS ABS 10*3/mm3 1.94 2.04  --   --  1.83 1.61  --    MONOS ABS 10*3/mm3 0.41 0.33  --   --  0.30 0.23  --    EOS ABS 10*3/mm3 0.11 0.14 0.07 0.07 0.11 0.08 0.04   BASOS ABS 10*3/mm3 0.05 0.05  --  0.07 0.05 0.05  --    IMMATURE GRANS (ABS) 10*3/mm3 0.01 0.01  --   --  0.00 0.00  --    NRBC /100 WBC 0.0 0.0  --   --  0.0 0.0  --    NEUTROPHIL % %  --   --  49.0 43.9  --   --  36.7*   MONOCYTES % %  --   --  7.0  --   --   --  5.1   BASOPHIL % %  --   --   --  2.0*  --   --   --    ATYP LYMPH % %  --   --  7.0* 9.2*  --   --  4.1   ANISOCYTOSIS   --   --   --   --   --   --  Slight/1+   GIANT PLT   --   --  Slight/1+  --   --   --  Slight/1+       Lab Results - Last 18 Months   Lab Units 05/31/24  0903 02/29/24  0914 01/11/24  0926 11/30/23  0904 08/31/23  0916 05/31/23  0849   GLUCOSE mg/dL 77 84 86 93 75 87   SODIUM mmol/L 138 137 138 138 138 139   POTASSIUM mmol/L 4.2 4.3 3.8 3.6 4.2 3.8   CO2 mmol/L 24.0 27.0 26.0 29.0 26.0 25.0   CHLORIDE mmol/L 103 102 105 102 103 106   ANION GAP mmol/L 11.0 8.0 7.0 7.0 9.0 8.0   CREATININE mg/dL 0.58 0.69 0.64 0.62 0.65 0.61   BUN mg/dL 5* 8 6 8 8 5*   BUN / CREAT RATIO  8.6 11.6 9.4 12.9 12.3 8.2   CALCIUM mg/dL 8.5* 8.4* 8.4* 8.5* 8.4* 8.8   ALK PHOS U/L 67 62 66 63 69 66   TOTAL PROTEIN g/dL 7.0 7.0 7.2 7.0 7.3 7.4   ALT (SGPT) U/L <5 6 6 5 6 6   AST (SGOT) U/L 10 11 11 10 12 11   BILIRUBIN mg/dL 0.5 0.4 0.3 0.9 0.7 0.6   ALBUMIN g/dL 4.0 3.9 3.9 3.9 4.3 4.1   GLOBULIN gm/dL 3.0 3.1 3.3 3.1 3.0 3.3       No results for input(s): \"MSPIKE\", \"KAPPALAMB\", \"IGLFLC\", " "\"URICACID\", \"FREEKAPPAL\", \"CEA\", \"LDH\", \"REFLABREPO\" in the last 34132 hours.    Lab Results - Last 18 Months   Lab Units 05/31/24  0903 02/29/24  0914 01/11/24  0926 11/30/23  0904 08/31/23  0916 05/31/23  0849   IRON mcg/dL 42 41 35* 63 234* 214*   TIBC mcg/dL 446 396 349 353 389 446   IRON SATURATION (TSAT) % 9* 10* 10* 18* 60* 48   FERRITIN ng/mL 8.63* 38.83 41.01 48.08 29.06 9.64*         PAST MEDICAL HISTORY:  ALLERGIES:  Allergies   Allergen Reactions   • Latex Itching   • Naproxen GI Intolerance     Stomach ulcers     CURRENT MEDICATIONS:  Outpatient Encounter Medications as of 5/31/2024   Medication Sig Dispense Refill   • busPIRone (BUSPAR) 5 MG tablet Take 1 tablet by mouth 3 (Three) Times a Day. Pt only takes bid     • citalopram (CeleXA) 20 MG tablet      • [employer] ASHWAGANDHA PO Take  by mouth. Gummies     • docusate sodium 100 MG capsule Take 1 capsule by mouth As Needed (constipation).     • FREESTYLE LITE test strip      • Lancets (freestyle) lancets      • Magnesium 250 MG tablet Take  by mouth.     • Mounjaro 5 MG/0.5ML solution pen-injector Inject 0.5 mL under the skin into the appropriate area as directed 1 (One) Time Per Week.     • ondansetron (ZOFRAN) 8 MG tablet Take 1 tablet by mouth Every 8 (Eight) Hours As Needed for Nausea.       No facility-administered encounter medications on file as of 5/31/2024.     ADULT ILLNESSES:  Patient Active Problem List   Diagnosis Code   • Cervical lymphadenopathy R59.0   • Excessive and frequent menstruation N92.0   • Iron deficiency anemia D50.9   • Menorrhagia with regular cycle N92.0       HEALTH MAINTENANCE ITEMS:  Health Maintenance Due   Topic Date Due   • URINE MICROALBUMIN  Never done   • BMI FOLLOWUP  Never done   • Pneumococcal Vaccine 0-64 (1 of 2 - PCV) Never done   • DIABETIC EYE EXAM  Never done   • HPV VACCINES (1 - 3-dose series) Never done   • Hepatitis B (1 of 3 - 19+ 3-dose series) Never done   • TDAP/TD VACCINES (1 - Tdap) Never done   • " "HEPATITIS C SCREENING  Never done   • ANNUAL PHYSICAL  Never done   • DIABETIC FOOT EXAM  Never done   • PAP SMEAR  Never done   • HEMOGLOBIN A1C  Never done   • COVID-19 Vaccine (3 - 2023-24 season) 09/01/2023       <no information>  Last Completed Colonoscopy       This patient has no relevant Health Maintenance data.          Immunization History   Administered Date(s) Administered   • COVID-19 (MODERNA) 1st,2nd,3rd Dose Monovalent 07/02/2021, 07/30/2021     Last Completed Mammogram       This patient has no relevant Health Maintenance data.              FAMILY HISTORY:  Family History   Problem Relation Age of Onset   • Uterine cancer Mother 29   • Breast cancer Paternal Grandmother    • Breast cancer Maternal Aunt      SOCIAL HISTORY:  Social History     Socioeconomic History   • Marital status: Single   Tobacco Use   • Smoking status: Never   • Smokeless tobacco: Never   Vaping Use   • Vaping status: Never Used   Substance and Sexual Activity   • Alcohol use: Not Currently   • Drug use: Never   • Sexual activity: Never     Birth control/protection: None       REVIEW OF SYSTEMS:  Review of Systems   Constitutional:  Negative for fatigue and fever.        \"I feel alright\"   HENT:  Negative for trouble swallowing.    Respiratory:  Negative for cough and shortness of breath.    Cardiovascular:  Negative for chest pain, palpitations and leg swelling.   Gastrointestinal:  Negative for nausea and vomiting.   Endocrine: Positive for polyuria.   Genitourinary:  Positive for menstrual problem (Menorrhagia. ). Negative for hematuria.        Fibroids   Musculoskeletal:  Negative for arthralgias and myalgias.   Skin:  Negative for rash, skin lesions and wound.   Neurological:  Positive for headache. Negative for dizziness, syncope, memory problem and confusion.   Psychiatric/Behavioral:  Positive for depressed mood (Celexa recently inrceased). Negative for suicidal ideas. The patient is nervous/anxious.      I have " "reviewed the ROS and verified with the patient the accuracy of it. No changes since the information was documented.  Cecy Jin, APRN 05/31/2024       /72   Pulse 114   Temp 97.8 °F (36.6 °C)   Resp 16   Ht 167.6 cm (66\")   Wt 102 kg (224 lb 4.8 oz)   SpO2 98%   BMI 36.20 kg/m²  Body surface area is 2.1 meters squared.    Pain Score    05/31/24 0911   PainSc: 0-No pain       Physical Exam  Constitutional:       Appearance: Normal appearance.   HENT:      Head: Normocephalic and atraumatic.   Cardiovascular:      Rate and Rhythm: Normal rate and regular rhythm.   Pulmonary:      Effort: Pulmonary effort is normal.      Breath sounds: Normal breath sounds.   Abdominal:      General: Bowel sounds are normal.      Palpations: Abdomen is soft.   Musculoskeletal:      Right lower leg: No edema.      Left lower leg: No edema.   Skin:     General: Skin is warm and dry.   Neurological:      Mental Status: She is alert and oriented to person, place, and time.   Psychiatric:         Attention and Perception: Attention normal.         Mood and Affect: Mood normal.         Judgment: Judgment normal.         Tim Saenz reports a pain score of 0.  Given her pain assessment as noted, treatment options were discussed and the following options were decided upon as a follow-up plan to address the patient's pain:  No intervention indicated. .      ASSESSMENT / PLAN:       Recent Results (from the past 168 hour(s))   Comprehensive Metabolic Panel    Collection Time: 05/31/24  9:03 AM    Specimen: Blood   Result Value Ref Range    Glucose 77 65 - 99 mg/dL    BUN 5 (L) 6 - 20 mg/dL    Creatinine 0.58 0.57 - 1.00 mg/dL    Sodium 138 136 - 145 mmol/L    Potassium 4.2 3.5 - 5.2 mmol/L    Chloride 103 98 - 107 mmol/L    CO2 24.0 22.0 - 29.0 mmol/L    Calcium 8.5 (L) 8.6 - 10.5 mg/dL    Total Protein 7.0 6.0 - 8.5 g/dL    Albumin 4.0 3.5 - 5.2 g/dL    ALT (SGPT) <5 1 - 33 U/L    AST (SGOT) 10 1 - 32 U/L    Alkaline " Phosphatase 67 39 - 117 U/L    Total Bilirubin 0.5 0.0 - 1.2 mg/dL    Globulin 3.0 gm/dL    A/G Ratio 1.3 g/dL    BUN/Creatinine Ratio 8.6 7.0 - 25.0    Anion Gap 11.0 5.0 - 15.0 mmol/L    eGFR 128.2 >60.0 mL/min/1.73   Iron Profile    Collection Time: 05/31/24  9:03 AM    Specimen: Blood   Result Value Ref Range    Iron 42 37 - 145 mcg/dL    Iron Saturation (TSAT) 9 (L) 20 - 50 %    Transferrin 299 200 - 360 mg/dL    TIBC 446 298 - 536 mcg/dL   Ferritin    Collection Time: 05/31/24  9:03 AM    Specimen: Blood   Result Value Ref Range    Ferritin 8.63 (L) 13.00 - 150.00 ng/mL   CBC Auto Differential    Collection Time: 05/31/24  9:03 AM    Specimen: Blood   Result Value Ref Range    WBC 4.38 3.40 - 10.80 10*3/mm3    RBC 5.09 3.77 - 5.28 10*6/mm3    Hemoglobin 11.6 (L) 12.0 - 15.9 g/dL    Hematocrit 38.5 34.0 - 46.6 %    MCV 75.6 (L) 79.0 - 97.0 fL    MCH 22.8 (L) 26.6 - 33.0 pg    MCHC 30.1 (L) 31.5 - 35.7 g/dL    RDW 13.8 12.3 - 15.4 %    RDW-SD 38.1 37.0 - 54.0 fl    MPV 9.3 6.0 - 12.0 fL    Platelets 336 140 - 450 10*3/mm3    Neutrophil % 42.5 (L) 42.7 - 76.0 %    Lymphocyte % 44.3 19.6 - 45.3 %    Monocyte % 9.4 5.0 - 12.0 %    Eosinophil % 2.5 0.3 - 6.2 %    Basophil % 1.1 0.0 - 1.5 %    Immature Grans % 0.2 0.0 - 0.5 %    Neutrophils, Absolute 1.86 1.70 - 7.00 10*3/mm3    Lymphocytes, Absolute 1.94 0.70 - 3.10 10*3/mm3    Monocytes, Absolute 0.41 0.10 - 0.90 10*3/mm3    Eosinophils, Absolute 0.11 0.00 - 0.40 10*3/mm3    Basophils, Absolute 0.05 0.00 - 0.20 10*3/mm3    Immature Grans, Absolute 0.01 0.00 - 0.05 10*3/mm3    nRBC 0.0 0.0 - 0.2 /100 WBC   Gold Top - SST    Collection Time: 05/31/24  9:03 AM   Result Value Ref Range    Extra Tube Hold for add-ons.      1. Iron deficiency anemia, unspecified iron deficiency anemia type    2. Menorrhagia with irregular cycle          1.  Iron Deficiency   -Pt is taking oral iron once daily  -Received Venofer Feb 16, 29 2024    Will order Venofer 200 mg x 4   Recheck  labs in 6 weeks at [facility].  Orders given.  RTC in 3 months with labs one week before office visit for CBC, CMP, Iron profile, Ferritin     2.  Menorrhagia  -Contributing to iron deficiency  -Had March 11, 2024 for laparoscopic myomectomy in [place] Dr. Gilbert Marino  -Bleeding has improved       Cecy Jin, APRN  05/31/2024

## 2024-05-31 NOTE — PROGRESS NOTES
[facility] HEMATOLOGY & ONCOLOGY [place]  [address]  [facility] [address] [phone]    Patient Name: Tim Saenz  Encounter Date: 05/31/2024   YOB: 1995  Patient Number: [id number]    PROGRESS NOTE    HISTORY OF PRESENT ILLNESS: Tim Saenz is a 28 y.o. female followed by this office for iron deficiency anemia. History is obtained from patient. History is considered to be accurate.    She has health history significant for DM and iron deficiency, depression/anxiety. She also reports frequent menstruation r/t fibroids which is managed by GYN.  She is taking oral iron once daily.     Scheduled for myomectomy March 11, 2024.    She had Venofer 200 mg on Feb 16, & 29 2024.  She reports some muscle pain after her first infusion.       I have reviewed the HPI and verified with the patient the accuracy of it. No changes to history since the information was documented.  Cecy Jin, APRN.  05/31/2024     INTERVAL HISTORY     Pt presents to clinic for continued follow up.     Had myomectomy March 11, 2024. She continues to have some heavy bleeding but states is has improved.  She continues to feel fatigued.         LABS    Lab Results - Last 18 Months   Lab Units 05/31/24  0903 02/29/24  0914 01/11/24  0926 11/30/23  0904 08/31/23  0916 05/31/23  0849 04/19/23  0840   HEMOGLOBIN g/dL 11.6* 13.1 12.8 12.9 13.1 12.7 12.3   HEMATOCRIT % 38.5 41.5 41.6 42.6 43.8 42.0 42.9   MCV fL 75.6* 81.5 82.7 83.9 82.5 76.9* 74.4*   WBC 10*3/mm3 4.38 4.20 3.40 3.44 3.87 3.12* 4.41   RDW % 13.8 12.5 13.0 12.6 14.0 17.0* 22.8*   MPV fL 9.3 8.8 8.9 8.8 9.1 9.4 9.2   PLATELETS 10*3/mm3 336 308 261 284 317 348 407   IMM GRAN % % 0.2 0.2  --   --  0.0 0.0  --    NEUTROS ABS 10*3/mm3 1.86 1.63* 1.70 1.51* 1.58* 1.15* 1.62*   LYMPHS ABS 10*3/mm3 1.94 2.04  --   --  1.83 1.61  --    MONOS ABS 10*3/mm3 0.41 0.33  --   --  0.30 0.23  --    EOS ABS 10*3/mm3  "0.11 0.14 0.07 0.07 0.11 0.08 0.04   BASOS ABS 10*3/mm3 0.05 0.05  --  0.07 0.05 0.05  --    IMMATURE GRANS (ABS) 10*3/mm3 0.01 0.01  --   --  0.00 0.00  --    NRBC /100 WBC 0.0 0.0  --   --  0.0 0.0  --    NEUTROPHIL % %  --   --  49.0 43.9  --   --  36.7*   MONOCYTES % %  --   --  7.0  --   --   --  5.1   BASOPHIL % %  --   --   --  2.0*  --   --   --    ATYP LYMPH % %  --   --  7.0* 9.2*  --   --  4.1   ANISOCYTOSIS   --   --   --   --   --   --  Slight/1+   GIANT PLT   --   --  Slight/1+  --   --   --  Slight/1+       Lab Results - Last 18 Months   Lab Units 05/31/24  0903 02/29/24  0914 01/11/24  0926 11/30/23  0904 08/31/23  0916 05/31/23  0849   GLUCOSE mg/dL 77 84 86 93 75 87   SODIUM mmol/L 138 137 138 138 138 139   POTASSIUM mmol/L 4.2 4.3 3.8 3.6 4.2 3.8   CO2 mmol/L 24.0 27.0 26.0 29.0 26.0 25.0   CHLORIDE mmol/L 103 102 105 102 103 106   ANION GAP mmol/L 11.0 8.0 7.0 7.0 9.0 8.0   CREATININE mg/dL 0.58 0.69 0.64 0.62 0.65 0.61   BUN mg/dL 5* 8 6 8 8 5*   BUN / CREAT RATIO  8.6 11.6 9.4 12.9 12.3 8.2   CALCIUM mg/dL 8.5* 8.4* 8.4* 8.5* 8.4* 8.8   ALK PHOS U/L 67 62 66 63 69 66   TOTAL PROTEIN g/dL 7.0 7.0 7.2 7.0 7.3 7.4   ALT (SGPT) U/L <5 6 6 5 6 6   AST (SGOT) U/L 10 11 11 10 12 11   BILIRUBIN mg/dL 0.5 0.4 0.3 0.9 0.7 0.6   ALBUMIN g/dL 4.0 3.9 3.9 3.9 4.3 4.1   GLOBULIN gm/dL 3.0 3.1 3.3 3.1 3.0 3.3       No results for input(s): \"MSPIKE\", \"KAPPALAMB\", \"IGLFLC\", \"URICACID\", \"FREEKAPPAL\", \"CEA\", \"LDH\", \"REFLABREPO\" in the last 76201 hours.    Lab Results - Last 18 Months   Lab Units 05/31/24  0903 02/29/24  0914 01/11/24  0926 11/30/23  0904 08/31/23  0916 05/31/23  0849   IRON mcg/dL 42 41 35* 63 234* 214*   TIBC mcg/dL 446 396 349 353 389 446   IRON SATURATION (TSAT) % 9* 10* 10* 18* 60* 48   FERRITIN ng/mL 8.63* 38.83 41.01 48.08 29.06 9.64*         PAST MEDICAL HISTORY:  ALLERGIES:  Allergies   Allergen Reactions    Latex Itching    Naproxen GI Intolerance     Stomach ulcers     CURRENT " MEDICATIONS:  Outpatient Encounter Medications as of 5/31/2024   Medication Sig Dispense Refill    busPIRone (BUSPAR) 5 MG tablet Take 1 tablet by mouth 3 (Three) Times a Day. Pt only takes bid      citalopram (CeleXA) 20 MG tablet       [employer] ASHWAGANDHA PO Take  by mouth. Gummies      docusate sodium 100 MG capsule Take 1 capsule by mouth As Needed (constipation).      FREESTYLE LITE test strip       Lancets (freestyle) lancets       Magnesium 250 MG tablet Take  by mouth.      Mounjaro 5 MG/0.5ML solution pen-injector Inject 0.5 mL under the skin into the appropriate area as directed 1 (One) Time Per Week.      ondansetron (ZOFRAN) 8 MG tablet Take 1 tablet by mouth Every 8 (Eight) Hours As Needed for Nausea.       No facility-administered encounter medications on file as of 5/31/2024.     ADULT ILLNESSES:  Patient Active Problem List   Diagnosis Code    Cervical lymphadenopathy R59.0    Excessive and frequent menstruation N92.0    Iron deficiency anemia D50.9    Menorrhagia with regular cycle N92.0       HEALTH MAINTENANCE ITEMS:  Health Maintenance Due   Topic Date Due    URINE MICROALBUMIN  Never done    BMI FOLLOWUP  Never done    Pneumococcal Vaccine 0-64 (1 of 2 - PCV) Never done    DIABETIC EYE EXAM  Never done    HPV VACCINES (1 - 3-dose series) Never done    Hepatitis B (1 of 3 - 19+ 3-dose series) Never done    TDAP/TD VACCINES (1 - Tdap) Never done    HEPATITIS C SCREENING  Never done    ANNUAL PHYSICAL  Never done    DIABETIC FOOT EXAM  Never done    PAP SMEAR  Never done    HEMOGLOBIN A1C  Never done    COVID-19 Vaccine (3 - 2023-24 season) 09/01/2023       <no information>  Last Completed Colonoscopy       This patient has no relevant Health Maintenance data.          Immunization History   Administered Date(s) Administered    COVID-19 (MODERNA) 1st,2nd,3rd Dose Monovalent 07/02/2021, 07/30/2021     Last Completed Mammogram       This patient has no relevant Health Maintenance data.       "        FAMILY HISTORY:  Family History   Problem Relation Age of Onset    Uterine cancer Mother 29    Breast cancer Paternal Grandmother     Breast cancer Maternal Aunt      SOCIAL HISTORY:  Social History     Socioeconomic History    Marital status: Single   Tobacco Use    Smoking status: Never    Smokeless tobacco: Never   Vaping Use    Vaping status: Never Used   Substance and Sexual Activity    Alcohol use: Not Currently    Drug use: Never    Sexual activity: Never     Birth control/protection: None       REVIEW OF SYSTEMS:  Review of Systems   Constitutional:  Negative for fatigue and fever.        \"I feel alright\"   HENT:  Negative for trouble swallowing.    Respiratory:  Negative for cough and shortness of breath.    Cardiovascular:  Negative for chest pain, palpitations and leg swelling.   Gastrointestinal:  Negative for nausea and vomiting.   Endocrine: Positive for polyuria.   Genitourinary:  Positive for menstrual problem (Menorrhagia. ). Negative for hematuria.        Fibroids   Musculoskeletal:  Negative for arthralgias and myalgias.   Skin:  Negative for rash, skin lesions and wound.   Neurological:  Positive for headache. Negative for dizziness, syncope, memory problem and confusion.   Psychiatric/Behavioral:  Positive for depressed mood (Celexa recently inrceased). Negative for suicidal ideas. The patient is nervous/anxious.      I have reviewed the ROS and verified with the patient the accuracy of it. No changes since the information was documented.  Cecy BARRERA Daivn, APRN 05/31/2024       /72   Pulse 114   Temp 97.8 °F (36.6 °C)   Resp 16   Ht 167.6 cm (66\")   Wt 102 kg (224 lb 4.8 oz)   SpO2 98%   BMI 36.20 kg/m²  Body surface area is 2.1 meters squared.    Pain Score    05/31/24 0911   PainSc: 0-No pain       Physical Exam  Constitutional:       Appearance: Normal appearance.   HENT:      Head: Normocephalic and atraumatic.   Cardiovascular:      Rate and Rhythm: Normal rate and " regular rhythm.   Pulmonary:      Effort: Pulmonary effort is normal.      Breath sounds: Normal breath sounds.   Abdominal:      General: Bowel sounds are normal.      Palpations: Abdomen is soft.   Musculoskeletal:      Right lower leg: No edema.      Left lower leg: No edema.   Skin:     General: Skin is warm and dry.   Neurological:      Mental Status: She is alert and oriented to person, place, and time.   Psychiatric:         Attention and Perception: Attention normal.         Mood and Affect: Mood normal.         Judgment: Judgment normal.         Tim Saenz reports a pain score of 0.  Given her pain assessment as noted, treatment options were discussed and the following options were decided upon as a follow-up plan to address the patient's pain:  No intervention indicated. .      ASSESSMENT / PLAN:       Recent Results (from the past 168 hour(s))   Comprehensive Metabolic Panel    Collection Time: 05/31/24  9:03 AM    Specimen: Blood   Result Value Ref Range    Glucose 77 65 - 99 mg/dL    BUN 5 (L) 6 - 20 mg/dL    Creatinine 0.58 0.57 - 1.00 mg/dL    Sodium 138 136 - 145 mmol/L    Potassium 4.2 3.5 - 5.2 mmol/L    Chloride 103 98 - 107 mmol/L    CO2 24.0 22.0 - 29.0 mmol/L    Calcium 8.5 (L) 8.6 - 10.5 mg/dL    Total Protein 7.0 6.0 - 8.5 g/dL    Albumin 4.0 3.5 - 5.2 g/dL    ALT (SGPT) <5 1 - 33 U/L    AST (SGOT) 10 1 - 32 U/L    Alkaline Phosphatase 67 39 - 117 U/L    Total Bilirubin 0.5 0.0 - 1.2 mg/dL    Globulin 3.0 gm/dL    A/G Ratio 1.3 g/dL    BUN/Creatinine Ratio 8.6 7.0 - 25.0    Anion Gap 11.0 5.0 - 15.0 mmol/L    eGFR 128.2 >60.0 mL/min/1.73   Iron Profile    Collection Time: 05/31/24  9:03 AM    Specimen: Blood   Result Value Ref Range    Iron 42 37 - 145 mcg/dL    Iron Saturation (TSAT) 9 (L) 20 - 50 %    Transferrin 299 200 - 360 mg/dL    TIBC 446 298 - 536 mcg/dL   Ferritin    Collection Time: 05/31/24  9:03 AM    Specimen: Blood   Result Value Ref Range    Ferritin 8.63 (L) 13.00 - 150.00  ng/mL   CBC Auto Differential    Collection Time: 05/31/24  9:03 AM    Specimen: Blood   Result Value Ref Range    WBC 4.38 3.40 - 10.80 10*3/mm3    RBC 5.09 3.77 - 5.28 10*6/mm3    Hemoglobin 11.6 (L) 12.0 - 15.9 g/dL    Hematocrit 38.5 34.0 - 46.6 %    MCV 75.6 (L) 79.0 - 97.0 fL    MCH 22.8 (L) 26.6 - 33.0 pg    MCHC 30.1 (L) 31.5 - 35.7 g/dL    RDW 13.8 12.3 - 15.4 %    RDW-SD 38.1 37.0 - 54.0 fl    MPV 9.3 6.0 - 12.0 fL    Platelets 336 140 - 450 10*3/mm3    Neutrophil % 42.5 (L) 42.7 - 76.0 %    Lymphocyte % 44.3 19.6 - 45.3 %    Monocyte % 9.4 5.0 - 12.0 %    Eosinophil % 2.5 0.3 - 6.2 %    Basophil % 1.1 0.0 - 1.5 %    Immature Grans % 0.2 0.0 - 0.5 %    Neutrophils, Absolute 1.86 1.70 - 7.00 10*3/mm3    Lymphocytes, Absolute 1.94 0.70 - 3.10 10*3/mm3    Monocytes, Absolute 0.41 0.10 - 0.90 10*3/mm3    Eosinophils, Absolute 0.11 0.00 - 0.40 10*3/mm3    Basophils, Absolute 0.05 0.00 - 0.20 10*3/mm3    Immature Grans, Absolute 0.01 0.00 - 0.05 10*3/mm3    nRBC 0.0 0.0 - 0.2 /100 WBC   Gold Top - SST    Collection Time: 05/31/24  9:03 AM   Result Value Ref Range    Extra Tube Hold for add-ons.      1. Iron deficiency anemia, unspecified iron deficiency anemia type    2. Menorrhagia with irregular cycle          1.  Iron Deficiency   -Pt is taking oral iron once daily  -Received Venofer Feb 16, 29 2024    Will order Venofer 200 mg x 4   Recheck labs in 6 weeks at [place].  Orders given.  RTC in 3 months with labs one week before office visit for CBC, CMP, Iron profile, Ferritin     2.  Menorrhagia  -Contributing to iron deficiency  -Had March 11, 2024 for laparoscopic myomectomy in [place] Dr. Gilbert Marino  -Bleeding has improved       Cecy Jin, APRN  05/31/2024

## 2024-06-03 RX ORDER — ACETAMINOPHEN 325 MG/1
650 TABLET ORAL ONCE
OUTPATIENT
Start: 2024-06-24

## 2024-06-03 RX ORDER — DIPHENHYDRAMINE HYDROCHLORIDE 50 MG/ML
50 INJECTION INTRAMUSCULAR; INTRAVENOUS AS NEEDED
OUTPATIENT
Start: 2024-07-01

## 2024-06-03 RX ORDER — ACETAMINOPHEN 325 MG/1
650 TABLET ORAL ONCE
OUTPATIENT
Start: 2024-06-17

## 2024-06-03 RX ORDER — FAMOTIDINE 10 MG/ML
20 INJECTION, SOLUTION INTRAVENOUS AS NEEDED
OUTPATIENT
Start: 2024-07-01

## 2024-06-03 RX ORDER — ACETAMINOPHEN 325 MG/1
650 TABLET ORAL ONCE
OUTPATIENT
Start: 2024-07-01

## 2024-06-03 RX ORDER — SODIUM CHLORIDE 9 MG/ML
20 INJECTION, SOLUTION INTRAVENOUS ONCE
OUTPATIENT
Start: 2024-06-10

## 2024-06-03 RX ORDER — FAMOTIDINE 10 MG/ML
20 INJECTION, SOLUTION INTRAVENOUS AS NEEDED
OUTPATIENT
Start: 2024-06-24

## 2024-06-03 RX ORDER — FAMOTIDINE 10 MG/ML
20 INJECTION, SOLUTION INTRAVENOUS AS NEEDED
OUTPATIENT
Start: 2024-06-17

## 2024-06-03 RX ORDER — SODIUM CHLORIDE 9 MG/ML
20 INJECTION, SOLUTION INTRAVENOUS ONCE
OUTPATIENT
Start: 2024-07-01

## 2024-06-03 RX ORDER — DIPHENHYDRAMINE HCL 25 MG
25 CAPSULE ORAL ONCE
OUTPATIENT
Start: 2024-06-24

## 2024-06-03 RX ORDER — DIPHENHYDRAMINE HYDROCHLORIDE 50 MG/ML
50 INJECTION INTRAMUSCULAR; INTRAVENOUS AS NEEDED
OUTPATIENT
Start: 2024-06-17

## 2024-06-03 RX ORDER — SODIUM CHLORIDE 9 MG/ML
20 INJECTION, SOLUTION INTRAVENOUS ONCE
OUTPATIENT
Start: 2024-06-24

## 2024-06-03 RX ORDER — DIPHENHYDRAMINE HCL 25 MG
25 CAPSULE ORAL ONCE
OUTPATIENT
Start: 2024-07-01

## 2024-06-03 RX ORDER — DIPHENHYDRAMINE HYDROCHLORIDE 50 MG/ML
50 INJECTION INTRAMUSCULAR; INTRAVENOUS AS NEEDED
OUTPATIENT
Start: 2024-06-24

## 2024-06-03 RX ORDER — DIPHENHYDRAMINE HCL 25 MG
25 CAPSULE ORAL ONCE
OUTPATIENT
Start: 2024-06-10

## 2024-06-03 RX ORDER — DIPHENHYDRAMINE HCL 25 MG
25 CAPSULE ORAL ONCE
OUTPATIENT
Start: 2024-06-17

## 2024-06-03 RX ORDER — ACETAMINOPHEN 325 MG/1
650 TABLET ORAL ONCE
OUTPATIENT
Start: 2024-06-10

## 2024-06-03 RX ORDER — FAMOTIDINE 10 MG/ML
20 INJECTION, SOLUTION INTRAVENOUS AS NEEDED
OUTPATIENT
Start: 2024-06-10

## 2024-06-03 RX ORDER — DIPHENHYDRAMINE HYDROCHLORIDE 50 MG/ML
50 INJECTION INTRAMUSCULAR; INTRAVENOUS AS NEEDED
OUTPATIENT
Start: 2024-06-10

## 2024-06-03 RX ORDER — SODIUM CHLORIDE 9 MG/ML
20 INJECTION, SOLUTION INTRAVENOUS ONCE
OUTPATIENT
Start: 2024-06-17

## 2024-06-14 ENCOUNTER — INFUSION (OUTPATIENT)
Dept: ONCOLOGY | Facility: HOSPITAL | Age: 29
End: 2024-06-14
Payer: COMMERCIAL

## 2024-06-14 VITALS
RESPIRATION RATE: 18 BRPM | HEART RATE: 80 BPM | DIASTOLIC BLOOD PRESSURE: 73 MMHG | BODY MASS INDEX: 36.48 KG/M2 | WEIGHT: 227 LBS | SYSTOLIC BLOOD PRESSURE: 116 MMHG | HEIGHT: 66 IN | OXYGEN SATURATION: 94 % | TEMPERATURE: 97.1 F

## 2024-06-14 DIAGNOSIS — D50.9 IRON DEFICIENCY ANEMIA, UNSPECIFIED IRON DEFICIENCY ANEMIA TYPE: Primary | ICD-10-CM

## 2024-06-14 PROCEDURE — 25010000002 IRON SUCROSE PER 1 MG: Performed by: NURSE PRACTITIONER

## 2024-06-14 PROCEDURE — 63710000001 DIPHENHYDRAMINE PER 50 MG: Performed by: NURSE PRACTITIONER

## 2024-06-14 PROCEDURE — 25810000003 SODIUM CHLORIDE 0.9 % SOLUTION: Performed by: NURSE PRACTITIONER

## 2024-06-14 PROCEDURE — 96365 THER/PROPH/DIAG IV INF INIT: CPT

## 2024-06-14 RX ORDER — FAMOTIDINE 10 MG/ML
20 INJECTION, SOLUTION INTRAVENOUS AS NEEDED
Status: DISCONTINUED | OUTPATIENT
Start: 2024-06-14 | End: 2024-06-14 | Stop reason: HOSPADM

## 2024-06-14 RX ORDER — DIPHENHYDRAMINE HYDROCHLORIDE 50 MG/ML
50 INJECTION INTRAMUSCULAR; INTRAVENOUS AS NEEDED
Status: DISCONTINUED | OUTPATIENT
Start: 2024-06-14 | End: 2024-06-14 | Stop reason: HOSPADM

## 2024-06-14 RX ORDER — SODIUM CHLORIDE 9 MG/ML
20 INJECTION, SOLUTION INTRAVENOUS ONCE
Status: COMPLETED | OUTPATIENT
Start: 2024-06-14 | End: 2024-06-14

## 2024-06-14 RX ORDER — ACETAMINOPHEN 325 MG/1
650 TABLET ORAL ONCE
Status: COMPLETED | OUTPATIENT
Start: 2024-06-14 | End: 2024-06-14

## 2024-06-14 RX ORDER — DIPHENHYDRAMINE HCL 25 MG
25 CAPSULE ORAL ONCE
Status: COMPLETED | OUTPATIENT
Start: 2024-06-14 | End: 2024-06-14

## 2024-06-14 RX ADMIN — IRON SUCROSE 200 MG: 20 INJECTION, SOLUTION INTRAVENOUS at 15:37

## 2024-06-14 RX ADMIN — SODIUM CHLORIDE 20 ML/HR: 9 INJECTION, SOLUTION INTRAVENOUS at 15:15

## 2024-06-14 RX ADMIN — ACETAMINOPHEN 650 MG: 325 TABLET, FILM COATED ORAL at 15:26

## 2024-06-14 RX ADMIN — DIPHENHYDRAMINE HYDROCHLORIDE 25 MG: 25 CAPSULE ORAL at 15:26

## 2024-06-21 ENCOUNTER — INFUSION (OUTPATIENT)
Dept: ONCOLOGY | Facility: HOSPITAL | Age: 29
End: 2024-06-21
Payer: COMMERCIAL

## 2024-06-21 VITALS
TEMPERATURE: 98 F | HEART RATE: 90 BPM | SYSTOLIC BLOOD PRESSURE: 116 MMHG | HEIGHT: 66 IN | RESPIRATION RATE: 16 BRPM | WEIGHT: 229.2 LBS | OXYGEN SATURATION: 99 % | DIASTOLIC BLOOD PRESSURE: 62 MMHG | BODY MASS INDEX: 36.83 KG/M2

## 2024-06-21 DIAGNOSIS — D50.9 IRON DEFICIENCY ANEMIA, UNSPECIFIED IRON DEFICIENCY ANEMIA TYPE: Primary | ICD-10-CM

## 2024-06-21 PROCEDURE — 25810000003 SODIUM CHLORIDE 0.9 % SOLUTION: Performed by: NURSE PRACTITIONER

## 2024-06-21 PROCEDURE — 96365 THER/PROPH/DIAG IV INF INIT: CPT

## 2024-06-21 PROCEDURE — 25010000002 IRON SUCROSE PER 1 MG: Performed by: NURSE PRACTITIONER

## 2024-06-21 RX ORDER — DIPHENHYDRAMINE HYDROCHLORIDE 50 MG/ML
50 INJECTION INTRAMUSCULAR; INTRAVENOUS AS NEEDED
Status: DISCONTINUED | OUTPATIENT
Start: 2024-06-21 | End: 2024-06-21 | Stop reason: HOSPADM

## 2024-06-21 RX ORDER — SODIUM CHLORIDE 9 MG/ML
20 INJECTION, SOLUTION INTRAVENOUS ONCE
Status: COMPLETED | OUTPATIENT
Start: 2024-06-21 | End: 2024-06-21

## 2024-06-21 RX ORDER — ACETAMINOPHEN 325 MG/1
650 TABLET ORAL ONCE
Status: DISCONTINUED | OUTPATIENT
Start: 2024-06-21 | End: 2024-06-21 | Stop reason: HOSPADM

## 2024-06-21 RX ORDER — DIPHENHYDRAMINE HCL 25 MG
25 CAPSULE ORAL ONCE
Status: DISCONTINUED | OUTPATIENT
Start: 2024-06-21 | End: 2024-06-21 | Stop reason: HOSPADM

## 2024-06-21 RX ORDER — FAMOTIDINE 10 MG/ML
20 INJECTION, SOLUTION INTRAVENOUS AS NEEDED
Status: DISCONTINUED | OUTPATIENT
Start: 2024-06-21 | End: 2024-06-21 | Stop reason: HOSPADM

## 2024-06-21 RX ADMIN — SODIUM CHLORIDE 20 ML/HR: 9 INJECTION, SOLUTION INTRAVENOUS at 14:44

## 2024-06-21 RX ADMIN — IRON SUCROSE 200 MG: 20 INJECTION, SOLUTION INTRAVENOUS at 14:45

## 2024-06-28 ENCOUNTER — INFUSION (OUTPATIENT)
Dept: ONCOLOGY | Facility: HOSPITAL | Age: 29
End: 2024-06-28
Payer: COMMERCIAL

## 2024-06-28 VITALS
HEART RATE: 81 BPM | OXYGEN SATURATION: 100 % | SYSTOLIC BLOOD PRESSURE: 122 MMHG | TEMPERATURE: 97 F | DIASTOLIC BLOOD PRESSURE: 73 MMHG | RESPIRATION RATE: 16 BRPM

## 2024-06-28 DIAGNOSIS — D50.9 IRON DEFICIENCY ANEMIA, UNSPECIFIED IRON DEFICIENCY ANEMIA TYPE: Primary | ICD-10-CM

## 2024-06-28 PROCEDURE — 96365 THER/PROPH/DIAG IV INF INIT: CPT

## 2024-06-28 PROCEDURE — 25010000002 IRON SUCROSE PER 1 MG: Performed by: NURSE PRACTITIONER

## 2024-06-28 PROCEDURE — 25810000003 SODIUM CHLORIDE 0.9 % SOLUTION: Performed by: NURSE PRACTITIONER

## 2024-06-28 RX ORDER — SODIUM CHLORIDE 9 MG/ML
20 INJECTION, SOLUTION INTRAVENOUS ONCE
Status: COMPLETED | OUTPATIENT
Start: 2024-06-28 | End: 2024-06-28

## 2024-06-28 RX ORDER — FAMOTIDINE 10 MG/ML
20 INJECTION, SOLUTION INTRAVENOUS AS NEEDED
Status: DISCONTINUED | OUTPATIENT
Start: 2024-06-28 | End: 2024-06-28 | Stop reason: HOSPADM

## 2024-06-28 RX ORDER — DIPHENHYDRAMINE HYDROCHLORIDE 50 MG/ML
50 INJECTION INTRAMUSCULAR; INTRAVENOUS AS NEEDED
Status: DISCONTINUED | OUTPATIENT
Start: 2024-06-28 | End: 2024-06-28 | Stop reason: HOSPADM

## 2024-06-28 RX ADMIN — SODIUM CHLORIDE 20 ML/HR: 9 INJECTION, SOLUTION INTRAVENOUS at 14:20

## 2024-06-28 RX ADMIN — IRON SUCROSE 200 MG: 20 INJECTION, SOLUTION INTRAVENOUS at 14:29

## 2024-07-05 ENCOUNTER — INFUSION (OUTPATIENT)
Dept: ONCOLOGY | Facility: HOSPITAL | Age: 29
End: 2024-07-05
Payer: COMMERCIAL

## 2024-07-05 VITALS
HEIGHT: 66 IN | SYSTOLIC BLOOD PRESSURE: 122 MMHG | RESPIRATION RATE: 18 BRPM | DIASTOLIC BLOOD PRESSURE: 77 MMHG | TEMPERATURE: 97.6 F | BODY MASS INDEX: 38.51 KG/M2 | HEART RATE: 80 BPM | WEIGHT: 239.6 LBS | OXYGEN SATURATION: 100 %

## 2024-07-05 DIAGNOSIS — D50.9 IRON DEFICIENCY ANEMIA, UNSPECIFIED IRON DEFICIENCY ANEMIA TYPE: Primary | ICD-10-CM

## 2024-07-05 PROCEDURE — 96365 THER/PROPH/DIAG IV INF INIT: CPT

## 2024-07-05 PROCEDURE — 25010000002 IRON SUCROSE PER 1 MG: Performed by: NURSE PRACTITIONER

## 2024-07-05 PROCEDURE — 25810000003 SODIUM CHLORIDE 0.9 % SOLUTION: Performed by: NURSE PRACTITIONER

## 2024-07-05 RX ORDER — FAMOTIDINE 10 MG/ML
20 INJECTION, SOLUTION INTRAVENOUS AS NEEDED
Status: DISCONTINUED | OUTPATIENT
Start: 2024-07-05 | End: 2024-07-05 | Stop reason: HOSPADM

## 2024-07-05 RX ORDER — DIPHENHYDRAMINE HCL 25 MG
25 CAPSULE ORAL ONCE
Status: CANCELLED | OUTPATIENT
Start: 2024-10-04

## 2024-07-05 RX ORDER — SODIUM CHLORIDE 9 MG/ML
20 INJECTION, SOLUTION INTRAVENOUS ONCE
Status: COMPLETED | OUTPATIENT
Start: 2024-07-05 | End: 2024-07-05

## 2024-07-05 RX ORDER — ACETAMINOPHEN 325 MG/1
650 TABLET ORAL ONCE
Status: CANCELLED | OUTPATIENT
Start: 2024-10-04

## 2024-07-05 RX ORDER — DIPHENHYDRAMINE HYDROCHLORIDE 50 MG/ML
50 INJECTION INTRAMUSCULAR; INTRAVENOUS AS NEEDED
Status: DISCONTINUED | OUTPATIENT
Start: 2024-07-05 | End: 2024-07-05 | Stop reason: HOSPADM

## 2024-07-05 RX ADMIN — IRON SUCROSE 200 MG: 20 INJECTION, SOLUTION INTRAVENOUS at 14:54

## 2024-07-05 RX ADMIN — SODIUM CHLORIDE 20 ML/HR: 9 INJECTION, SOLUTION INTRAVENOUS at 14:53

## 2024-07-10 ENCOUNTER — OFFICE VISIT (OUTPATIENT)
Dept: OBSTETRICS AND GYNECOLOGY | Age: 29
End: 2024-07-10
Payer: COMMERCIAL

## 2024-07-10 VITALS
HEIGHT: 66 IN | BODY MASS INDEX: 37.12 KG/M2 | DIASTOLIC BLOOD PRESSURE: 70 MMHG | SYSTOLIC BLOOD PRESSURE: 102 MMHG | WEIGHT: 231 LBS

## 2024-07-10 DIAGNOSIS — F41.9 ANXIETY AND DEPRESSION: ICD-10-CM

## 2024-07-10 DIAGNOSIS — E11.9 TYPE 2 DIABETES MELLITUS WITHOUT COMPLICATION, WITHOUT LONG-TERM CURRENT USE OF INSULIN: ICD-10-CM

## 2024-07-10 DIAGNOSIS — N92.0 MENORRHAGIA WITH REGULAR CYCLE: ICD-10-CM

## 2024-07-10 DIAGNOSIS — Z01.411 ENCOUNTER FOR GYNECOLOGICAL EXAMINATION WITH ABNORMAL FINDING: Primary | ICD-10-CM

## 2024-07-10 DIAGNOSIS — F32.A ANXIETY AND DEPRESSION: ICD-10-CM

## 2024-07-10 DIAGNOSIS — D25.9 UTERINE LEIOMYOMA, UNSPECIFIED LOCATION: ICD-10-CM

## 2024-07-10 PROCEDURE — G0123 SCREEN CERV/VAG THIN LAYER: HCPCS | Performed by: OBSTETRICS & GYNECOLOGY

## 2024-07-10 RX ORDER — OMEPRAZOLE 40 MG/1
CAPSULE, DELAYED RELEASE ORAL
COMMUNITY
Start: 2024-07-07

## 2024-07-10 NOTE — PROGRESS NOTES
"Thais Saenz is a 29 y.o. female who presents for her routine annual exam. Overall, patient reports to be doing ok, but still having problems with menses and DUB.  Periods are regular every 19-20 days, lasting 6 days. Flow is still heavy, despite myomectomy in March.  She says only the first period after that surgery was better.  Dysmenorrhea:moderate,  this is better since myomectomy . Cyclic symptoms include bloating. Patient reports she has been having a lot of spotting, which she says is worse after she gets an iron infusion.      Current contraception: abstinence    Regular self breast exam: no  History of abnormal Pap smear: yes - patient says she had some type of abnormal result in the past, but her doctor told her not to worry about it  and no further testing was done  History of abnormal mammogram:  N/A  Exercise: frequently    The following portions of the patient's history were reviewed and updated as appropriate: allergies, current medications, past family history, past medical history, past social history, past surgical history, and problem list.    not sexually active at this time.    Family History  Family History   Problem Relation Age of Onset    Uterine cancer Mother 29    Breast cancer Paternal Grandmother     Breast cancer Maternal Aunt        Review of Systems  Review of Systems   Constitutional:  Negative for activity change and unexpected weight loss.   HENT:  Negative for congestion.    Eyes:  Positive for visual disturbance (wears glasses).   Cardiovascular:  Positive for chest pain (currently with \"pinched\" pectoralis muscle from lifting on her grandmother.  Saw PCP for this a few days ago).   Gastrointestinal:  Positive for abdominal distention. Negative for abdominal pain, blood in stool, constipation and diarrhea.   Endocrine: Negative for cold intolerance and heat intolerance.   Genitourinary:  Positive for menstrual problem (every 21 days,  heavy, painful), pelvic pain " "(some pain even when not on menstrual cycle) and vaginal bleeding (intermenstrual spotting). Negative for breast lump, breast pain, urinary incontinence and vaginal discharge.        Not sexually active   Musculoskeletal:  Positive for back pain (\"something wrong with discs in my back\" but cannot remember what she was told). Negative for arthralgias.   Skin:  Negative for rash.   Neurological:  Positive for headache (usually around the time of menses). Negative for dizziness.   Psychiatric/Behavioral:  Positive for depressed mood. Negative for sleep disturbance. The patient is nervous/anxious.         Feels like medication is managing anxiety and depression most days.             Objective        /70   Ht 167.6 cm (66\")   Wt 105 kg (231 lb)   LMP 06/27/2024 (Exact Date)   BMI 37.28 kg/m²   Physical Exam  Vitals and nursing note reviewed. Exam conducted with a chaperone present.   Constitutional:       General: She is not in acute distress.     Appearance: Normal appearance. She is well-developed. She is obese.   HENT:      Head: Normocephalic and atraumatic.   Cardiovascular:      Rate and Rhythm: Normal rate and regular rhythm.      Heart sounds: No murmur heard.  Pulmonary:      Effort: Pulmonary effort is normal.      Breath sounds: Normal breath sounds.   Chest:   Breasts:     Right: No inverted nipple or mass.      Left: No inverted nipple or mass.   Abdominal:      General: There is no distension.      Palpations: Abdomen is soft.      Tenderness: There is abdominal tenderness (across lower abdomen).   Genitourinary:     General: Normal vulva.      Exam position: Lithotomy position.      Labia:         Right: No tenderness or lesion.         Left: No tenderness or lesion.       Vagina: Normal. No vaginal discharge, tenderness or bleeding.      Cervix: No cervical motion tenderness, discharge or friability.      Adnexa:         Right: No tenderness or fullness.          Left: No tenderness or " fullness.     Musculoskeletal:         General: Normal range of motion.      Cervical back: Normal range of motion and neck supple.   Skin:     General: Skin is warm and dry.   Neurological:      Mental Status: She is alert and oriented to person, place, and time.   Psychiatric:         Mood and Affect: Mood normal.         Behavior: Behavior normal.         Thought Content: Thought content normal.         Judgment: Judgment normal.               Assessment  & Plan    Diagnoses and all orders for this visit:    1. Encounter for gynecological examination with abnormal finding (Primary): Exam remarkable for some lower pelvic tenderness on exam.  Cervix normal in appearance; Pap collected.  Regular self breast exam encouraged, and technique reviewed during exam.  Routine screening labs not indicated.  -     Liquid-based Pap Smear, Screening    2. Menorrhagia with regular cycle: Patient reports menses were better for the period right after her myomectomy, but have been heavier since.  Patient has been given information about uterine artery embolization, so that she can research that option.  She is considering hysterectomy, with the adopting when she is ready to have children.  Patient unsure how she wants to proceed    3. Uterine leiomyoma, unspecified location: The patient's largest fibroid was removed from the fundus of her uterus and March 2024.  There was also a moderate size fibroid in the left broad ligament which I did not attempt to resect since I was worried that bleeding from her uterine artery might result in the necessity for a hysterectomy.  At that time, the patient had made it very clear that she went to do everything to preserve her fertility.    4. Anxiety and depression: Patient feels like these are currently well-managed with medication    5. Type 2 diabetes mellitus without complication, without long-term current use of insulin  Comments:  Pt reports last A1C was less than 6 (done at [facility])        This note was electronically signed.    Elmira Avitia MD  7/10/2024  08:24 CDT

## 2024-07-11 LAB
GEN CATEG CVX/VAG CYTO-IMP: NORMAL
LAB AP CASE REPORT: NORMAL
LAB AP GYN ADDITIONAL INFORMATION: NORMAL
LAB AP GYN OTHER FINDINGS: NORMAL
Lab: NORMAL
PATH INTERP SPEC-IMP: NORMAL
STAT OF ADQ CVX/VAG CYTO-IMP: NORMAL

## 2024-07-17 ENCOUNTER — TRANSCRIBE ORDERS (OUTPATIENT)
Dept: ADMINISTRATIVE | Facility: HOSPITAL | Age: 29
End: 2024-07-17
Payer: COMMERCIAL

## 2024-07-17 DIAGNOSIS — Z11.51 SPECIAL SCREENING EXAMINATION FOR HUMAN PAPILLOMAVIRUS (HPV): Primary | ICD-10-CM

## 2024-07-26 ENCOUNTER — INFUSION (OUTPATIENT)
Dept: ONCOLOGY | Facility: HOSPITAL | Age: 29
End: 2024-07-26
Payer: COMMERCIAL

## 2024-07-26 VITALS
HEART RATE: 82 BPM | DIASTOLIC BLOOD PRESSURE: 79 MMHG | RESPIRATION RATE: 20 BRPM | TEMPERATURE: 97.5 F | OXYGEN SATURATION: 99 % | SYSTOLIC BLOOD PRESSURE: 118 MMHG

## 2024-07-26 DIAGNOSIS — D50.9 IRON DEFICIENCY ANEMIA, UNSPECIFIED IRON DEFICIENCY ANEMIA TYPE: Primary | ICD-10-CM

## 2024-07-26 PROCEDURE — 25810000003 SODIUM CHLORIDE 0.9 % SOLUTION: Performed by: NURSE PRACTITIONER

## 2024-07-26 PROCEDURE — 96365 THER/PROPH/DIAG IV INF INIT: CPT

## 2024-07-26 PROCEDURE — 96374 THER/PROPH/DIAG INJ IV PUSH: CPT

## 2024-07-26 PROCEDURE — 25010000002 IRON SUCROSE PER 1 MG: Performed by: NURSE PRACTITIONER

## 2024-07-26 RX ORDER — DIPHENHYDRAMINE HYDROCHLORIDE 50 MG/ML
50 INJECTION INTRAMUSCULAR; INTRAVENOUS AS NEEDED
Status: DISCONTINUED | OUTPATIENT
Start: 2024-07-26 | End: 2024-07-26 | Stop reason: HOSPADM

## 2024-07-26 RX ORDER — FAMOTIDINE 10 MG/ML
20 INJECTION, SOLUTION INTRAVENOUS AS NEEDED
Status: DISCONTINUED | OUTPATIENT
Start: 2024-07-26 | End: 2024-07-26 | Stop reason: HOSPADM

## 2024-07-26 RX ORDER — SODIUM CHLORIDE 9 MG/ML
20 INJECTION, SOLUTION INTRAVENOUS ONCE
Status: COMPLETED | OUTPATIENT
Start: 2024-07-26 | End: 2024-07-26

## 2024-07-26 RX ADMIN — IRON SUCROSE 200 MG: 20 INJECTION, SOLUTION INTRAVENOUS at 14:30

## 2024-07-26 RX ADMIN — SODIUM CHLORIDE 20 ML/HR: 9 INJECTION, SOLUTION INTRAVENOUS at 14:21

## 2024-08-02 ENCOUNTER — INFUSION (OUTPATIENT)
Dept: ONCOLOGY | Facility: HOSPITAL | Age: 29
End: 2024-08-02
Payer: COMMERCIAL

## 2024-08-02 VITALS
BODY MASS INDEX: 38.54 KG/M2 | SYSTOLIC BLOOD PRESSURE: 106 MMHG | WEIGHT: 238.8 LBS | HEART RATE: 97 BPM | OXYGEN SATURATION: 100 % | TEMPERATURE: 97.4 F | DIASTOLIC BLOOD PRESSURE: 63 MMHG | RESPIRATION RATE: 16 BRPM

## 2024-08-02 DIAGNOSIS — D50.9 IRON DEFICIENCY ANEMIA, UNSPECIFIED IRON DEFICIENCY ANEMIA TYPE: Primary | ICD-10-CM

## 2024-08-02 PROCEDURE — 96374 THER/PROPH/DIAG INJ IV PUSH: CPT

## 2024-08-02 PROCEDURE — 25810000003 SODIUM CHLORIDE 0.9 % SOLUTION: Performed by: NURSE PRACTITIONER

## 2024-08-02 PROCEDURE — 96365 THER/PROPH/DIAG IV INF INIT: CPT

## 2024-08-02 PROCEDURE — 25010000002 IRON SUCROSE PER 1 MG: Performed by: NURSE PRACTITIONER

## 2024-08-02 RX ORDER — SODIUM CHLORIDE 9 MG/ML
20 INJECTION, SOLUTION INTRAVENOUS ONCE
Status: COMPLETED | OUTPATIENT
Start: 2024-08-02 | End: 2024-08-02

## 2024-08-02 RX ORDER — DIPHENHYDRAMINE HCL 25 MG
25 CAPSULE ORAL ONCE
Status: DISCONTINUED | OUTPATIENT
Start: 2024-08-02 | End: 2024-08-02 | Stop reason: HOSPADM

## 2024-08-02 RX ORDER — DIPHENHYDRAMINE HYDROCHLORIDE 50 MG/ML
50 INJECTION INTRAMUSCULAR; INTRAVENOUS AS NEEDED
Status: DISCONTINUED | OUTPATIENT
Start: 2024-08-02 | End: 2024-08-02 | Stop reason: HOSPADM

## 2024-08-02 RX ORDER — FAMOTIDINE 10 MG/ML
20 INJECTION, SOLUTION INTRAVENOUS AS NEEDED
Status: DISCONTINUED | OUTPATIENT
Start: 2024-08-02 | End: 2024-08-02 | Stop reason: HOSPADM

## 2024-08-02 RX ORDER — ACETAMINOPHEN 325 MG/1
650 TABLET ORAL ONCE
Status: DISCONTINUED | OUTPATIENT
Start: 2024-08-02 | End: 2024-08-02 | Stop reason: HOSPADM

## 2024-08-02 RX ADMIN — IRON SUCROSE 200 MG: 20 INJECTION, SOLUTION INTRAVENOUS at 14:17

## 2024-08-02 RX ADMIN — SODIUM CHLORIDE 20 ML/HR: 9 INJECTION, SOLUTION INTRAVENOUS at 14:16

## 2024-08-29 ENCOUNTER — LAB (OUTPATIENT)
Dept: LAB | Facility: HOSPITAL | Age: 29
End: 2024-08-29
Payer: COMMERCIAL

## 2024-08-29 ENCOUNTER — OFFICE VISIT (OUTPATIENT)
Dept: ONCOLOGY | Facility: CLINIC | Age: 29
End: 2024-08-29
Payer: COMMERCIAL

## 2024-08-29 VITALS
TEMPERATURE: 97.2 F | WEIGHT: 241 LBS | RESPIRATION RATE: 16 BRPM | HEIGHT: 66 IN | HEART RATE: 82 BPM | SYSTOLIC BLOOD PRESSURE: 126 MMHG | OXYGEN SATURATION: 98 % | DIASTOLIC BLOOD PRESSURE: 84 MMHG | BODY MASS INDEX: 38.73 KG/M2

## 2024-08-29 DIAGNOSIS — R71.8 MICROCYTOSIS: ICD-10-CM

## 2024-08-29 DIAGNOSIS — N92.1 MENORRHAGIA WITH IRREGULAR CYCLE: ICD-10-CM

## 2024-08-29 DIAGNOSIS — D50.9 IRON DEFICIENCY ANEMIA, UNSPECIFIED IRON DEFICIENCY ANEMIA TYPE: Primary | ICD-10-CM

## 2024-08-29 DIAGNOSIS — D50.9 IRON DEFICIENCY ANEMIA, UNSPECIFIED IRON DEFICIENCY ANEMIA TYPE: ICD-10-CM

## 2024-08-29 LAB
25(OH)D3 SERPL-MCNC: 11.5 NG/ML (ref 30–100)
ALBUMIN SERPL-MCNC: 4.1 G/DL (ref 3.5–5.2)
ALBUMIN/GLOB SERPL: 1.3 G/DL
ALP SERPL-CCNC: 65 U/L (ref 39–117)
ALT SERPL W P-5'-P-CCNC: 6 U/L (ref 1–33)
ANION GAP SERPL CALCULATED.3IONS-SCNC: 6 MMOL/L (ref 5–15)
AST SERPL-CCNC: 11 U/L (ref 1–32)
BASOPHILS # BLD AUTO: 0.05 10*3/MM3 (ref 0–0.2)
BASOPHILS NFR BLD AUTO: 1.1 % (ref 0–1.5)
BILIRUB SERPL-MCNC: 0.4 MG/DL (ref 0–1.2)
BUN SERPL-MCNC: 7 MG/DL (ref 6–20)
BUN/CREAT SERPL: 13 (ref 7–25)
CALCIUM SPEC-SCNC: 9.1 MG/DL (ref 8.6–10.5)
CHLORIDE SERPL-SCNC: 103 MMOL/L (ref 98–107)
CO2 SERPL-SCNC: 28 MMOL/L (ref 22–29)
CREAT SERPL-MCNC: 0.54 MG/DL (ref 0.57–1)
DEPRECATED RDW RBC AUTO: 49 FL (ref 37–54)
EGFRCR SERPLBLD CKD-EPI 2021: 129.6 ML/MIN/1.73
EOSINOPHIL # BLD AUTO: 0.12 10*3/MM3 (ref 0–0.4)
EOSINOPHIL NFR BLD AUTO: 2.7 % (ref 0.3–6.2)
ERYTHROCYTE [DISTWIDTH] IN BLOOD BY AUTOMATED COUNT: 17.2 % (ref 12.3–15.4)
FOLATE SERPL-MCNC: 6.25 NG/ML (ref 4.78–24.2)
GLOBULIN UR ELPH-MCNC: 3.1 GM/DL
GLUCOSE SERPL-MCNC: 89 MG/DL (ref 65–99)
HCT VFR BLD AUTO: 42.2 % (ref 34–46.6)
HGB BLD-MCNC: 13.1 G/DL (ref 12–15.9)
IMM GRANULOCYTES # BLD AUTO: 0.01 10*3/MM3 (ref 0–0.05)
IMM GRANULOCYTES NFR BLD AUTO: 0.2 % (ref 0–0.5)
LYMPHOCYTES # BLD AUTO: 2.2 10*3/MM3 (ref 0.7–3.1)
LYMPHOCYTES NFR BLD AUTO: 49.3 % (ref 19.6–45.3)
MCH RBC QN AUTO: 24.4 PG (ref 26.6–33)
MCHC RBC AUTO-ENTMCNC: 31 G/DL (ref 31.5–35.7)
MCV RBC AUTO: 78.7 FL (ref 79–97)
MONOCYTES # BLD AUTO: 0.35 10*3/MM3 (ref 0.1–0.9)
MONOCYTES NFR BLD AUTO: 7.8 % (ref 5–12)
NEUTROPHILS NFR BLD AUTO: 1.73 10*3/MM3 (ref 1.7–7)
NEUTROPHILS NFR BLD AUTO: 38.9 % (ref 42.7–76)
NRBC BLD AUTO-RTO: 0 /100 WBC (ref 0–0.2)
PLATELET # BLD AUTO: 309 10*3/MM3 (ref 140–450)
PMV BLD AUTO: 8.9 FL (ref 6–12)
POTASSIUM SERPL-SCNC: 4.4 MMOL/L (ref 3.5–5.2)
PROT SERPL-MCNC: 7.2 G/DL (ref 6–8.5)
RBC # BLD AUTO: 5.36 10*6/MM3 (ref 3.77–5.28)
SODIUM SERPL-SCNC: 137 MMOL/L (ref 136–145)
VIT B12 BLD-MCNC: 372 PG/ML (ref 211–946)
WBC NRBC COR # BLD AUTO: 4.46 10*3/MM3 (ref 3.4–10.8)

## 2024-08-29 PROCEDURE — 36415 COLL VENOUS BLD VENIPUNCTURE: CPT

## 2024-08-29 PROCEDURE — 82306 VITAMIN D 25 HYDROXY: CPT

## 2024-08-29 PROCEDURE — 1126F AMNT PAIN NOTED NONE PRSNT: CPT | Performed by: NURSE PRACTITIONER

## 2024-08-29 PROCEDURE — 82607 VITAMIN B-12: CPT

## 2024-08-29 PROCEDURE — 80053 COMPREHEN METABOLIC PANEL: CPT

## 2024-08-29 PROCEDURE — 85025 COMPLETE CBC W/AUTO DIFF WBC: CPT

## 2024-08-29 PROCEDURE — 99214 OFFICE O/P EST MOD 30 MIN: CPT | Performed by: NURSE PRACTITIONER

## 2024-08-29 PROCEDURE — 82746 ASSAY OF FOLIC ACID SERUM: CPT

## 2024-08-29 PROCEDURE — 83020 HEMOGLOBIN ELECTROPHORESIS: CPT

## 2024-08-29 RX ORDER — TIRZEPATIDE 7.5 MG/.5ML
7.5 INJECTION, SOLUTION SUBCUTANEOUS WEEKLY
COMMUNITY

## 2024-08-29 NOTE — PROGRESS NOTES
[facility] ON[facility] HEMATOLOGY & ONCOLOGY [place]  [address]  [facility] [address] [phone]    Patient Name: Tim Saenz  Encounter Date: 08/29/2024   YOB: 1995  Patient Number: [id number]    PROGRESS NOTE    HISTORY OF PRESENT ILLNESS: Tim Saenz is a 29 y.o. female followed by this office for iron deficiency anemia. History is obtained from patient. History is considered to be accurate.    She has health history significant for DM and iron deficiency, depression/anxiety. She also reports frequent menstruation r/t fibroids which is managed by GYN.  She is taking oral iron once daily.     Scheduled for myomectomy March 11, 2024.    She had Venofer 200 mg on Feb 16, & 29 2024.  She reports some muscle pain after her first infusion.   Had myomectomy March 11, 2024. She continues to have some heavy bleeding but states is has improved.    I have reviewed the HPI and verified with the patient the accuracy of it. No changes to history since the information was documented.  Cecy Jin, APRN.  08/29/2024     INTERVAL HISTORY     Pt presents to clinic for continued follow up.   She received Venofer 200 mg on June 14, 21, 28, July 5, 26, August 2, 2024      She states she continues to feel fatigued.  Doesn't feel rested when she wakes.        LABS    Lab Results - Last 18 Months   Lab Units 05/31/24  0903 02/29/24  0914 01/11/24  0926 11/30/23  0904 08/31/23  0916 05/31/23  0849 04/19/23  0840   HEMOGLOBIN g/dL 11.6* 13.1 12.8 12.9 13.1 12.7 12.3   HEMATOCRIT % 38.5 41.5 41.6 42.6 43.8 42.0 42.9   MCV fL 75.6* 81.5 82.7 83.9 82.5 76.9* 74.4*   WBC 10*3/mm3 4.38 4.20 3.40 3.44 3.87 3.12* 4.41   RDW % 13.8 12.5 13.0 12.6 14.0 17.0* 22.8*   MPV fL 9.3 8.8 8.9 8.8 9.1 9.4 9.2   PLATELETS 10*3/mm3 336 308 261 284 317 348 407   IMM GRAN % % 0.2 0.2  --   --  0.0 0.0  --    NEUTROS ABS 10*3/mm3 1.86 1.63* 1.70 1.51* 1.58* 1.15* 1.62*   LYMPHS  "ABS 10*3/mm3 1.94 2.04  --   --  1.83 1.61  --    MONOS ABS 10*3/mm3 0.41 0.33  --   --  0.30 0.23  --    EOS ABS 10*3/mm3 0.11 0.14 0.07 0.07 0.11 0.08 0.04   BASOS ABS 10*3/mm3 0.05 0.05  --  0.07 0.05 0.05  --    IMMATURE GRANS (ABS) 10*3/mm3 0.01 0.01  --   --  0.00 0.00  --    NRBC /100 WBC 0.0 0.0  --   --  0.0 0.0  --    NEUTROPHIL % %  --   --  49.0 43.9  --   --  36.7*   MONOCYTES % %  --   --  7.0  --   --   --  5.1   BASOPHIL % %  --   --   --  2.0*  --   --   --    ATYP LYMPH % %  --   --  7.0* 9.2*  --   --  4.1   ANISOCYTOSIS   --   --   --   --   --   --  Slight/1+   GIANT PLT   --   --  Slight/1+  --   --   --  Slight/1+       Lab Results - Last 18 Months   Lab Units 05/31/24  0903 02/29/24  0914 01/11/24  0926 11/30/23  0904 08/31/23  0916 05/31/23  0849   GLUCOSE mg/dL 77 84 86 93 75 87   SODIUM mmol/L 138 137 138 138 138 139   POTASSIUM mmol/L 4.2 4.3 3.8 3.6 4.2 3.8   CO2 mmol/L 24.0 27.0 26.0 29.0 26.0 25.0   CHLORIDE mmol/L 103 102 105 102 103 106   ANION GAP mmol/L 11.0 8.0 7.0 7.0 9.0 8.0   CREATININE mg/dL 0.58 0.69 0.64 0.62 0.65 0.61   BUN mg/dL 5* 8 6 8 8 5*   BUN / CREAT RATIO  8.6 11.6 9.4 12.9 12.3 8.2   CALCIUM mg/dL 8.5* 8.4* 8.4* 8.5* 8.4* 8.8   ALK PHOS U/L 67 62 66 63 69 66   TOTAL PROTEIN g/dL 7.0 7.0 7.2 7.0 7.3 7.4   ALT (SGPT) U/L <5 6 6 5 6 6   AST (SGOT) U/L 10 11 11 10 12 11   BILIRUBIN mg/dL 0.5 0.4 0.3 0.9 0.7 0.6   ALBUMIN g/dL 4.0 3.9 3.9 3.9 4.3 4.1   GLOBULIN gm/dL 3.0 3.1 3.3 3.1 3.0 3.3       No results for input(s): \"MSPIKE\", \"KAPPALAMB\", \"IGLFLC\", \"URICACID\", \"FREEKAPPAL\", \"CEA\", \"LDH\", \"REFLABREPO\" in the last [id number] hours.    Lab Results - Last 18 Months   Lab Units 05/31/24  0903 02/29/24  0914 01/11/24  0926 11/30/23  0904 08/31/23  0916 05/31/23  0849   IRON mcg/dL 42 41 35* 63 234* 214*   TIBC mcg/dL 446 396 349 353 389 446   IRON SATURATION (TSAT) % 9* 10* 10* 18* 60* 48   FERRITIN ng/mL 8.63* 38.83 41.01 48.08 29.06 9.64*         PAST MEDICAL " HISTORY:  ALLERGIES:  Allergies   Allergen Reactions    Latex Itching    Naproxen GI Intolerance     Stomach ulcers     CURRENT MEDICATIONS:  Outpatient Encounter Medications as of 8/29/2024   Medication Sig Dispense Refill    busPIRone (BUSPAR) 5 MG tablet Take 1 tablet by mouth 3 (Three) Times a Day. Pt only takes bid      citalopram (CeleXA) 20 MG tablet       [employer] ASHWAGANDHA PO Take  by mouth. Gummies      docusate sodium 100 MG capsule Take 1 capsule by mouth As Needed (constipation).      FREESTYLE LITE test strip       Lancets (freestyle) lancets       Magnesium 250 MG tablet Take  by mouth.      Mounjaro 7.5 MG/0.5ML solution pen-injector pen Inject 0.5 mL under the skin into the appropriate area as directed 1 (One) Time Per Week.      ondansetron (ZOFRAN) 8 MG tablet Take 1 tablet by mouth Every 8 (Eight) Hours As Needed for Nausea.      [DISCONTINUED] Mounjaro 5 MG/0.5ML solution pen-injector Inject 0.5 mL under the skin into the appropriate area as directed 1 (One) Time Per Week. (Patient not taking: Reported on 8/29/2024)      [DISCONTINUED] omeprazole (priLOSEC) 40 MG capsule  (Patient not taking: Reported on 8/29/2024)       No facility-administered encounter medications on file as of 8/29/2024.     ADULT ILLNESSES:  Patient Active Problem List   Diagnosis Code    Cervical lymphadenopathy R59.0    Excessive and frequent menstruation N92.0    Iron deficiency anemia D50.9    Menorrhagia with regular cycle N92.0       HEALTH MAINTENANCE ITEMS:  Health Maintenance Due   Topic Date Due    URINE MICROALBUMIN  Never done    BMI FOLLOWUP  Never done    Pneumococcal Vaccine 0-64 (1 of 2 - PCV) Never done    DIABETIC EYE EXAM  Never done    Hepatitis B (1 of 3 - 19+ 3-dose series) Never done    TDAP/TD VACCINES (1 - Tdap) Never done    HEPATITIS C SCREENING  Never done    ANNUAL PHYSICAL  Never done    DIABETIC FOOT EXAM  Never done    HEMOGLOBIN A1C  Never done    COVID-19 Vaccine (3 - 2023-24 season)  "09/01/2023    INFLUENZA VACCINE  08/01/2024       <no information>  Last Completed Colonoscopy       This patient has no relevant Health Maintenance data.          Immunization History   Administered Date(s) Administered    COVID-19 (MODERNA) 1st,2nd,3rd Dose Monovalent 07/02/2021, 07/30/2021     Last Completed Mammogram       This patient has no relevant Health Maintenance data.              FAMILY HISTORY:  Family History   Problem Relation Age of Onset    Uterine cancer Mother 29    Breast cancer Paternal Grandmother     Breast cancer Maternal Aunt      SOCIAL HISTORY:  Social History     Socioeconomic History    Marital status: Single   Tobacco Use    Smoking status: Never    Smokeless tobacco: Never   Vaping Use    Vaping status: Never Used   Substance and Sexual Activity    Alcohol use: Not Currently    Drug use: Never    Sexual activity: Never     Birth control/protection: None       REVIEW OF SYSTEMS:  Review of Systems   Constitutional:  Negative for fatigue and fever.        \"I feel alright\"   HENT:  Negative for trouble swallowing.    Respiratory:  Negative for cough and shortness of breath.    Cardiovascular:  Negative for chest pain, palpitations and leg swelling.   Gastrointestinal:  Negative for nausea and vomiting.   Endocrine: Positive for polyuria.   Genitourinary:  Positive for menstrual problem (Menorrhagia. ). Negative for hematuria.        Fibroids   Musculoskeletal:  Negative for arthralgias and myalgias.   Skin:  Negative for rash, skin lesions and wound.   Neurological:  Positive for headache. Negative for dizziness, syncope, memory problem and confusion.   Psychiatric/Behavioral:  Positive for depressed mood (Celexa recently inrceased). Negative for suicidal ideas. The patient is nervous/anxious.      I have reviewed the ROS and verified with the patient the accuracy of it. No changes since the information was documented.  Cecy Jin, APRN 08/29/2024       /84   Pulse 82   Temp " "97.2 °F (36.2 °C)   Resp 16   Ht 167.6 cm (66\")   Wt 109 kg (241 lb)   SpO2 98%   BMI 38.90 kg/m²  Body surface area is 2.16 meters squared.    Pain Score    08/29/24 0756   PainSc: 0-No pain       Physical Exam  Constitutional:       Appearance: Normal appearance.   HENT:      Head: Normocephalic and atraumatic.   Cardiovascular:      Rate and Rhythm: Normal rate and regular rhythm.   Pulmonary:      Effort: Pulmonary effort is normal.      Breath sounds: Normal breath sounds.   Abdominal:      General: Bowel sounds are normal.      Palpations: Abdomen is soft.   Musculoskeletal:      Right lower leg: No edema.      Left lower leg: No edema.   Skin:     General: Skin is warm and dry.   Neurological:      Mental Status: She is alert and oriented to person, place, and time.   Psychiatric:         Attention and Perception: Attention normal.         Mood and Affect: Mood normal.         Judgment: Judgment normal.         Tim Saenz reports a pain score of 0.  Given her pain assessment as noted, treatment options were discussed and the following options were decided upon as a follow-up plan to address the patient's pain:  No intervention indicated. .      ASSESSMENT / PLAN:       1. Iron deficiency anemia, unspecified iron deficiency anemia type    2. Menorrhagia with irregular cycle    3. Microcytosis          1.  Iron Deficiency   -Pt is taking oral iron once daily  She received Venofer 200 mg on June 14, 21, 28, July 5, 26, August 2, 2024   -Ferritin 63, Iron 65, Sat 19.7, TIBC 330, Hgb 13.2, Hct 41.8   -Stable for observation       2.  Menorrhagia  -Contributing to iron deficiency  -Had March 11, 2024 for laparoscopic myomectomy in [place] Dr. Gilbert Marino  -She states she had one normal cycle and then it has returned to being heavy .    3.  Microcytosis  -Will check Hgb fractionation cascade today as well as B12, Folate, and Vit D r/t fatigue.     Stable for observation  Continue current medications, " treatment plans and follow up with PCP and any other providers    RTC in 3 months with labs one week before office visit for CBC, CMP, Iron profile, Ferritin   Care discussed with patient.  Understanding expressed.  Patient agreeable with plan.             Cecy Jin, APRN  08/29/2024

## 2024-08-30 LAB
HGB A MFR BLD ELPH: 97.9 % (ref 96.4–98.8)
HGB A2 MFR BLD ELPH: 2.1 % (ref 1.8–3.2)
HGB F MFR BLD ELPH: 0 % (ref 0–2)
HGB FRACT BLD-IMP: NORMAL
HGB S MFR BLD ELPH: 0 %

## 2024-09-03 RX ORDER — ERGOCALCIFEROL 1.25 MG/1
50000 CAPSULE, LIQUID FILLED ORAL WEEKLY
Qty: 5 CAPSULE | Refills: 1 | Status: SHIPPED | OUTPATIENT
Start: 2024-09-03

## 2024-11-07 DIAGNOSIS — E55.9 VITAMIN D DEFICIENCY: Primary | ICD-10-CM

## 2024-11-07 RX ORDER — ERGOCALCIFEROL 1.25 MG/1
50000 CAPSULE, LIQUID FILLED ORAL WEEKLY
Qty: 5 CAPSULE | Refills: 1 | Status: SHIPPED | OUTPATIENT
Start: 2024-11-07

## 2025-01-02 ENCOUNTER — OFFICE VISIT (OUTPATIENT)
Dept: OTOLARYNGOLOGY | Facility: CLINIC | Age: 30
End: 2025-01-02
Payer: COMMERCIAL

## 2025-01-02 ENCOUNTER — TELEPHONE (OUTPATIENT)
Dept: ONCOLOGY | Facility: CLINIC | Age: 30
End: 2025-01-02
Payer: COMMERCIAL

## 2025-01-02 ENCOUNTER — PROCEDURE VISIT (OUTPATIENT)
Dept: OTOLARYNGOLOGY | Facility: CLINIC | Age: 30
End: 2025-01-02
Payer: COMMERCIAL

## 2025-01-02 VITALS
SYSTOLIC BLOOD PRESSURE: 142 MMHG | TEMPERATURE: 98.6 F | HEIGHT: 66 IN | BODY MASS INDEX: 40.43 KG/M2 | DIASTOLIC BLOOD PRESSURE: 88 MMHG | HEART RATE: 96 BPM | WEIGHT: 251.6 LBS

## 2025-01-02 DIAGNOSIS — Z01.10 HEARING WITHIN NORMAL LIMITS IN BOTH EARS: Primary | ICD-10-CM

## 2025-01-02 DIAGNOSIS — H93.8X3 EAR PRESSURE, BILATERAL: ICD-10-CM

## 2025-01-02 DIAGNOSIS — H93.13 TINNITUS OF BOTH EARS: ICD-10-CM

## 2025-01-02 DIAGNOSIS — J31.0 CHRONIC RHINITIS: ICD-10-CM

## 2025-01-02 PROBLEM — K90.9 INTESTINAL MALABSORPTION, UNSPECIFIED: Status: ACTIVE | Noted: 2025-01-02

## 2025-01-02 PROCEDURE — 92567 TYMPANOMETRY: CPT

## 2025-01-02 PROCEDURE — 1159F MED LIST DOCD IN RCRD: CPT | Performed by: NURSE PRACTITIONER

## 2025-01-02 PROCEDURE — 99213 OFFICE O/P EST LOW 20 MIN: CPT | Performed by: NURSE PRACTITIONER

## 2025-01-02 PROCEDURE — 92557 COMPREHENSIVE HEARING TEST: CPT

## 2025-01-02 PROCEDURE — 1160F RVW MEDS BY RX/DR IN RCRD: CPT | Performed by: NURSE PRACTITIONER

## 2025-01-02 RX ORDER — DIPHENHYDRAMINE HYDROCHLORIDE 50 MG/ML
50 INJECTION INTRAMUSCULAR; INTRAVENOUS AS NEEDED
Status: CANCELLED | OUTPATIENT
Start: 2025-01-16

## 2025-01-02 RX ORDER — CARIPRAZINE 1.5 MG/1
1.5 CAPSULE, GELATIN COATED ORAL DAILY
COMMUNITY
Start: 2024-10-01

## 2025-01-02 RX ORDER — CETIRIZINE HYDROCHLORIDE 10 MG/1
10 TABLET ORAL DAILY
Qty: 30 TABLET | Refills: 2 | Status: SHIPPED | OUTPATIENT
Start: 2025-01-02

## 2025-01-02 RX ORDER — HYDROCORTISONE SODIUM SUCCINATE 100 MG/2ML
100 INJECTION INTRAMUSCULAR; INTRAVENOUS AS NEEDED
Status: CANCELLED | OUTPATIENT
Start: 2025-01-16

## 2025-01-02 RX ORDER — FAMOTIDINE 10 MG/ML
20 INJECTION, SOLUTION INTRAVENOUS AS NEEDED
Status: CANCELLED | OUTPATIENT
Start: 2025-01-16

## 2025-01-02 RX ORDER — FLUTICASONE PROPIONATE 50 MCG
2 SPRAY, SUSPENSION (ML) NASAL DAILY
Qty: 16 G | Refills: 2 | Status: SHIPPED | OUTPATIENT
Start: 2025-01-02

## 2025-01-02 RX ORDER — CETIRIZINE HYDROCHLORIDE 10 MG/1
10 TABLET ORAL DAILY
Qty: 30 TABLET | Refills: 2 | COMMUNITY
End: 2025-01-02 | Stop reason: SDUPTHER

## 2025-01-02 NOTE — TELEPHONE ENCOUNTER
----- Message from Cecy Jin sent at 1/1/2025  6:11 PM CST -----  She had appt while I was off.  She needs to be rescheduled.  Can we get her a Feraheme 510 mg on the same day as she is scheduled to see me?

## 2025-01-02 NOTE — PROGRESS NOTES
AUDIOMETRIC EVALUATION      Name:  Tim Saenz  :  1995  Age:  29 y.o.  Date of Evaluation:  2025       History:  Ms. Saenz is seen today for a hearing evaluation due to hearing concerns at the request of JEFF Carrillo.    Audiologic Information:  Concerns for Hearing: Feels that right ear hears worse   PETs: Tubes in early childhood   Other otologic surgical history: no  Aural Pressure/Fullness: Bilateral   Otalgia: no  Otorrhea: no  Tinnitus: Bilateral will only last for seconds at a time   Dizziness: Light headed dizziness with room spinning sensation that causes nausea with seemingly no trigger, will last for seconds at a time. History of motion sickness.   Noise Exposure: no  Family history of hearing loss: no  Head trauma requiring hospital stay: no  Chemotherapy: no  Other significant history: Diabetes type 2     **Case history obtained in office and through EMR system      EVALUATION:        RESULTS:    Otoscopic Evaluation:  Right: clear canal, tympanic membrane visualized  Left: clear canal, tympanic membrane visualized    Tympanometry (226 Hz):  Right: Type A  Left: Type A    Pure Tone Audiometry:      Bilateral: hearing within normal limits       Speech Audiometry:   Right: Speech Reception Threshold (SRT) was obtained at 0 dB HL  Word Recognition scores - Excellent (100)% at 50 dB, using NU-6 List 1A with 10 words  Left: Speech Reception Threshold (SRT) was obtained at 0 dB HL  Word Recognition scores - Excellent (100)% at 50 dB, using NU-6 List 2A with 10 words  SRT/PTA in good agreement bilaterally.    IMPRESSIONS:  Tympanometry showed normal middle ear pressure and static compliance, consistent with normal middle ear function for both ears.      Pure tone thresholds for both ears show hearing within normal limits, suggesting normal outer/middle ear function and normal cochlear/retrocochlear function.     Patient was counseled with regard to the findings.    Diagnosis:  1. Hearing  within normal limits in both ears         RECOMMENDATIONS/PLAN:  Follow-up recommendations per JEFF Carrillo.  Practice good communication strategies to assist with everyday listening (eye contact with speakers, reduce background noise, encourage others to communicate clearly and slowly).  Repeat hearing evaluation if changes in hearing are noted or concerns arise.  Discussed results and recommendations with patient. Questions were addressed and the patient was encouraged to contact our department should concerns arise.        Maggie Iniguez, CCC-A  Doctor of Audiology

## 2025-01-02 NOTE — PATIENT INSTRUCTIONS
>NASAL STEROID use:  Using nasal steroids:  You will be prescribed one of the following nasal steroids: Flonase, Nasacort, Nasonex, Rhinocort, Qnasl, Zetonna  2 puffs each nostril 2 times daily  Start as soon as possible  If you are using Afrin for 3 days with the nasal steroid,  Use Afrin first and wait 10 minutes to allow the nose to open. Then administer nasal steroids.     >NASAL SALINE:  Use 2 puffs each nostril 4-6 times daily and more frequently if possible.  You can buy saline spray or you can make your own and use an old spray bottle to administer  Use a humidifier at bedside  Recipe for saline:  Water                                 1 quart  Salt (table)                        1 tablespoon  Gylcerin (or Anya Syrup)    1 teaspoon  Sodium bicarbonate           1 teaspoon  Sprays or Jacki pots are recommended    Do not allow to stand for more than 24 hrs. Make new solution. There is no preservative in this solution.    Sinus irrigation and saline application can be enhanced with various over-the-counter products.  A WaterPik can be quite useful to irrigate, especially following sinus surgery.  Navage makes a product that irrigates the nose and some of the sinuses.  NeilMed makes a Sinu-Gator to irrigate the sinuses.  Neomed also has canned saline that we will come out under pressure.  A Jacki pot can also be helpful.  All of these products help keep the nose clear of debris.  Please use as directed on the instructions that come with the particular device.     How to pop ears:  Pop your ears by holding nose and closing mouth, then blow hard until ears pop  Children (less than 8-9 years)- blow up ballons 4 times a day and more frequently

## 2025-01-02 NOTE — PROGRESS NOTES
JEFF Sauceda  W ENT [facility] EAR NOSE & THROAT  [address], [address]  [place] [address]  Fax [phone]  Phone [phone]      Visit Type: NEW PATIENT   Chief Complaint   Patient presents with    Hearing Loss     Unspecified            HISTORY OBTAINED FROM: patient  MARCIA Saenz is a 29 y.o. female who presents for evaluation of ear complaints.  She admits hearing loss.  Present to ears bilaterally but right is worse than left.  Has been present for over a year.   Associated symptoms include ear pressure, tinnitus, and occasional spinning dizziness when she gets pressure in her ears or spins quickly.   Tinnitus is described as high pitched hum, intermittent. Quiet makes worse, masking or background noise helps. Does not affect sleep or daily activity.     She does admit some chronic allergy symptoms. Admits chronic rhinitis with congestion and drainage year round, does worse with weather changes. Denies recurrent sinus infections. Does not use any medication for this.         Past Medical History:   Diagnosis Date    Back pain     Chronic pain     Depression     Diabetes mellitus, type 2     History of stomach ulcers     GLORIA (iron deficiency anemia)     Social anxiety disorder        Past Surgical History:   Procedure Laterality Date    CARPAL TUNNEL RELEASE Bilateral     COLONOSCOPY      EAR TUBES      ENDOSCOPY      MYOMECTOMY N/A 3/11/2024    Procedure: MYOMECTOMY LAPAROSCOPIC WITH [employer] ROBOT;  Surgeon: Elmira Avitia MD;  Location: Health system;  Service: Robotics - [place];  Laterality: N/A;    WISDOM TOOTH EXTRACTION      x2       Family History: Her family history includes Breast cancer in her maternal aunt and paternal grandmother; Uterine cancer (age of onset: 29) in her mother.     Social History: She  reports that she has never smoked. She has never used smokeless tobacco. She reports that she does not currently use alcohol.  She reports that she does not use drugs.    Home Medications:  Ashwagandha, Cariprazine HCl, Magnesium, Tirzepatide, busPIRone, cetirizine, citalopram, docusate sodium, fluticasone, freestyle, glucose blood, ondansetron, and vitamin D    Allergies:  She is allergic to latex and naproxen.       Vital Signs:   Temp:  [98.6 °F (37 °C)] 98.6 °F (37 °C)  Heart Rate:  [96] 96  BP: (142)/(88) 142/88  ENT Physical Exam  Constitutional  Appearance: patient appears well-developed, well-nourished and well-groomed,  Communication/Voice: communication appropriate for developmental age; vocal quality normal;  Head and Face  Appearance: head appears normal and face appears normal;  Palpation: facial palpation normal;  Ear  Hearing: intact;  Auricles: bilateral auricles normal;  External Mastoids: bilateral external mastoids normal;  Ear Canals: bilateral ear canals normal;  Tympanic Membranes: bilateral tympanic membranes normal;  Nose  External Nose: nares patent bilaterally; external nose normal;  Internal Nose: nasal mucosa normal; nasal septal deviation present; deviation is to the right, septal deviation is intermediate; bilateral inferior turbinates bluish; with hypertrophy;  Oral Cavity/Oropharynx  Lips: normal;  Teeth: normal;  Gums: gingiva normal;  Tongue: normal;  Oral mucosa: normal;  Hard palate: normal;  Soft palate: normal;  Tonsils: normal;  Respiratory  Inspection: breathing unlabored; normal breathing rate;  Cardiovascular  Inspection: extremities are warm and well perfused;  Neurovestibular  Mental Status: alert and oriented;           Result Review       RESULTS REVIEW    I have reviewed the patients old records in the chart.   The following results/records were reviewed:     REFERRAL/PRE-AUTH MRN - SCAN - REF_JOEINA LÓPEZ, APRN_10/01/2024 (10/11/2024)     PROGRESS NOTES - SCAN - PN_RAYMOND DAWN, APRN_10/01/24 (10/01/2024)     Procedure visit with Maggie Carr Au.D (01/02/2025)           Assessment & Plan  Hearing within normal limits in both ears    Ear pressure, bilateral    Tinnitus of both ears    Chronic rhinitis           New Medications Ordered This Visit   Medications    fluticasone (FLONASE) 50 MCG/ACT nasal spray     Sig: Administer 2 sprays into the nostril(s) as directed by provider Daily.     Dispense:  16 g     Refill:  2    cetirizine (zyrTEC) 10 MG tablet     Sig: Take 1 tablet by mouth Daily.     Dispense:  30 tablet     Refill:  2       Audio obtained and reviewed in office with patient, reveals hearing within normal limits bilaterally.  Tympanometry is type A and word rec scores are 100% bilaterally.  Otoscopic exam is normal today.      Medical and surgical options were discussed including observation, continued medical management, medication modification, and surgical management. Risks, benefits and alternatives were discussed and questions were answered. After considering the options, the patient decided to proceed with medication modification.  Call for ear problems, especially change of hearing, ear pain or dizziness.  For the best results, use nasal sprays every day. It may take a week to build up in the nasal lining and a few more weeks to improve the eustachian tube function.  Protect getting water in the ears. If needed, may use over the counter silicone plugs or a cotton ball coated with vasoline when bathing.  Use hairdryer on a cool setting after bathing.  Use hearing protection in loud noise situations.  Use home masking techniques- use low sound level of a pleasant sound like a fan or radio at night to drown out the tinnitus sound. If not working, can consider formal masking device through our hearing aid department.  Saline nasal spray or saline gel to nose three times a day and as needed. Use a bedside humidifier at night. Place Vasoline in nose in the morning and at night.  Place Vasoline in nose in the morning and at night on the center part of the nose  (septum) at least 15 minutes prior to the nasal spray. When using the nasal spray, aim towards the outer corner of the eye.   For the best response, use your nasal sprays every day without skipping doses. It may take several weeks before the full effect is acheived.   GENERAL ALLERGY AVOIDANCE: Regular vacuum cleaning is  recommended  to prevent accumulation of allergens. Use adequate filtration, including double thickness bags or HEPA filters on the  outlet. Use air-conditioning where possible. Change central air filters with an allergy rated filter on a regular basis. Install car pollen filters where possible.   Start flonase  Start oral antihistamine  Saline spay  Pop ears      Call with questions or concerns    Return in about 3 months (around 4/2/2025) for Recheck ear and allergy complaints.        Electronically signed by JEFF Sauceda 01/02/25 9:53 AM CST.

## 2025-01-16 ENCOUNTER — INFUSION (OUTPATIENT)
Dept: ONCOLOGY | Facility: HOSPITAL | Age: 30
End: 2025-01-16
Payer: COMMERCIAL

## 2025-01-16 VITALS
TEMPERATURE: 96.9 F | HEIGHT: 66 IN | OXYGEN SATURATION: 98 % | HEART RATE: 98 BPM | WEIGHT: 253.2 LBS | DIASTOLIC BLOOD PRESSURE: 69 MMHG | BODY MASS INDEX: 40.69 KG/M2 | RESPIRATION RATE: 20 BRPM | SYSTOLIC BLOOD PRESSURE: 128 MMHG

## 2025-01-16 DIAGNOSIS — D50.9 IRON DEFICIENCY ANEMIA, UNSPECIFIED IRON DEFICIENCY ANEMIA TYPE: ICD-10-CM

## 2025-01-16 DIAGNOSIS — K90.9 INTESTINAL MALABSORPTION, UNSPECIFIED TYPE: Primary | ICD-10-CM

## 2025-01-16 PROCEDURE — 96365 THER/PROPH/DIAG IV INF INIT: CPT

## 2025-01-16 PROCEDURE — 96374 THER/PROPH/DIAG INJ IV PUSH: CPT

## 2025-01-16 PROCEDURE — 25010000002 FERUMOXYTOL 510 MG/17ML SOLUTION 17 ML VIAL: Performed by: NURSE PRACTITIONER

## 2025-01-16 RX ORDER — SODIUM CHLORIDE 9 MG/ML
20 INJECTION, SOLUTION INTRAVENOUS ONCE
Status: DISCONTINUED | OUTPATIENT
Start: 2025-01-16 | End: 2025-01-16 | Stop reason: HOSPADM

## 2025-01-16 RX ORDER — ACETAMINOPHEN 325 MG/1
650 TABLET ORAL ONCE
Status: COMPLETED | OUTPATIENT
Start: 2025-01-16 | End: 2025-01-16

## 2025-01-16 RX ORDER — CETIRIZINE HYDROCHLORIDE 10 MG/1
10 TABLET ORAL ONCE
Status: COMPLETED | OUTPATIENT
Start: 2025-01-16 | End: 2025-01-16

## 2025-01-16 RX ADMIN — FERUMOXYTOL 510 MG: 510 INJECTION INTRAVENOUS at 14:22

## 2025-01-16 RX ADMIN — ACETAMINOPHEN 650 MG: 325 TABLET ORAL at 14:12

## 2025-01-16 RX ADMIN — CETIRIZINE HYDROCHLORIDE TABLETS 10 MG: 10 TABLET, FILM COATED ORAL at 14:12

## 2025-02-24 ENCOUNTER — PATIENT ROUNDING (BHMG ONLY) (OUTPATIENT)
Dept: SURGERY | Facility: CLINIC | Age: 30
End: 2025-02-24
Payer: COMMERCIAL

## 2025-02-24 ENCOUNTER — OFFICE VISIT (OUTPATIENT)
Dept: SURGERY | Facility: CLINIC | Age: 30
End: 2025-02-24
Payer: COMMERCIAL

## 2025-02-24 VITALS
HEIGHT: 66 IN | BODY MASS INDEX: 40.34 KG/M2 | SYSTOLIC BLOOD PRESSURE: 132 MMHG | DIASTOLIC BLOOD PRESSURE: 78 MMHG | WEIGHT: 251 LBS

## 2025-02-24 DIAGNOSIS — K43.2 INCISIONAL HERNIA, WITHOUT OBSTRUCTION OR GANGRENE: Primary | ICD-10-CM

## 2025-02-24 DIAGNOSIS — K42.9 UMBILICAL HERNIA WITHOUT OBSTRUCTION AND WITHOUT GANGRENE: ICD-10-CM

## 2025-02-24 DIAGNOSIS — E66.813 CLASS 3 SEVERE OBESITY DUE TO EXCESS CALORIES WITH BODY MASS INDEX (BMI) OF 40.0 TO 44.9 IN ADULT, UNSPECIFIED WHETHER SERIOUS COMORBIDITY PRESENT: ICD-10-CM

## 2025-02-24 DIAGNOSIS — E66.01 CLASS 3 SEVERE OBESITY DUE TO EXCESS CALORIES WITH BODY MASS INDEX (BMI) OF 40.0 TO 44.9 IN ADULT, UNSPECIFIED WHETHER SERIOUS COMORBIDITY PRESENT: ICD-10-CM

## 2025-02-24 PROCEDURE — 1160F RVW MEDS BY RX/DR IN RCRD: CPT | Performed by: STUDENT IN AN ORGANIZED HEALTH CARE EDUCATION/TRAINING PROGRAM

## 2025-02-24 PROCEDURE — 1159F MED LIST DOCD IN RCRD: CPT | Performed by: STUDENT IN AN ORGANIZED HEALTH CARE EDUCATION/TRAINING PROGRAM

## 2025-02-24 PROCEDURE — 99204 OFFICE O/P NEW MOD 45 MIN: CPT | Performed by: STUDENT IN AN ORGANIZED HEALTH CARE EDUCATION/TRAINING PROGRAM

## 2025-02-24 RX ORDER — SEMAGLUTIDE 1.34 MG/ML
INJECTION, SOLUTION SUBCUTANEOUS WEEKLY
COMMUNITY

## 2025-02-24 RX ORDER — ENOXAPARIN SODIUM 100 MG/ML
30 INJECTION SUBCUTANEOUS DAILY
OUTPATIENT
Start: 2025-02-24

## 2025-02-24 NOTE — PROGRESS NOTES
Office New Patient History and Physical:     Referring Provider: Kendra Rosa APRN    Chief Complaint   Patient presents with    Hernia       Subjective .     History of present illness:    History of Present Illness  The patient presents for evaluation of a hernia.    She reports experiencing pain on the left side, which she attributes to a laparoscopic procedure performed in 03/2024. Additionally, she describes persistent discomfort on the right side, with the majority of the pain localized to this area. She is uncertain about the presence of a bulge or lump. During a recent examination by her new primary care physician, she experienced pain upon palpation of the area. She sought medical attention due to concurrent back pain. She reports no episodes of nausea or vomiting. Her surgical history is limited to a myomectomy. 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 She is not on any anticoagulant therapy.    SOCIAL HISTORY  She does not smoke.       History  Past Medical History:   Diagnosis Date    Back pain     Chronic pain     Depression     Diabetes mellitus, type 2     History of stomach ulcers     GLORIA (iron deficiency anemia)     Social anxiety disorder    ,   Past Surgical History:   Procedure Laterality Date    CARPAL TUNNEL RELEASE Bilateral     COLONOSCOPY      EAR TUBES      ENDOSCOPY      MYOMECTOMY N/A 3/11/2024    Procedure: MYOMECTOMY LAPAROSCOPIC WITH DAVINCI ROBOT;  Surgeon: Elmira Avitia MD;  Location: [facility];  Service: Robotics - DaVinci;  Laterality: N/A;    WISDOM TOOTH EXTRACTION      x2   ,   Family History   Problem Relation Age of Onset    Uterine cancer Mother 29    Breast cancer Paternal Grandmother     Breast cancer Maternal Aunt    ,   Social History     Tobacco Use    Smoking status: Never    Smokeless tobacco: Never   Vaping Use    Vaping status: Never Used   Substance Use Topics    Alcohol use: Not Currently    Drug  use: Never   , (Not in a hospital admission)   and Allergies:  Latex and Naproxen    Current Outpatient Medications:     busPIRone (BUSPAR) 5 MG tablet, Take 1 tablet by mouth 3 (Three) Times a Day. Pt only takes bid, Disp: , Rfl:     cetirizine (zyrTEC) 10 MG tablet, Take 1 tablet by mouth Daily., Disp: 30 tablet, Rfl: 2    citalopram (CeleXA) 20 MG tablet, , Disp: , Rfl:     [employer] ASHWAGANDHA PO, Take  by mouth. Gummies, Disp: , Rfl:     docusate sodium 100 MG capsule, Take 1 capsule by mouth As Needed (constipation)., Disp: , Rfl:     fluticasone (FLONASE) 50 MCG/ACT nasal spray, Administer 2 sprays into the nostril(s) as directed by provider Daily., Disp: 16 g, Rfl: 2    FREESTYLE LITE test strip, , Disp: , Rfl:     Lancets (freestyle) lancets, , Disp: , Rfl:     Magnesium 250 MG tablet, Take  by mouth., Disp: , Rfl:     ondansetron (ZOFRAN) 8 MG tablet, Take 1 tablet by mouth Every 8 (Eight) Hours As Needed for Nausea., Disp: , Rfl:     Semaglutide, 1 MG/DOSE, (Ozempic, 1 MG/DOSE,) 2 MG/1.5ML solution pen-injector, Inject  under the skin into the appropriate area as directed 1 (One) Time Per Week., Disp: , Rfl:     vitamin D (ERGOCALCIFEROL) 1.25 MG (28276 UT) capsule capsule, TAKE 1 CAPSULE BY MOUTH ONCE WEEKLY, Disp: 5 capsule, Rfl: 1    Mounjaro 7.5 MG/0.5ML solution pen-injector pen, Inject 0.5 mL under the skin into the appropriate area as directed 1 (One) Time Per Week. (Patient not taking: Reported on 2/24/2025), Disp: , Rfl:     Vraylar 1.5 MG capsule capsule, Take 1 capsule by mouth Daily., Disp: , Rfl:       Review of Systems    Review of Systems - General ROS: negative  ENT ROS: negative  Respiratory ROS: no cough, shortness of breath, or wheezing  Cardiovascular ROS: no chest pain or dyspnea on exertion  Gastrointestinal ROS: positive for - abdominal pain  Genito-Urinary ROS: no dysuria, trouble voiding, or hematuria  Dermatological ROS: negative   Breast ROS: negative for breast lumps  Hematological  "and Lymphatic ROS: negative  Musculoskeletal ROS: negative   Neurological ROS: no TIA or stroke symptoms    Psychological ROS: negative  Endocrine ROS: negative    Objective     Vital Signs   /78   Ht 167.6 cm (65.98\")   Wt 114 kg (251 lb)   BMI 40.54 kg/m²      Physical Exam:  General appearance - alert, well appearing, and in no distress  Mental status - alert, oriented to person, place, and time  Eyes - pupils equal and reactive, extraocular eye movements intact  Neck - supple, no significant adenopathy  Abdomen - soft, nontender, nondistended, no masses or organomegaly  + umbilical hernia, + right trocar site incisional hernia   Neurological - alert, oriented, normal speech, no focal findings or movement disorder noted  Physical Exam       Results Review:     The following data was reviewed by: Nicolette Staton MD on 02/24/2025:    REFERRAL/PRE-AUTH MRN - SCAN - Referral from Kendra Rosa (02/17/2025)   Right trocar site incisional hernia, umbilical hernia     Assessment & Plan       Diagnoses and all orders for this visit:    1. Incisional hernia, without obstruction or gangrene (Primary)  -     Case Request; Standing  -     MRSA Screen Culture (Outpatient) - Swab, Nares; Future  -     Enoxaparin Sodium (LOVENOX) syringe 30 mg  -     CBC & Differential; Future  -     Comprehensive Metabolic Panel; Future  -     ceFAZolin (ANCEF) 2,000 mg in sodium chloride 0.9 % 100 mL IVPB    2. Umbilical hernia without obstruction and without gangrene  -     Case Request; Standing  -     MRSA Screen Culture (Outpatient) - Swab, Nares; Future  -     Enoxaparin Sodium (LOVENOX) syringe 30 mg  -     CBC & Differential; Future  -     Comprehensive Metabolic Panel; Future  -     ceFAZolin (ANCEF) 2,000 mg in sodium chloride 0.9 % 100 mL IVPB    3. Class 3 severe obesity due to excess calories with body mass index (BMI) of 40.0 to 44.9 in adult, unspecified whether serious comorbidity present    Other orders  -     " Follow Anesthesia Guidelines / Protocol; Future  -     Follow Anesthesia Guidelines / Protocol; Standing  -     Verify / Perform Chlorhexidine Skin Prep; Standing  -     Provide NPO Instructions to Patient; Future  -     Chlorhexidine Skin Prep; Future  -     Notify physician (specify); Standing  -     Instructions on coughing, deep breathing, and incentive spirometry.; Standing  -     Oxygen Therapy-; Standing  -     Place Sequential Compression Device; Standing  -     Maintain Sequential Compression Device; Standing  -     POC Urine Pregnancy; Standing       Assessment & Plan  1. Umbilical hernia.  A CT scan confirmed the presence of an umbilical hernia. A comprehensive discussion was held regarding the nature of hernias, their potential complications, and the necessity for surgical intervention. The plan is to repair the umbilical hernia robotically through small incisions, using a piece of mesh to cover the defect. She was informed that the mesh would be covered with her own tissue to prevent issues with the bowels. If the tissue is not sufficient, a special coated mesh will be used. This is an outpatient procedure, and she will be discharged on the same day. Postoperative instructions include avoiding lifting more than 25 pounds for a month and refraining from soaking the incision in water for 2 weeks, although showers are permitted. She was advised to arrange for transportation to and from the hospital on the day of the surgery due to the administration of anesthesia. The surgery is scheduled for 04/25/2025, and she will have a preoperative appointment with the physician assistant within 30 days prior to the surgery to update her history and physical examination for insurance approval.    2. Right-sided hernia.  A CT scan confirmed the presence of a right-sided hernia, which is significant enough to potentially cause bowel obstruction. The plan is to repair this hernia concurrently with the umbilical hernia  using the same robotic approach. The procedure involves making three small incisions on the left side of the abdomen, taking down the inner lining, closing both hernia defects with a large stitch, and placing a mesh to cover them. The mesh will be covered with her own tissue, or a special coated mesh will be used if necessary. This is an outpatient procedure, and she will be discharged on the same day. Postoperative instructions include avoiding lifting more than 25 pounds for a month and refraining from soaking the incision in water for 2 weeks, although showers are permitted. She was advised to arrange for transportation to and from the hospital on the day of the surgery due to the administration of anesthesia. The surgery is scheduled for 04/25/2025, and she will have a preoperative appointment with the physician assistant within 30 days prior to the surgery to update her history and physical examination for insurance approval.    This is 2 chronic problems. I have reviewed the above imaging and note. I have ordered the above prework. She is at increased risk of perioperative complications 2/2 her elevated BMI.       Nicolette Staton MD  02/24/25  13:34 CST      Patient or patient representative verbalized consent for the use of Ambient Listening during the visit with  Nicolette Staton MD for chart documentation. 2/25/2025  17:06 CST

## 2025-02-24 NOTE — PROGRESS NOTES
[facility]     [facility]   2/24/25

## 2025-02-25 NOTE — PATIENT INSTRUCTIONS

## 2025-03-25 ENCOUNTER — OFFICE VISIT (OUTPATIENT)
Dept: ONCOLOGY | Facility: CLINIC | Age: 30
End: 2025-03-25
Payer: COMMERCIAL

## 2025-03-25 VITALS
OXYGEN SATURATION: 99 % | DIASTOLIC BLOOD PRESSURE: 78 MMHG | TEMPERATURE: 97 F | BODY MASS INDEX: 41.99 KG/M2 | SYSTOLIC BLOOD PRESSURE: 118 MMHG | HEART RATE: 94 BPM | HEIGHT: 66 IN | WEIGHT: 261.3 LBS | RESPIRATION RATE: 16 BRPM

## 2025-03-25 DIAGNOSIS — D50.9 IRON DEFICIENCY ANEMIA, UNSPECIFIED IRON DEFICIENCY ANEMIA TYPE: Primary | ICD-10-CM

## 2025-03-25 DIAGNOSIS — N92.1 MENORRHAGIA WITH IRREGULAR CYCLE: ICD-10-CM

## 2025-03-25 PROCEDURE — 1126F AMNT PAIN NOTED NONE PRSNT: CPT | Performed by: NURSE PRACTITIONER

## 2025-03-25 PROCEDURE — 99214 OFFICE O/P EST MOD 30 MIN: CPT | Performed by: NURSE PRACTITIONER

## 2025-03-25 RX ORDER — SODIUM CHLORIDE 9 MG/ML
20 INJECTION, SOLUTION INTRAVENOUS ONCE
OUTPATIENT
Start: 2025-03-31

## 2025-03-25 RX ORDER — FAMOTIDINE 10 MG/ML
20 INJECTION, SOLUTION INTRAVENOUS AS NEEDED
OUTPATIENT
Start: 2025-03-31

## 2025-03-25 RX ORDER — HYDROCORTISONE SODIUM SUCCINATE 100 MG/2ML
100 INJECTION INTRAMUSCULAR; INTRAVENOUS AS NEEDED
OUTPATIENT
Start: 2025-03-31

## 2025-03-25 RX ORDER — DIPHENHYDRAMINE HYDROCHLORIDE 50 MG/ML
50 INJECTION, SOLUTION INTRAMUSCULAR; INTRAVENOUS AS NEEDED
OUTPATIENT
Start: 2025-03-31

## 2025-03-25 RX ORDER — CETIRIZINE HYDROCHLORIDE 10 MG/1
10 TABLET ORAL ONCE
OUTPATIENT
Start: 2025-03-31

## 2025-03-25 NOTE — PROGRESS NOTES
[facility] HEMATOLOGY & ONCOLOGY [place]  [address]  [place] [address] [phone]    Patient Name: Tim Saenz  Encounter Date: 03/25/2025   YOB: 1995  Patient Number: [id number]    PROGRESS NOTE    HISTORY OF PRESENT ILLNESS: Tim Saenz is a 29 y.o. female followed by this office for iron deficiency anemia. History is obtained from patient. History is considered to be accurate.    She has health history significant for DM and iron deficiency, depression/anxiety. She also reports frequent menstruation r/t fibroids which is managed by GYN.  She is taking oral iron once daily.     Scheduled for myomectomy March 11, 2024.    She had Venofer 200 mg on Feb 16, & 29 2024.  She reports some muscle pain after her first infusion.   Had myomectomy March 11, 2024. She continues to have some heavy bleeding but states is has improved.    She received Venofer 200 mg on June 14, 21, 28, July 5, 26, August 2, 2024    I have reviewed the HPI and verified with the patient the accuracy of it. No changes to history since the information was documented.  Cecy Jin, APRN.  03/25/2025     INTERVAL HISTORY        History of Present Illness  The patient presents for evaluation of iron deficiency.    She received an iron infusion on 01/16/2025, which was administered without the usual saline bag due to a shortage. She experienced mild swelling post-infusion. Her iron levels were low at the end of January 2025, prompting a visit to her primary care physician. Despite receiving an infusion, her iron levels decreased again the following month. She is currently menstruating and anticipates another cycle prior to her scheduled surgery on 04/25/2025. She also reports persistent headaches.    She has a history of heavy menstrual cycles and is currently contemplating potential treatment options. She underwent a myomectomy in 2024 but continues to  experience heavy bleeding. She was informed that she could undergo embolization in [place], but it is uncertain whether this would alleviate her heavy periods. The alternative option presented to her was a hysterectomy.    Supplemental Information  She initially sought medical attention for severe back pain, which was intermittent in nature. She is scheduled for abdominal and umbilical hernia repair surgery on 04/25/2025.              LABS    Lab Results - Last 18 Months   Lab Units 08/29/24  0834 05/31/24  0903 02/29/24  0914 01/11/24  0926 11/30/23  0904   HEMOGLOBIN g/dL 13.1 11.6* 13.1 12.8 12.9   HEMATOCRIT % 42.2 38.5 41.5 41.6 42.6   MCV fL 78.7* 75.6* 81.5 82.7 83.9   WBC 10*3/mm3 4.46 4.38 4.20 3.40 3.44   RDW % 17.2* 13.8 12.5 13.0 12.6   MPV fL 8.9 9.3 8.8 8.9 8.8   PLATELETS 10*3/mm3 309 336 308 261 284   IMM GRAN % % 0.2 0.2 0.2  --   --    NEUTROS ABS 10*3/mm3 1.73 1.86 1.63* 1.70 1.51*   LYMPHS ABS 10*3/mm3 2.20 1.94 2.04  --   --    MONOS ABS 10*3/mm3 0.35 0.41 0.33  --   --    EOS ABS 10*3/mm3 0.12 0.11 0.14 0.07 0.07   BASOS ABS 10*3/mm3 0.05 0.05 0.05  --  0.07   IMMATURE GRANS (ABS) 10*3/mm3 0.01 0.01 0.01  --   --    NRBC /100 WBC 0.0 0.0 0.0  --   --    NEUTROPHIL % %  --   --   --  49.0 43.9   MONOCYTES % %  --   --   --  7.0  --    BASOPHIL % %  --   --   --   --  2.0*   ATYP LYMPH % %  --   --   --  7.0* 9.2*   GIANT PLT   --   --   --  Slight/1+  --        Lab Results - Last 18 Months   Lab Units 08/29/24  0834 05/31/24  0903 02/29/24  0914 01/11/24  0926 11/30/23  0904   GLUCOSE mg/dL 89 77 84 86 93   SODIUM mmol/L 137 138 137 138 138   POTASSIUM mmol/L 4.4 4.2 4.3 3.8 3.6   CO2 mmol/L 28.0 24.0 27.0 26.0 29.0   CHLORIDE mmol/L 103 103 102 105 102   ANION GAP mmol/L 6.0 11.0 8.0 7.0 7.0   CREATININE mg/dL 0.54* 0.58 0.69 0.64 0.62   BUN mg/dL 7 5* 8 6 8   BUN / CREAT RATIO  13.0 8.6 11.6 9.4 12.9   CALCIUM mg/dL 9.1 8.5* 8.4* 8.4* 8.5*   ALK PHOS U/L 65 67 62 66 63   TOTAL PROTEIN g/dL  "7.2 7.0 7.0 7.2 7.0   ALT (SGPT) U/L 6 <5 6 6 5   AST (SGOT) U/L 11 10 11 11 10   BILIRUBIN mg/dL 0.4 0.5 0.4 0.3 0.9   ALBUMIN g/dL 4.1 4.0 3.9 3.9 3.9   GLOBULIN gm/dL 3.1 3.0 3.1 3.3 3.1       No results for input(s): \"MSPIKE\", \"KAPPALAMB\", \"IGLFLC\", \"URICACID\", \"FREEKAPPAL\", \"CEA\", \"LDH\", \"REFLABREPO\" in the last 74190 hours.    Lab Results - Last 18 Months   Lab Units 08/29/24  0834 05/31/24  0903 02/29/24  0914 01/11/24  0926 11/30/23  0904   IRON mcg/dL  --  42 41 35* 63   TIBC mcg/dL  --  446 396 349 353   IRON SATURATION (TSAT) %  --  9* 10* 10* 18*   FERRITIN ng/mL  --  8.63* 38.83 41.01 48.08   FOLATE ng/mL 6.25  --   --   --   --          PAST MEDICAL HISTORY:  ALLERGIES:  Allergies   Allergen Reactions    Latex Itching    Naproxen GI Intolerance     Stomach ulcers     CURRENT MEDICATIONS:  Outpatient Encounter Medications as of 3/25/2025   Medication Sig Dispense Refill    busPIRone (BUSPAR) 5 MG tablet Take 1 tablet by mouth 3 (Three) Times a Day. Pt only takes bid      cetirizine (zyrTEC) 10 MG tablet Take 1 tablet by mouth Daily. 30 tablet 2    citalopram (CeleXA) 20 MG tablet       [employer] ASHWAGANDHA PO Take  by mouth. Gummies      docusate sodium 100 MG capsule Take 1 capsule by mouth As Needed (constipation).      FREESTYLE LITE test strip       Lancets (freestyle) lancets       Magnesium 250 MG tablet Take  by mouth.      ondansetron (ZOFRAN) 8 MG tablet Take 1 tablet by mouth Every 8 (Eight) Hours As Needed for Nausea.      vitamin D (ERGOCALCIFEROL) 1.25 MG (43401 UT) capsule capsule TAKE 1 CAPSULE BY MOUTH ONCE WEEKLY 5 capsule 1    [DISCONTINUED] fluticasone (FLONASE) 50 MCG/ACT nasal spray Administer 2 sprays into the nostril(s) as directed by provider Daily. 16 g 2    [DISCONTINUED] Semaglutide, 1 MG/DOSE, (Ozempic, 1 MG/DOSE,) 2 MG/1.5ML solution pen-injector Inject  under the skin into the appropriate area as directed 1 (One) Time Per Week.       No facility-administered encounter " "medications on file as of 3/25/2025.     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    Incisional hernia, without obstruction or gangrene K43.2    Umbilical hernia without obstruction and without gangrene K42.9       HEALTH MAINTENANCE ITEMS:  Health Maintenance Due   Topic Date Due    DIABETIC FOOT EXAM  Never done    DIABETIC EYE EXAM  Never done    URINE MICROALBUMIN-CREATININE RATIO (uACR)  Never done    Hepatitis B (1 of 3 - 19+ 3-dose series) Never done    Pneumococcal Vaccine 0-49 (1 of 2 - PCV) Never done    TDAP/TD VACCINES (1 - Tdap) Never done    HEPATITIS C SCREENING  Never done    ANNUAL PHYSICAL  Never done    COVID-19 Vaccine (3 - 2024-25 season) 09/01/2024    HEMOGLOBIN A1C  10/24/2024       <no information>  Last Completed Colonoscopy    This patient has no relevant Health Maintenance data.       Immunization History   Administered Date(s) Administered    COVID-19 (MODERNA) 1st,2nd,3rd Dose Monovalent 07/02/2021, 07/30/2021     Last Completed Mammogram    This patient has no relevant Health Maintenance data.           FAMILY HISTORY:  Family History   Problem Relation Age of Onset    Uterine cancer Mother 29    Breast cancer Paternal Grandmother     Breast cancer Maternal Aunt      SOCIAL HISTORY:  Social History     Socioeconomic History    Marital status: Single   Tobacco Use    Smoking status: Never    Smokeless tobacco: Never   Vaping Use    Vaping status: Never Used   Substance and Sexual Activity    Alcohol use: Not Currently    Drug use: Never    Sexual activity: Never     Birth control/protection: None       REVIEW OF SYSTEMS:  Review of Systems   Constitutional:  Negative for fatigue and fever.        \"I feel alright\"   HENT:  Negative for trouble swallowing.    Respiratory:  " "Negative for cough and shortness of breath.    Cardiovascular:  Negative for chest pain, palpitations and leg swelling.   Gastrointestinal:  Negative for nausea and vomiting.   Endocrine: Positive for polyuria.   Genitourinary:  Positive for menstrual problem (Menorrhagia. ). Negative for hematuria.        Fibroids   Musculoskeletal:  Negative for arthralgias and myalgias.   Skin:  Negative for rash, skin lesions and wound.   Neurological:  Positive for headache. Negative for dizziness, syncope, memory problem and confusion.   Psychiatric/Behavioral:  Positive for depressed mood (Celexa recently inrceased). Negative for suicidal ideas. The patient is nervous/anxious.      I have reviewed the ROS and verified with the patient the accuracy of it. No changes since the information was documented.  Cecy BARRERA Davin, APRN 03/25/2025       /78   Pulse 94   Temp 97 °F (36.1 °C)   Resp 16   Ht 167.6 cm (66\")   Wt 119 kg (261 lb 4.8 oz)   SpO2 99%   BMI 42.17 kg/m²  Body surface area is 2.24 meters squared.    Pain Score    03/25/25 0804   PainSc: 0-No pain       Physical Exam  Constitutional:       Appearance: Normal appearance.   HENT:      Head: Normocephalic and atraumatic.   Cardiovascular:      Rate and Rhythm: Normal rate and regular rhythm.   Pulmonary:      Effort: Pulmonary effort is normal.      Breath sounds: Normal breath sounds.   Abdominal:      General: Bowel sounds are normal.      Palpations: Abdomen is soft.   Musculoskeletal:      Right lower leg: No edema.      Left lower leg: No edema.   Skin:     General: Skin is warm and dry.   Neurological:      Mental Status: She is alert and oriented to person, place, and time.   Psychiatric:         Attention and Perception: Attention normal.         Mood and Affect: Mood normal.         Judgment: Judgment normal.         Tim Saenz reports a pain score of 0.  Given her pain assessment as noted, treatment options were discussed and the following " options were decided upon as a follow-up plan to address the patient's pain:  No intervention indicated. .      ASSESSMENT / PLAN:       1. Iron deficiency anemia, unspecified iron deficiency anemia type    2. Menorrhagia with irregular cycle      Assessment & Plan  1. Iron Deficiency: Acute. Ferritin 8 (should be 15), serum iron 23, iron saturation (SAT) 6 (should be 20).  - Administer Venofer 300 mg intravenously weekly for 3 weeks  - Recheck labs in 6 weeks to assess the need for additional iron supplementation  - If more iron is needed, further supplementation will be provided  - Recheck labs in 11 weeks    2. Menorrhagia: Chronic.  - Consider further treatment options, including potential embolization or hysterectomy, as discussed with OB-GYN  - Support with iron supplementation    Follow-up  -Labs only in 6 weeks  - Follow up in 12 weeks           Cecy Jin, APRN  03/25/2025

## 2025-04-02 ENCOUNTER — OFFICE VISIT (OUTPATIENT)
Dept: OTOLARYNGOLOGY | Facility: CLINIC | Age: 30
End: 2025-04-02
Payer: COMMERCIAL

## 2025-04-02 VITALS
DIASTOLIC BLOOD PRESSURE: 91 MMHG | HEART RATE: 109 BPM | SYSTOLIC BLOOD PRESSURE: 118 MMHG | WEIGHT: 265 LBS | BODY MASS INDEX: 42.77 KG/M2 | TEMPERATURE: 97.5 F

## 2025-04-02 DIAGNOSIS — H93.8X3 EAR PRESSURE, BILATERAL: Primary | ICD-10-CM

## 2025-04-02 DIAGNOSIS — J31.0 CHRONIC RHINITIS: ICD-10-CM

## 2025-04-02 DIAGNOSIS — H93.13 TINNITUS OF BOTH EARS: ICD-10-CM

## 2025-04-02 DIAGNOSIS — Z91.09 ENVIRONMENTAL ALLERGIES: ICD-10-CM

## 2025-04-02 RX ORDER — AZELASTINE 1 MG/ML
2 SPRAY, METERED NASAL 2 TIMES DAILY
Qty: 30 ML | Refills: 2 | Status: SHIPPED | OUTPATIENT
Start: 2025-04-02

## 2025-04-02 RX ORDER — FLUTICASONE PROPIONATE 50 MCG
2 SPRAY, SUSPENSION (ML) NASAL DAILY
Qty: 16 G | Refills: 2 | Status: SHIPPED | OUTPATIENT
Start: 2025-04-02

## 2025-04-02 RX ORDER — SEMAGLUTIDE 1.34 MG/ML
1 INJECTION, SOLUTION SUBCUTANEOUS WEEKLY
COMMUNITY
Start: 2025-03-04

## 2025-04-02 RX ORDER — ROSUVASTATIN CALCIUM 10 MG/1
5 TABLET, COATED ORAL DAILY
COMMUNITY
Start: 2025-01-24

## 2025-04-02 NOTE — PROGRESS NOTES
JEFF Sauceda  [facility] EAR [facility]SE & THROAT  [address], [address]  [facility] [address]  Fax [phone]  Phone [phone]      Visit Type: FOLLOW UP   Chief Complaint   Patient presents with    Follow-up           HISTORY OBTAINED FROM: patient  MARCIA Saenz is a 29 y.o. female who presents for follow-up evaluation of ear complaints.  She admits muffled hearing.  Present to ears bilaterally the right worse than left.  Has been present for over a year.  Associated symptoms include ear pressure, tinnitus, occasional spinning dizziness when she gets pressure in her ears or spins too quickly.  She describes tinnitus as high-pitched hum, intermittent.  Quiet makes worse, masking or background noise helps.  Affect sleep or daily activity.  She also complains of some chronic allergy symptoms.  Admits chronic rhinitis with congestion and drainage year-round, does seem worse with weather changes.  Denies recurrent sinus infections.    Since last visit she reports since last visit she has been using Flonase and oral antihistamine regularly. Did have some flair up in symptoms recently with weather changes but states she feels medication is working well. Denies new sinus or ear infections. She does report some pressure in ears but not as bad as before medication.   She does report the zytec makes her drowsy in the mornings.     Past Medical History:   Diagnosis Date    Back pain     Chronic pain     Depression     Diabetes mellitus, type 2     History of stomach ulcers     GLORIA (iron deficiency anemia)     Social anxiety disorder        Past Surgical History:   Procedure Laterality Date    CARPAL TUNNEL RELEASE Bilateral     COLONOSCOPY      EAR TUBES      ENDOSCOPY      MYOMECTOMY N/A 3/11/2024    Procedure: MYOMECTOMY LAPAROSCOPIC WITH DAVINCI ROBOT;  Surgeon: Elmira Avitia MD;  [facility]: [facility];  Service: Robotics - DaVinci;  Laterality: N/A;     WISDOM TOOTH EXTRACTION      x2       Family History: Her family history includes Breast cancer in her maternal aunt and paternal grandmother; Uterine cancer (age of onset: 29) in her mother.     Social History: She  reports that she has never smoked. She has never used smokeless tobacco. She reports that she does not currently use alcohol. She reports that she does not use drugs.    Home Medications:  Ashwagandha, Magnesium, Semaglutide (1 MG/DOSE), azelastine, busPIRone, cetirizine, citalopram, docusate sodium, fluticasone, freestyle, glucose blood, ondansetron, rosuvastatin, and vitamin D    Allergies:  She is allergic to latex and naproxen.       Vital Signs:   Temp:  [97.5 °F (36.4 °C)] 97.5 °F (36.4 °C)  Heart Rate:  [109] 109  BP: (118)/(91) 118/91  ENT Physical Exam  Constitutional  Appearance: patient appears well-developed, well-nourished and well-groomed,  Communication/Voice: communication appropriate for developmental age; vocal quality normal;  Head and Face  Appearance: head appears normal, face appears normal and face appears atraumatic;  Palpation: facial palpation normal;  Salivary: glands normal;  Ear  Hearing: intact;  Auricles: bilateral auricles normal;  External Mastoids: bilateral external mastoids normal;  Ear Canals: bilateral ear canals normal;  Tympanic Membranes: bilateral tympanic membranes normal;  Nose  External Nose: nares patent bilaterally; external nose normal;  Internal Nose: nasal mucosa normal; nasal septal deviation present; deviation is to the right, septal deviation is intermediate; bilateral inferior turbinates bluish; with hypertrophy;  Oral Cavity/Oropharynx  Lips: normal;  Teeth: normal;  Gums: gingiva normal;  Tongue: normal;  Oral mucosa: normal;  Hard palate: normal;  Soft palate: normal;  Tonsils: normal;  Respiratory  Inspection: breathing unlabored; normal breathing rate;  Cardiovascular  Inspection: extremities are warm and well  perfused;  Neurovestibular  Mental Status: alert and oriented;           Result Review       RESULTS REVIEW    I have reviewed the patients old records in the chart.             Assessment & Plan  Ear pressure, bilateral    Tinnitus of both ears    Chronic rhinitis    Environmental allergies         Medical and surgical options were discussed including observation, continued medical management, medication modification, surgical management, CT scanning, and allergy testing. Risks, benefits and alternatives were discussed and questions were answered. After considering the options, the patient decided to proceed with medication modification.  Call for ear problems, especially change of hearing, ear pain or dizziness.  For the best results, use nasal sprays every day. It may take a week to build up in the nasal lining and a few more weeks to improve the eustachian tube function.  Protect getting water in the ears. If needed, may use over the counter silicone plugs or a cotton ball coated with vasoline when bathing.  Use hairdryer on a cool setting after bathing.  Use hearing protection in loud noise situations.  Saline nasal spray or saline gel to nose three times a day and as needed. Use a bedside humidifier at night. Place Vasoline in nose in the morning and at night.  Place Vasoline in nose in the morning and at night on the center part of the nose (septum) at least 15 minutes prior to the nasal spray. When using the nasal spray, aim towards the outer corner of the eye.   For the best response, use your nasal sprays every day without skipping doses. It may take several weeks before the full effect is acheived.   GENERAL ALLERGY AVOIDANCE: Regular vacuum cleaning is  recommended  to prevent accumulation of allergens. Use adequate filtration, including double thickness bags or HEPA filters on the  outlet. Use air-conditioning where possible. Change central air filters with an allergy rated filter on a regular  basis. Install car pollen filters where possible.   Continue flonase  Stop zyrtec  Start Astelin twice daily  She is interested in allergy testing wants to wait until summer months. Will bring back in about 2 months and discuss again. Plan for nasal endoscopy at follow up.      Call with questions or concerns    Return in about 3 months (around 7/2/2025) for Recheck sinus and ear complaints.        Electronically signed by JEFF Sauceda 04/02/25 9:13 AM CDT.

## 2025-04-07 PROBLEM — K90.9 MALABSORPTION OF IRON: Status: ACTIVE | Noted: 2025-04-07

## 2025-04-10 ENCOUNTER — OFFICE VISIT (OUTPATIENT)
Dept: SURGERY | Facility: CLINIC | Age: 30
End: 2025-04-10
Payer: COMMERCIAL

## 2025-04-10 VITALS
HEART RATE: 117 BPM | BODY MASS INDEX: 42.59 KG/M2 | SYSTOLIC BLOOD PRESSURE: 109 MMHG | DIASTOLIC BLOOD PRESSURE: 75 MMHG | OXYGEN SATURATION: 98 % | WEIGHT: 265 LBS | HEIGHT: 66 IN

## 2025-04-10 DIAGNOSIS — R11.10: ICD-10-CM

## 2025-04-10 DIAGNOSIS — K42.9 UMBILICAL HERNIA WITHOUT OBSTRUCTION AND WITHOUT GANGRENE: ICD-10-CM

## 2025-04-10 DIAGNOSIS — K43.2 INCISIONAL HERNIA, WITHOUT OBSTRUCTION OR GANGRENE: Primary | ICD-10-CM

## 2025-04-10 DIAGNOSIS — Z98.890: ICD-10-CM

## 2025-04-10 RX ORDER — SEMAGLUTIDE 2.68 MG/ML
2 INJECTION, SOLUTION SUBCUTANEOUS WEEKLY
COMMUNITY

## 2025-04-10 NOTE — H&P (VIEW-ONLY)
Office New Patient History and Physical:     Referring Provider: No ref. provider found    Chief Complaint   Patient presents with    Follow-up       Subjective .     History of present illness:  Tim Saenz is a 29 y.o. female who presents for follow up of an umbilical and incisional hernia. Patient denies any new problems in regards to her hernias. She denies any enlargement. She denies any worsening pain. She also denies any problems with her bowel movements. No new surgical history since last being seen. No new medical problems since last being seen as well. Patient states that she got severely sick after her last procedure, which was not something that had happened other times that she has had anesthesia.     BMI is 42.77. She is a nonsmoker. She does not take any blood thinners.     History  Past Medical History:   Diagnosis Date    Back pain     Chronic pain     Depression     Diabetes mellitus, type 2     History of stomach ulcers     GLORIA (iron deficiency anemia)     Social anxiety disorder    ,   Past Surgical History:   Procedure Laterality Date    CARPAL TUNNEL RELEASE Bilateral     COLONOSCOPY      EAR TUBES      ENDOSCOPY      MYOMECTOMY N/A 3/11/2024    Procedure: MYOMECTOMY LAPAROSCOPIC WITH DAVINCI ROBOT;  Surgeon: Elmira Avitia MD;  Location: [facility];  Service: Robotics - DaVinci;  Laterality: N/A;    WISDOM TOOTH EXTRACTION      x2   ,   Family History   Problem Relation Age of Onset    Uterine cancer Mother 29    Breast cancer Paternal Grandmother     Breast cancer Maternal Aunt    ,   Social History     Tobacco Use    Smoking status: Never     Passive exposure: Never    Smokeless tobacco: Never   Vaping Use    Vaping status: Never Used   Substance Use Topics    Alcohol use: Not Currently    Drug use: Never   , (Not in a hospital admission)   and Allergies:  Latex and Naproxen    Current Outpatient Medications:     azelastine (ASTELIN) 0.1 % nasal spray, Administer 2 sprays into the  nostril(s) as directed by provider 2 (Two) Times a Day. Use in each nostril as directed, Disp: 30 mL, Rfl: 2    busPIRone (BUSPAR) 5 MG tablet, Take 1 tablet by mouth 3 (Three) Times a Day. Pt only takes bid, Disp: , Rfl:     cetirizine (zyrTEC) 10 MG tablet, Take 1 tablet by mouth Daily., Disp: 30 tablet, Rfl: 2    citalopram (CeleXA) 20 MG tablet, , Disp: , Rfl:     [employer] ASHWAGANDHA PO, Take  by mouth. Gummies, Disp: , Rfl:     docusate sodium 100 MG capsule, Take 1 capsule by mouth As Needed (constipation)., Disp: , Rfl:     fluticasone (FLONASE) 50 MCG/ACT nasal spray, Administer 2 sprays into the nostril(s) as directed by provider Daily., Disp: 16 g, Rfl: 2    FREESTYLE LITE test strip, , Disp: , Rfl:     Lancets (freestyle) lancets, , Disp: , Rfl:     Magnesium 250 MG tablet, Take  by mouth., Disp: , Rfl:     ondansetron (ZOFRAN) 8 MG tablet, Take 1 tablet by mouth Every 8 (Eight) Hours As Needed for Nausea., Disp: , Rfl:     rosuvastatin (CRESTOR) 10 MG tablet, Take 0.5 tablets by mouth Daily., Disp: , Rfl:     Semaglutide, 2 MG/DOSE, (Ozempic, 2 MG/DOSE,) 8 MG/3ML solution pen-injector, Inject 2 mg under the skin into the appropriate area as directed 1 (One) Time Per Week., Disp: , Rfl:     vitamin D (ERGOCALCIFEROL) 1.25 MG (65879 UT) capsule capsule, TAKE 1 CAPSULE BY MOUTH ONCE WEEKLY, Disp: 5 capsule, Rfl: 1    Ozempic, 1 MG/DOSE, 4 MG/3ML solution pen-injector, Inject 1 mg under the skin into the appropriate area as directed 1 (One) Time Per Week. (Patient not taking: Reported on 4/10/2025), Disp: , Rfl:     Review of Systems    Review of Systems - General ROS: negative  ENT ROS: negative  Respiratory ROS: no cough, shortness of breath, or wheezing  Cardiovascular ROS: no chest pain or dyspnea on exertion  Gastrointestinal ROS: positive for - umbilical hernia and RLQ incisional hernia  Genito-Urinary ROS: no dysuria, trouble voiding, or hematuria  Dermatological ROS: negative   Breast ROS: negative for  "breast lumps  Hematological and Lymphatic ROS: negative  Musculoskeletal ROS: negative   Neurological ROS: no TIA or stroke symptoms    Psychological ROS: negative  Endocrine ROS: negative    Objective     Vital Signs   /75   Pulse 117   Ht 167.6 cm (66\")   Wt 120 kg (265 lb)   SpO2 98%   BMI 42.77 kg/m²      Physical Exam:  General appearance - alert, well appearing, and in no distress  Mental status - normal mood, behavior, speech, dress, motor activity, and thought processes  Eyes - sclera anicteric  Neck - supple, no significant adenopathy  Chest - no tachypnea, retractions or cyanosis  Heart - normal rate and regular rhythm  Abdomen - soft, nontender, nondistended, no masses or organomegaly  HERNIA EXAM: umbilical hernia & RLQ incisional hernia  Neurological - alert, oriented, normal speech, no focal findings or movement disorder noted  Skin - normal coloration and turgor, no rashes, no suspicious skin lesions noted    Results Review:  Result Review :            Assessment & Plan       Diagnoses and all orders for this visit:    1. Incisional hernia, without obstruction or gangrene (Primary)    2. Umbilical hernia without obstruction and without gangrene    3. Severe vomiting following anesthesia         Tim Saenz is a 29 y.o. female with an umbilical hernia and a RLQ incisional hernia without obstruction or gangrene. This is significantly limiting the patient's life-style due to discomfort, and as such the patient wishes to proceed with repair. Risks of surgery including bleeding/hematoma, infection, mesh infection requiring removal, damage to surrounding structures (including the arteries, veins and nerves) with potential chronic post-operative pain, and hernia recurrence have been discussed with the patient. The different operative approaches available for hernia repair and their respective risks and benefits including open repair, laparoscopic repair, and robotic repair were discussed with " the patient. The patient would like to proceed with robotic laparoscopic assisted repair of both hernias. Post operative instructions including no lifting greater than 25 pounds for 1 month after surgery were reviewed. Pre-work has been scheduled for 4/18/25 and surgery for 4/25/25. Discussed with patient the need to stop ozempic medication. She states that she has already discussed that with her other provider and has been given instructions for this. Patient voices understanding and has been given details for the arrival on the day of surgery.     I also discussed with the patient post operative pain management including multimodal pain control utilizing Tylenol, ibuprofen, and Roxicodone for breakthrough pain. I will plan to give the patient 15 tabs of 5mg Roxicodone post operatively for breakthrough pain.    Follow up:           Return if symptoms worsen or fail to improve.      Beena Amato PA-C  04/10/25  16:56 CDT

## 2025-04-11 ENCOUNTER — INFUSION (OUTPATIENT)
Dept: ONCOLOGY | Facility: HOSPITAL | Age: 30
End: 2025-04-11
Payer: COMMERCIAL

## 2025-04-11 VITALS
RESPIRATION RATE: 18 BRPM | SYSTOLIC BLOOD PRESSURE: 120 MMHG | TEMPERATURE: 96.9 F | OXYGEN SATURATION: 100 % | WEIGHT: 265.6 LBS | BODY MASS INDEX: 42.68 KG/M2 | HEART RATE: 85 BPM | HEIGHT: 66 IN | DIASTOLIC BLOOD PRESSURE: 74 MMHG

## 2025-04-11 DIAGNOSIS — K90.9 MALABSORPTION OF IRON: ICD-10-CM

## 2025-04-11 DIAGNOSIS — D50.8 OTHER IRON DEFICIENCY ANEMIA: Primary | ICD-10-CM

## 2025-04-11 PROCEDURE — 96366 THER/PROPH/DIAG IV INF ADDON: CPT

## 2025-04-11 PROCEDURE — 25810000003 SODIUM CHLORIDE 0.9 % SOLUTION 250 ML FLEX CONT: Performed by: NURSE PRACTITIONER

## 2025-04-11 PROCEDURE — 25010000002 IRON SUCROSE PER 1 MG: Performed by: NURSE PRACTITIONER

## 2025-04-11 PROCEDURE — 96365 THER/PROPH/DIAG IV INF INIT: CPT

## 2025-04-11 PROCEDURE — 25810000003 SODIUM CHLORIDE 0.9 % SOLUTION: Performed by: NURSE PRACTITIONER

## 2025-04-11 RX ORDER — FAMOTIDINE 10 MG/ML
20 INJECTION, SOLUTION INTRAVENOUS AS NEEDED
OUTPATIENT
Start: 2025-04-12

## 2025-04-11 RX ORDER — CETIRIZINE HYDROCHLORIDE 10 MG/1
10 TABLET ORAL ONCE
Status: DISCONTINUED | OUTPATIENT
Start: 2025-04-11 | End: 2025-04-11 | Stop reason: HOSPADM

## 2025-04-11 RX ORDER — SODIUM CHLORIDE 9 MG/ML
20 INJECTION, SOLUTION INTRAVENOUS ONCE
Status: COMPLETED | OUTPATIENT
Start: 2025-04-11 | End: 2025-04-11

## 2025-04-11 RX ORDER — SODIUM CHLORIDE 9 MG/ML
20 INJECTION, SOLUTION INTRAVENOUS ONCE
OUTPATIENT
Start: 2025-04-12

## 2025-04-11 RX ORDER — DIPHENHYDRAMINE HYDROCHLORIDE 50 MG/ML
50 INJECTION, SOLUTION INTRAMUSCULAR; INTRAVENOUS AS NEEDED
OUTPATIENT
Start: 2025-04-12

## 2025-04-11 RX ORDER — HYDROCORTISONE SODIUM SUCCINATE 100 MG/2ML
100 INJECTION INTRAMUSCULAR; INTRAVENOUS AS NEEDED
OUTPATIENT
Start: 2025-04-12

## 2025-04-11 RX ORDER — CETIRIZINE HYDROCHLORIDE 10 MG/1
10 TABLET ORAL ONCE
OUTPATIENT
Start: 2025-04-12

## 2025-04-11 RX ADMIN — SODIUM CHLORIDE 20 ML/HR: 9 INJECTION, SOLUTION INTRAVENOUS at 10:39

## 2025-04-11 RX ADMIN — IRON SUCROSE 300 MG: 20 INJECTION, SOLUTION INTRAVENOUS at 10:40

## 2025-04-18 ENCOUNTER — PRE-ADMISSION TESTING (OUTPATIENT)
Dept: PREADMISSION TESTING | Facility: HOSPITAL | Age: 30
End: 2025-04-18
Payer: COMMERCIAL

## 2025-04-18 ENCOUNTER — INFUSION (OUTPATIENT)
Dept: ONCOLOGY | Facility: HOSPITAL | Age: 30
End: 2025-04-18
Payer: COMMERCIAL

## 2025-04-18 VITALS
DIASTOLIC BLOOD PRESSURE: 76 MMHG | RESPIRATION RATE: 18 BRPM | BODY MASS INDEX: 43.17 KG/M2 | TEMPERATURE: 96.6 F | WEIGHT: 268.6 LBS | HEART RATE: 95 BPM | OXYGEN SATURATION: 100 % | HEIGHT: 66 IN | SYSTOLIC BLOOD PRESSURE: 122 MMHG

## 2025-04-18 VITALS
HEART RATE: 107 BPM | SYSTOLIC BLOOD PRESSURE: 148 MMHG | HEIGHT: 66 IN | WEIGHT: 265.65 LBS | OXYGEN SATURATION: 97 % | DIASTOLIC BLOOD PRESSURE: 93 MMHG | BODY MASS INDEX: 42.69 KG/M2 | RESPIRATION RATE: 18 BRPM

## 2025-04-18 DIAGNOSIS — K43.2 INCISIONAL HERNIA, WITHOUT OBSTRUCTION OR GANGRENE: ICD-10-CM

## 2025-04-18 DIAGNOSIS — K90.9 MALABSORPTION OF IRON: ICD-10-CM

## 2025-04-18 DIAGNOSIS — D50.8 OTHER IRON DEFICIENCY ANEMIA: Primary | ICD-10-CM

## 2025-04-18 DIAGNOSIS — K42.9 UMBILICAL HERNIA WITHOUT OBSTRUCTION AND WITHOUT GANGRENE: ICD-10-CM

## 2025-04-18 LAB
ALBUMIN SERPL-MCNC: 3.9 G/DL (ref 3.5–5.2)
ALBUMIN/GLOB SERPL: 1.3 G/DL
ALP SERPL-CCNC: 70 U/L (ref 39–117)
ALT SERPL W P-5'-P-CCNC: 8 U/L (ref 1–33)
ANION GAP SERPL CALCULATED.3IONS-SCNC: 8 MMOL/L (ref 5–15)
AST SERPL-CCNC: 11 U/L (ref 1–32)
BASOPHILS # BLD AUTO: 0.04 10*3/MM3 (ref 0–0.2)
BASOPHILS NFR BLD AUTO: 0.8 % (ref 0–1.5)
BILIRUB SERPL-MCNC: 0.2 MG/DL (ref 0–1.2)
BUN SERPL-MCNC: 7 MG/DL (ref 6–20)
BUN/CREAT SERPL: 12.3 (ref 7–25)
CALCIUM SPEC-SCNC: 8.7 MG/DL (ref 8.6–10.5)
CHLORIDE SERPL-SCNC: 106 MMOL/L (ref 98–107)
CO2 SERPL-SCNC: 25 MMOL/L (ref 22–29)
CREAT SERPL-MCNC: 0.57 MG/DL (ref 0.57–1)
DEPRECATED RDW RBC AUTO: 49.1 FL (ref 37–54)
EGFRCR SERPLBLD CKD-EPI 2021: 127.9 ML/MIN/1.73
EOSINOPHIL # BLD AUTO: 0.11 10*3/MM3 (ref 0–0.4)
EOSINOPHIL NFR BLD AUTO: 2.1 % (ref 0.3–6.2)
ERYTHROCYTE [DISTWIDTH] IN BLOOD BY AUTOMATED COUNT: 18 % (ref 12.3–15.4)
GLOBULIN UR ELPH-MCNC: 3.1 GM/DL
GLUCOSE SERPL-MCNC: 109 MG/DL (ref 65–99)
HCT VFR BLD AUTO: 36.9 % (ref 34–46.6)
HGB BLD-MCNC: 10.9 G/DL (ref 12–15.9)
IMM GRANULOCYTES # BLD AUTO: 0.01 10*3/MM3 (ref 0–0.05)
IMM GRANULOCYTES NFR BLD AUTO: 0.2 % (ref 0–0.5)
LYMPHOCYTES # BLD AUTO: 1.52 10*3/MM3 (ref 0.7–3.1)
LYMPHOCYTES NFR BLD AUTO: 29.6 % (ref 19.6–45.3)
MCH RBC QN AUTO: 22.8 PG (ref 26.6–33)
MCHC RBC AUTO-ENTMCNC: 29.5 G/DL (ref 31.5–35.7)
MCV RBC AUTO: 77 FL (ref 79–97)
MONOCYTES # BLD AUTO: 0.32 10*3/MM3 (ref 0.1–0.9)
MONOCYTES NFR BLD AUTO: 6.2 % (ref 5–12)
NEUTROPHILS NFR BLD AUTO: 3.13 10*3/MM3 (ref 1.7–7)
NEUTROPHILS NFR BLD AUTO: 61.1 % (ref 42.7–76)
NRBC BLD AUTO-RTO: 0 /100 WBC (ref 0–0.2)
PLATELET # BLD AUTO: 365 10*3/MM3 (ref 140–450)
PMV BLD AUTO: 9.2 FL (ref 6–12)
POTASSIUM SERPL-SCNC: 3.9 MMOL/L (ref 3.5–5.2)
PROT SERPL-MCNC: 7 G/DL (ref 6–8.5)
RBC # BLD AUTO: 4.79 10*6/MM3 (ref 3.77–5.28)
SODIUM SERPL-SCNC: 139 MMOL/L (ref 136–145)
WBC NRBC COR # BLD AUTO: 5.13 10*3/MM3 (ref 3.4–10.8)

## 2025-04-18 PROCEDURE — 25810000003 SODIUM CHLORIDE 0.9 % SOLUTION: Performed by: NURSE PRACTITIONER

## 2025-04-18 PROCEDURE — 96366 THER/PROPH/DIAG IV INF ADDON: CPT

## 2025-04-18 PROCEDURE — 25010000002 IRON SUCROSE PER 1 MG: Performed by: NURSE PRACTITIONER

## 2025-04-18 PROCEDURE — 85025 COMPLETE CBC W/AUTO DIFF WBC: CPT

## 2025-04-18 PROCEDURE — 87081 CULTURE SCREEN ONLY: CPT

## 2025-04-18 PROCEDURE — 36415 COLL VENOUS BLD VENIPUNCTURE: CPT

## 2025-04-18 PROCEDURE — 96365 THER/PROPH/DIAG IV INF INIT: CPT

## 2025-04-18 PROCEDURE — 80053 COMPREHEN METABOLIC PANEL: CPT

## 2025-04-18 PROCEDURE — 25810000003 SODIUM CHLORIDE 0.9 % SOLUTION 250 ML FLEX CONT: Performed by: NURSE PRACTITIONER

## 2025-04-18 RX ORDER — FAMOTIDINE 10 MG/ML
20 INJECTION, SOLUTION INTRAVENOUS AS NEEDED
OUTPATIENT
Start: 2025-04-19

## 2025-04-18 RX ORDER — DIPHENHYDRAMINE HYDROCHLORIDE 50 MG/ML
50 INJECTION, SOLUTION INTRAMUSCULAR; INTRAVENOUS AS NEEDED
OUTPATIENT
Start: 2025-04-19

## 2025-04-18 RX ORDER — SODIUM CHLORIDE 9 MG/ML
20 INJECTION, SOLUTION INTRAVENOUS ONCE
Status: COMPLETED | OUTPATIENT
Start: 2025-04-18 | End: 2025-04-18

## 2025-04-18 RX ORDER — CETIRIZINE HYDROCHLORIDE 10 MG/1
10 TABLET ORAL ONCE
OUTPATIENT
Start: 2025-04-19

## 2025-04-18 RX ORDER — CETIRIZINE HYDROCHLORIDE 10 MG/1
10 TABLET ORAL ONCE
Status: DISCONTINUED | OUTPATIENT
Start: 2025-04-18 | End: 2025-04-18 | Stop reason: HOSPADM

## 2025-04-18 RX ORDER — HYDROCORTISONE SODIUM SUCCINATE 100 MG/2ML
100 INJECTION INTRAMUSCULAR; INTRAVENOUS AS NEEDED
Status: DISCONTINUED | OUTPATIENT
Start: 2025-04-18 | End: 2025-04-18 | Stop reason: HOSPADM

## 2025-04-18 RX ORDER — FAMOTIDINE 10 MG/ML
20 INJECTION, SOLUTION INTRAVENOUS AS NEEDED
Status: DISCONTINUED | OUTPATIENT
Start: 2025-04-18 | End: 2025-04-18 | Stop reason: HOSPADM

## 2025-04-18 RX ORDER — SODIUM CHLORIDE 9 MG/ML
20 INJECTION, SOLUTION INTRAVENOUS ONCE
OUTPATIENT
Start: 2025-04-19

## 2025-04-18 RX ORDER — HYDROCORTISONE SODIUM SUCCINATE 100 MG/2ML
100 INJECTION INTRAMUSCULAR; INTRAVENOUS AS NEEDED
OUTPATIENT
Start: 2025-04-19

## 2025-04-18 RX ORDER — DIPHENHYDRAMINE HYDROCHLORIDE 50 MG/ML
50 INJECTION, SOLUTION INTRAMUSCULAR; INTRAVENOUS AS NEEDED
Status: DISCONTINUED | OUTPATIENT
Start: 2025-04-18 | End: 2025-04-18 | Stop reason: HOSPADM

## 2025-04-18 RX ORDER — BUPROPION HYDROCHLORIDE 150 MG/1
TABLET ORAL
COMMUNITY
Start: 2025-04-03

## 2025-04-18 RX ADMIN — IRON SUCROSE 300 MG: 20 INJECTION, SOLUTION INTRAVENOUS at 12:44

## 2025-04-18 RX ADMIN — SODIUM CHLORIDE 20 ML/HR: 9 INJECTION, SOLUTION INTRAVENOUS at 12:39

## 2025-04-18 NOTE — DISCHARGE INSTRUCTIONS
Preparing for Surgery  Follow these instructions before the procedure:  Several days or weeks before your procedure  Medication(s) you need to stop   __1__ week prior to surgery: OZEMPIC      Ask your health care provider about:  Changing or stopping your regular medicines. This is especially important if you are taking diabetes medicines or blood thinners.  Taking medicines such as aspirin and ibuprofen. These medicines can thin your blood. Do not take these medicines unless your health care provider tells you to take them.  Taking over-the-counter medicines, vitamins, herbs, and supplements.    Contact your surgeon if you:  Develop a fever of more than 100.4°F (38°C) or other feelings of illness during the 48 hours before your surgery.  Have symptoms that get worse.  Have questions or concerns about your surgery.  If you are going home the same day of your surgery you will need to arrange for a responsible adult, age 18 years old or older, to drive you home from the hospital and stay with you for 24 hours. Verification of the  will be made prior to any procedure requiring sedation. You may not go home in a taxi or any form of public transportation by yourself.     Day before your procedure  Medication(s) you need to stop the day before your surgery:     24 hours before your procedure DO NOT drink alcoholic beverages or smoke.  You may be asked to shower with a germ-killing soap.  Day of your procedure   You may take the following medication(s) the morning of surgery with a sip of water: BUSPAR      8 hours before your scheduled arrival time, STOP all food, any dairy products, and full liquids. This includes hard candy, chewing gum or mints. This is extremely important to prevent serious complications.     Up to 2 hours before your scheduled arrival time, you may have clear liquids no cream, powder, or pulp of any kind. Safe options are water, black coffee, plain tea, soda, Gatorade/Powerade, clear broth,  apple juice.    2 hours before your scheduled arrival time, STOP drinking clear liquids.    You may need to take another shower with a germ-killing soap before you leave home in the morning. Do not use perfumes, colognes, or body lotions.  Wear comfortable loose-fitting clothing.  Remove all jewelry including body piercing and rings, dark colored nail polish, and make up prior to arrival at the hospital. Leave all valuables at home.   Bring your hearing aids if you rely on them.  Do not wear contact lenses. If you wear eyeglasses remember to bring a case to store them in while you are in surgery.  Do not use denture adhesives since you will be asked to remove them during your surgery.    You do not need to bring your home medications into the hospital.   Bring your sleep apnea device with you on the day of your surgery (if this applies to you).  If you have an Inspire implant for sleep apnea, please bring the remote with you on the day of surgery.  If you wear portable oxygen, bring it with you.   If you are staying overnight, you may bring a bag of items you may need such as slippers, robe and a change of clothes for your discharge. You may want to leave these items in the car until you are ready for them since your family will take your belongings when you leave the pre-operative area.  Arrive at the hospital as scheduled by the office. You will be asked to arrive 2 hours prior to your surgery time in order to prepare for your procedure.  When you arrive at the hospital  Go to the registration desk located at the main entrance of the hospital.  After registration is completed, you will be given a beeper and a sticker sheet. Take the stickers to Outpatient Surgery and place in the tray at the end of the desk to notify the staff that you have arrived and registered.   Return to the lobby to wait. You are not always called back according to the time of arrival but rather the time your doctor will be ready.  When your  beeper lights up and vibrates proceed through the double doors, under the stairs, and a member of the Outpatient Surgery staff will escort you to your preoperative room.

## 2025-04-19 LAB — MRSA SPEC QL CULT: NORMAL

## 2025-04-23 ENCOUNTER — INFUSION (OUTPATIENT)
Dept: ONCOLOGY | Facility: HOSPITAL | Age: 30
End: 2025-04-23
Payer: COMMERCIAL

## 2025-04-23 VITALS
HEART RATE: 98 BPM | WEIGHT: 267.4 LBS | OXYGEN SATURATION: 92 % | HEIGHT: 66 IN | BODY MASS INDEX: 42.97 KG/M2 | DIASTOLIC BLOOD PRESSURE: 87 MMHG | TEMPERATURE: 96.6 F | RESPIRATION RATE: 16 BRPM | SYSTOLIC BLOOD PRESSURE: 125 MMHG

## 2025-04-23 DIAGNOSIS — D50.8 OTHER IRON DEFICIENCY ANEMIA: Primary | ICD-10-CM

## 2025-04-23 DIAGNOSIS — K90.9 MALABSORPTION OF IRON: ICD-10-CM

## 2025-04-23 PROCEDURE — 25810000003 SODIUM CHLORIDE 0.9 % SOLUTION 250 ML FLEX CONT: Performed by: NURSE PRACTITIONER

## 2025-04-23 PROCEDURE — 25010000002 IRON SUCROSE PER 1 MG: Performed by: NURSE PRACTITIONER

## 2025-04-23 PROCEDURE — 96365 THER/PROPH/DIAG IV INF INIT: CPT

## 2025-04-23 PROCEDURE — 96366 THER/PROPH/DIAG IV INF ADDON: CPT

## 2025-04-23 RX ORDER — HYDROCORTISONE SODIUM SUCCINATE 100 MG/2ML
100 INJECTION INTRAMUSCULAR; INTRAVENOUS AS NEEDED
OUTPATIENT
Start: 2025-04-23

## 2025-04-23 RX ORDER — SODIUM CHLORIDE 9 MG/ML
20 INJECTION, SOLUTION INTRAVENOUS ONCE
Status: CANCELLED | OUTPATIENT
Start: 2025-04-23

## 2025-04-23 RX ORDER — CETIRIZINE HYDROCHLORIDE 10 MG/1
10 TABLET ORAL ONCE
Status: CANCELLED | OUTPATIENT
Start: 2025-04-23

## 2025-04-23 RX ORDER — FAMOTIDINE 10 MG/ML
20 INJECTION, SOLUTION INTRAVENOUS AS NEEDED
OUTPATIENT
Start: 2025-04-23

## 2025-04-23 RX ORDER — CETIRIZINE HYDROCHLORIDE 10 MG/1
10 TABLET ORAL ONCE
Status: DISCONTINUED | OUTPATIENT
Start: 2025-04-23 | End: 2025-04-23 | Stop reason: HOSPADM

## 2025-04-23 RX ORDER — DIPHENHYDRAMINE HYDROCHLORIDE 50 MG/ML
50 INJECTION, SOLUTION INTRAMUSCULAR; INTRAVENOUS AS NEEDED
OUTPATIENT
Start: 2025-04-23

## 2025-04-23 RX ORDER — SODIUM CHLORIDE 9 MG/ML
20 INJECTION, SOLUTION INTRAVENOUS ONCE
Status: DISCONTINUED | OUTPATIENT
Start: 2025-04-23 | End: 2025-04-23 | Stop reason: HOSPADM

## 2025-04-23 RX ADMIN — IRON SUCROSE 300 MG: 20 INJECTION, SOLUTION INTRAVENOUS at 09:19

## 2025-04-24 ENCOUNTER — TELEPHONE (OUTPATIENT)
Dept: SURGERY | Facility: CLINIC | Age: 30
End: 2025-04-24
Payer: COMMERCIAL

## 2025-04-24 ENCOUNTER — PATIENT ROUNDING (BHMG ONLY) (OUTPATIENT)
Dept: BARIATRICS/WEIGHT MGMT | Facility: CLINIC | Age: 30
End: 2025-04-24
Payer: COMMERCIAL

## 2025-04-24 ENCOUNTER — OFFICE VISIT (OUTPATIENT)
Dept: BARIATRICS/WEIGHT MGMT | Facility: CLINIC | Age: 30
End: 2025-04-24
Payer: COMMERCIAL

## 2025-04-24 ENCOUNTER — NUTRITION (OUTPATIENT)
Dept: BARIATRICS/WEIGHT MGMT | Facility: CLINIC | Age: 30
End: 2025-04-24
Payer: COMMERCIAL

## 2025-04-24 VITALS
HEIGHT: 66 IN | SYSTOLIC BLOOD PRESSURE: 116 MMHG | HEART RATE: 91 BPM | DIASTOLIC BLOOD PRESSURE: 81 MMHG | WEIGHT: 266.1 LBS | BODY MASS INDEX: 42.77 KG/M2 | OXYGEN SATURATION: 98 % | TEMPERATURE: 98.4 F

## 2025-04-24 DIAGNOSIS — E66.813 CLASS 3 SEVERE OBESITY DUE TO EXCESS CALORIES WITH SERIOUS COMORBIDITY AND BODY MASS INDEX (BMI) OF 40.0 TO 44.9 IN ADULT: Primary | ICD-10-CM

## 2025-04-24 DIAGNOSIS — E66.01 CLASS 3 SEVERE OBESITY DUE TO EXCESS CALORIES WITH SERIOUS COMORBIDITY AND BODY MASS INDEX (BMI) OF 40.0 TO 44.9 IN ADULT: Primary | ICD-10-CM

## 2025-04-24 RX ORDER — CYCLOBENZAPRINE HCL 10 MG
TABLET ORAL
COMMUNITY
Start: 2025-04-03

## 2025-04-24 NOTE — TELEPHONE ENCOUNTER
Called Tim to confirm her surgery date 04/25/2025 with an arrival time of 5:00AM.   Reminded her not to eat or drink after midnight.   Let her know to come through the main entrance of the hospital and check in at main registration.      Left message with patient.

## 2025-04-24 NOTE — PROGRESS NOTES
"Metabolic and Bariatric Surgery Adult Nutrition Assessment    Patient Name: Tim Saenz   YOB: 1995   MRN: [id number]     Assessment Date:  04/24/2025     Reason for Visit: New Patient Nutrition Education Class    Treatment Pathway: Preoperative Bariatric Surgery    Assessment    Anthropometrics   Wt Readings from Last 1 Encounters:   04/24/25 121 kg (266 lb 1.6 oz)     Ht Readings from Last 1 Encounters:   04/24/25 167 cm (65.75\")     BMI Readings from Last 1 Encounters:   04/24/25 43.28 kg/m²        Initial Weight/Date: 266.1 lbs (Apr 2025)    Past Medical History:   Diagnosis Date    Back pain     Chronic pain     Depression     Diabetes mellitus, type 2     Eczema     History of stomach ulcers     GLORIA (iron deficiency anemia)     Pneumonia     PONV (postoperative nausea and vomiting)     Social anxiety disorder     UTI (urinary tract infection)       Past Surgical History:   Procedure Laterality Date    CARPAL TUNNEL RELEASE Bilateral     COLONOSCOPY      EAR TUBES      ENDOSCOPY      MYOMECTOMY N/A 3/11/2024    Procedure: MYOMECTOMY LAPAROSCOPIC WITH ON-S [employer] ROBOT;  Surgeon: Elmira Avitia MD;  Location: [facility];  Service: Robotics - [employer];  Laterality: N/A;    WISDOM TOOTH EXTRACTION      x2      Current Outpatient Medications   Medication Sig Dispense Refill    azelastine (ASTELIN) 0.1 % nasal spray Administer 2 sprays into the nostril(s) as directed by provider 2 (Two) Times a Day. Use in each nostril as directed 30 mL 2    buPROPion XL (WELLBUTRIN XL) 150 MG 24 hr tablet Take 1 tablet every day by oral route in the morning for 90 days.      busPIRone (BUSPAR) 5 MG tablet Take 1 tablet by mouth 3 (Three) Times a Day. Pt only takes bid      cetirizine (zyrTEC) 10 MG tablet Take 1 tablet by mouth Daily. (Patient not taking: Reported on 4/24/2025) 30 tablet 2    citalopram (CeleXA) 20 MG tablet  (Patient not taking: Reported on 4/24/2025)      [employer] ASHWAGANDHA PO Take  by mouth As Needed. " Gummies      cyclobenzaprine (FLEXERIL) 10 MG tablet       docusate sodium 100 MG capsule Take 1 capsule by mouth As Needed (constipation). (Patient not taking: Reported on 4/24/2025)      fluticasone (FLONASE) 50 MCG/ACT nasal spray Administer 2 sprays into the nostril(s) as directed by provider Daily. 16 g 2    FREESTYLE LITE test strip       Lancets (freestyle) lancets       Magnesium 250 MG tablet Take  by mouth As Needed.      ondansetron (ZOFRAN) 8 MG tablet Take 1 tablet by mouth Every 8 (Eight) Hours As Needed for Nausea.      Ozempic, 1 MG/DOSE, 4 MG/3ML solution pen-injector Inject 1 mg under the skin into the appropriate area as directed 1 (One) Time Per Week. (Patient not taking: Reported on 4/24/2025)      rosuvastatin (CRESTOR) 10 MG tablet Take 0.5 tablets by mouth Daily. (Patient not taking: Reported on 4/24/2025)      Semaglutide, 2 MG/DOSE, (Ozempic, 2 MG/DOSE,) 8 MG/3ML solution pen-injector Inject 2 mg under the skin into the appropriate area as directed 1 (One) Time Per Week. (Patient not taking: Reported on 4/24/2025)      vitamin D (ERGOCALCIFEROL) 1.25 MG (88653 UT) capsule capsule TAKE 1 CAPSULE BY MOUTH ONCE WEEKLY 5 capsule 1     No current facility-administered medications for this visit.      Allergies   Allergen Reactions    Latex Itching    Naproxen GI Intolerance     Stomach ulcers          Nutrition Intervention  Nutrition education and nutrition coaching for behavior change provided.  Strategies used included Comprehensive education & skill development for measuring portions, reading food labels, calculating protein, meal planning, understanding appropriate drink choices, and evaluating condiments, seasonings, and cooking methods.   Review of medical weight loss prescription plan and reviewed nutritional needs for Preoperative Bariatric Surgery.  Self-monitoring strategies such as keeping a food journal (on paper or electronically) were discussed.  Recommended increasing physical  activity, beyond normal daily habits, gradually working to reach ~30 minutes daily.     Recommended Diet Changes- Green Prescription Meal Plan. Provided Sample Menus  Eat 4 meals per day with protein and vegetables at each meal; reduce/eliminate Group C carbohydrates after meal 2., Protein goal: 65 gms., Eat vegetables first at each meal., Discussed protein guidelines for shakes and bars., Reduce snacking -use foods from free foods list only., Reduce fat, sugar, and/or salt in food choices., Choose more nutrient dense foods., Choose foods with increased fiber., Monitor portion sizes using a food scale and/or measuring cup., Eliminate soda and sugar-sweetened beverages, and Increase fluid intake to 64 ounces per day       Monitoring/Evaluation Plan  Anticipate follow up in one month. Continue collaboration of care with physician and treatment team.     Time Spent 30 minutes    Electronically signed by  Danielle Fam RDN, LD  04/24/2025 14:54 CDT.

## 2025-04-24 NOTE — PROGRESS NOTES
Patient Care Team:  Kendra Rosa APRN as PCP - General (Nurse Practitioner)      Subjective     History of Present Illness  The patient is a 29-year-old female who presents today to discuss nutritional counseling and bariatric surgery.    She reached her peak weight of 298 pounds at the age of 26. Motivated by her grandfather's declining health, she embarked on a weight loss journey, reducing her weight to 202 pounds. However, this was achieved through an unhealthy obsession with the scale. Prior to her laparoscopic myomectomy in March 2024, she weighed 222 pounds. Post-surgery, while caring for her grandmother, she experienced depression and regained the weight. She has not been diagnosed with any eating disorders. She has undergone a sleep study, which did not indicate a need for a CPAP machine. She also had an EKG as part of her preoperative testing last year. She has previously attempted high-protein, low-carb, keto, and fasting diets. She has also tried medications such as Wellbutrin, Ozempic, and Mounjaro.    She is scheduled for umbilical hernia surgery with Dr. Staton tomorrow. She has no history of hiatal hernia repair or esophageal issues. She reports no diagnosis of gastroparesis or Mancuso's esophagus.    MEDICATIONS  Past: Wellbutrin, Ozempic, Mounjaro        History  Past Medical History:   Diagnosis Date    Back pain     Chronic pain     Depression     Diabetes mellitus, type 2     Eczema     History of stomach ulcers     GLORIA (iron deficiency anemia)     Pneumonia     PONV (postoperative nausea and vomiting)     Social anxiety disorder     UTI (urinary tract infection)       Past Surgical History:   Procedure Laterality Date    CARPAL TUNNEL RELEASE Bilateral     COLONOSCOPY      EAR TUBES      ENDOSCOPY      MYOMECTOMY N/A 3/11/2024    Procedure: MYOMECTOMY LAPAROSCOPIC WITH DAVINCI ROBOT;  Surgeon: Elmira Avitia MD;  Location: [facility];  Service: Robotics - DaVinci;  Laterality: N/A;     VENTRAL HERNIA REPAIR N/A 4/25/2025    Procedure: RIGHT ABDOMINAL WALL INCISIONAL HERNIA AND UMBILICAL HERNIA REPAIR LAPAROSCOPIC WITH DAVINCI ROBOT WITH MESH;  Surgeon: Nicolette Staton MD;  Location: [facility] OR;  Service: Robotics - DaVinci;  Laterality: N/A;    WISDOM TOOTH EXTRACTION      x2      Social History     Socioeconomic History    Marital status: Single   Tobacco Use    Smoking status: Never     Passive exposure: Never    Smokeless tobacco: Never   Vaping Use    Vaping status: Never Used   Substance and Sexual Activity    Alcohol use: Not Currently    Drug use: Never    Sexual activity: Never     Birth control/protection: None      Family History   Problem Relation Age of Onset    Uterine cancer Mother 29    Breast cancer Paternal Grandmother     Breast cancer Maternal Aunt       Allergies   Allergen Reactions    Latex Itching    Naproxen GI Intolerance     Stomach ulcers          Current Outpatient Medications:     azelastine (ASTELIN) 0.1 % nasal spray, Administer 2 sprays into the nostril(s) as directed by provider 2 (Two) Times a Day. Use in each nostril as directed, Disp: 30 mL, Rfl: 2    buPROPion XL (WELLBUTRIN XL) 150 MG 24 hr tablet, Take 1 tablet every day by oral route in the morning for 90 days., Disp: , Rfl:     busPIRone (BUSPAR) 5 MG tablet, Take 1 tablet by mouth 3 (Three) Times a Day. Pt only takes bid, Disp: , Rfl:     [employer] ASHWAGANDHA PO, Take  by mouth As Needed. Gummies, Disp: , Rfl:     cyclobenzaprine (FLEXERIL) 10 MG tablet, , Disp: , Rfl:     fluticasone (FLONASE) 50 MCG/ACT nasal spray, Administer 2 sprays into the nostril(s) as directed by provider Daily., Disp: 16 g, Rfl: 2    FREESTYLE LITE test strip, , Disp: , Rfl:     Lancets (freestyle) lancets, , Disp: , Rfl:     Magnesium 250 MG tablet, Take  by mouth As Needed., Disp: , Rfl:     ondansetron (ZOFRAN) 8 MG tablet, Take 1 tablet by mouth Every 8 (Eight) Hours As Needed for Nausea., Disp: , Rfl:     vitamin D  (ERGOCALCIFEROL) 1.25 MG (80910 UT) capsule capsule, TAKE 1 CAPSULE BY MOUTH ONCE WEEKLY, Disp: 5 capsule, Rfl: 1    acetaminophen (Tylenol) 325 MG tablet, Take 3 tablets by mouth Every 8 (Eight) Hours. Take every 8 hours for 3 days then take prn as needed., Disp: , Rfl:     cetirizine (zyrTEC) 10 MG tablet, Take 1 tablet by mouth Daily. (Patient not taking: Reported on 4/24/2025), Disp: 30 tablet, Rfl: 2    citalopram (CeleXA) 20 MG tablet, , Disp: , Rfl:     docusate sodium (Colace) 100 MG capsule, Take 1 capsule by mouth 2 (Two) Times a Day for 30 days., Disp: 60 capsule, Rfl: 0    docusate sodium 100 MG capsule, Take 1 capsule by mouth As Needed (constipation). (Patient not taking: Reported on 4/24/2025), Disp: , Rfl:     ibuprofen (Motrin IB) 200 MG tablet, Take 3 tablets by mouth Every 8 (Eight) Hours. Take every 8 hours for three days then take as needed., Disp: , Rfl:     ondansetron (Zofran) 4 MG tablet, Take 1 tablet by mouth Every 8 (Eight) Hours As Needed for Nausea or Vomiting., Disp: 15 tablet, Rfl: 0    oxyCODONE (Roxicodone) 5 MG immediate release tablet, Take 1 tablet by mouth Every 8 (Eight) Hours As Needed for Severe Pain., Disp: 10 tablet, Rfl: 0    Ozempic, 1 MG/DOSE, 4 MG/3ML solution pen-injector, Inject 1 mg under the skin into the appropriate area as directed 1 (One) Time Per Week. (Patient not taking: Reported on 4/10/2025), Disp: , Rfl:     polyethylene glycol (MIRALAX) 17 g packet, Take 17 g by mouth Daily for 14 days., Disp: 14 packet, Rfl: 0    rosuvastatin (CRESTOR) 10 MG tablet, Take 0.5 tablets by mouth Daily. (Patient not taking: Reported on 4/24/2025), Disp: , Rfl:     Semaglutide, 2 MG/DOSE, (Ozempic, 2 MG/DOSE,) 8 MG/3ML solution pen-injector, Inject 2 mg under the skin into the appropriate area as directed 1 (One) Time Per Week. (Patient not taking: Reported on 4/24/2025), Disp: , Rfl:     Objective     Vital Signs     Body mass index is 43.28 kg/m².      04/24/25  0930    Weight: 121 kg (266 lb 1.6 oz)     Waist Circumference: 54.5  Hip Circunference: 52      Physical Exam  Vitals reviewed.   Constitutional:       Appearance: She is obese.   Cardiovascular:      Rate and Rhythm: Normal rate and regular rhythm.   Pulmonary:      Effort: Pulmonary effort is normal.   Musculoskeletal:         General: Normal range of motion.   Skin:     General: Skin is warm and dry.   Neurological:      Mental Status: She is alert and oriented to person, place, and time.   Psychiatric:         Mood and Affect: Mood normal.         Behavior: Behavior normal.          Physical Exam        Results  Testing  Sleep study negative for sleep apnea.    I reviewed the patient's new clinical results.      Assessment & Plan   Diagnoses and all orders for this visit:    1. Class 3 severe obesity due to excess calories with serious comorbidity and body mass index (BMI) of 40.0 to 44.9 in adult (Primary)  Assessment & Plan:  Patient's (Body mass index is 43.28 kg/m².) indicates that they are morbidly/severely obese (BMI > 40 or > 35 with obesity - related health condition) with health conditions that include none . Weight is improving with treatment. BMI  is above average; BMI management plan is completed. We discussed portion control and increasing exercise.           Assessment & Plan  1. Obesity.  Her insurance requires six consecutive visits and additional testing. She has previously tried high-protein, low-carb, keto diets, fasting, and medications such as Wellbutrin, Ozempic, and Mounjaro. There are no red flags to proceed with bariatric surgery. A sleeve gastrectomy is planned. She needs to watch the seminar on [employer] and take the quiz. She is advised to attend at least one monthly support group, preferably more. An EKG will be repeated as part of the preoperative testing.    2. Umbilical hernia.  She is scheduled for umbilical hernia surgery with Dr. Staton tomorrow. Weight loss is recommended to  lower the chances of recurrence. She is advised to follow the meal plan to help with weight reduction.    PROCEDURE  The patient underwent a laparoscopic myomectomy in March 2024.    I have also recommended that she obtain a cardiac risk assessment and psychiatric evaluation as well as any other preoperative requirements prior to surgery consideration.      Patient is a 29 y.o. female who has morbid obesity with Body mass index is 43.28 kg/m². and desires surgical weight loss. Patient has been advised that this surgery is considered to be elective major surgery that is typically done laparoscopically. Patient is a potentially good surgical candidate. Patient will need to meet with the Bariatric Surgeon to further discuss surgical options. Patient has been advised that they will need to have a work-up prior to surgery. This work-up will include but is not limited to an EKG, an EGD to assess for H. Pylori and Mancuso's esophagus, and a psychological evaluation, with additional testing as necessary. Pre-op testing will be ordered at next visit. Today the patient received the 4 meals/day diet prescription, which was explained to patient.  Patient will see the dietitian today to further discuss goals for diet, exercise, and lifestyle. Patient has received intensive behavioral therapy for obesity today. I explained the pathophysiology of the disease and its storage component. We also discussed Dr. Varela' pearls of the program. Nutrition counseling ordered today.     Current comorbid condition associated with she morbid obesity is reported to be stable on she current treatment regimen and medications. We anticipate the comorbid condition to improve as we address she morbid obesity.       Pre-op testing:     Seminar - Completed  EGD - Needed, but not yet ordered  H. Pylori - Will be done with EGD   H. Pylori Stool -  N/A    Psychological Evaluation - Needed, but not yet ordered    EKG - Needed, but not yet ordered     Cardiology - If EKG abnormal, cardiology will be ordered     TSH - Needed, but not yet ordered  Nicotine - N/A    Pulmonary Clearance - N/A   Drug Tests - N/A    Dietitian - Completed      JEFF Valles   10:30 CDT  04/30/25    A total of 30 minutes was spent face to face with this patient and over half of the time was spent on counseling and coordination of care for the disease of obesity. We specifically reviewed the dietary prescription and I made recommendations toward increasing exercise as tolerated as well as focusing on training their behavior toward storing less.  Patient or patient representative verbalized consent for the use of Ambient Listening during the visit with  JEFF Valles for chart documentation. 4/30/2025  10:29 CDT

## 2025-04-25 ENCOUNTER — HOSPITAL ENCOUNTER (OUTPATIENT)
Facility: HOSPITAL | Age: 30
Setting detail: HOSPITAL OUTPATIENT SURGERY
Discharge: HOME | End: 2025-04-25
Attending: STUDENT IN AN ORGANIZED HEALTH CARE EDUCATION/TRAINING PROGRAM | Admitting: STUDENT IN AN ORGANIZED HEALTH CARE EDUCATION/TRAINING PROGRAM
Payer: COMMERCIAL

## 2025-04-25 ENCOUNTER — ANESTHESIA EVENT (OUTPATIENT)
Dept: PERIOP | Facility: HOSPITAL | Age: 30
End: 2025-04-25
Payer: COMMERCIAL

## 2025-04-25 ENCOUNTER — ANESTHESIA (OUTPATIENT)
Dept: PERIOP | Facility: HOSPITAL | Age: 30
End: 2025-04-25
Payer: COMMERCIAL

## 2025-04-25 VITALS
WEIGHT: 265.65 LBS | BODY MASS INDEX: 42.69 KG/M2 | DIASTOLIC BLOOD PRESSURE: 88 MMHG | RESPIRATION RATE: 16 BRPM | HEART RATE: 99 BPM | OXYGEN SATURATION: 96 % | SYSTOLIC BLOOD PRESSURE: 137 MMHG | HEIGHT: 66 IN | TEMPERATURE: 99.6 F

## 2025-04-25 DIAGNOSIS — K42.9 UMBILICAL HERNIA WITHOUT OBSTRUCTION AND WITHOUT GANGRENE: ICD-10-CM

## 2025-04-25 DIAGNOSIS — K43.2 INCISIONAL HERNIA, WITHOUT OBSTRUCTION OR GANGRENE: ICD-10-CM

## 2025-04-25 LAB
B-HCG UR QL: NEGATIVE
GLUCOSE BLDC GLUCOMTR-MCNC: 117 MG/DL (ref 70–130)
GLUCOSE BLDC GLUCOMTR-MCNC: 92 MG/DL (ref 70–130)

## 2025-04-25 PROCEDURE — 25010000002 FENTANYL CITRATE (PF) 50 MCG/ML SOLUTION: Performed by: ANESTHESIOLOGY

## 2025-04-25 PROCEDURE — 25010000002 ENOXAPARIN PER 10 MG: Performed by: STUDENT IN AN ORGANIZED HEALTH CARE EDUCATION/TRAINING PROGRAM

## 2025-04-25 PROCEDURE — 25010000002 LIDOCAINE 1% - EPINEPHRINE 1:100000 1 %-1:100000 SOLUTION 20 ML VIAL: Performed by: STUDENT IN AN ORGANIZED HEALTH CARE EDUCATION/TRAINING PROGRAM

## 2025-04-25 PROCEDURE — 25010000002 SUGAMMADEX 200 MG/2ML SOLUTION: Performed by: NURSE ANESTHETIST, CERTIFIED REGISTERED

## 2025-04-25 PROCEDURE — 25010000002 DEXAMETHASONE PER 1 MG: Performed by: STUDENT IN AN ORGANIZED HEALTH CARE EDUCATION/TRAINING PROGRAM

## 2025-04-25 PROCEDURE — 25010000002 DEXAMETHASONE PER 1 MG: Performed by: ANESTHESIOLOGY

## 2025-04-25 PROCEDURE — 81025 URINE PREGNANCY TEST: CPT | Performed by: STUDENT IN AN ORGANIZED HEALTH CARE EDUCATION/TRAINING PROGRAM

## 2025-04-25 PROCEDURE — 25010000002 BUPIVACAINE 0.5 % SOLUTION 50 ML VIAL: Performed by: STUDENT IN AN ORGANIZED HEALTH CARE EDUCATION/TRAINING PROGRAM

## 2025-04-25 PROCEDURE — S2900 ROBOTIC SURGICAL SYSTEM: HCPCS | Performed by: STUDENT IN AN ORGANIZED HEALTH CARE EDUCATION/TRAINING PROGRAM

## 2025-04-25 PROCEDURE — 25010000002 ONDANSETRON PER 1 MG: Performed by: ANESTHESIOLOGY

## 2025-04-25 PROCEDURE — 82948 REAGENT STRIP/BLOOD GLUCOSE: CPT

## 2025-04-25 PROCEDURE — 25010000002 BUPIVACAINE (PF) 0.25 % SOLUTION 30 ML VIAL: Performed by: STUDENT IN AN ORGANIZED HEALTH CARE EDUCATION/TRAINING PROGRAM

## 2025-04-25 PROCEDURE — 25010000002 HYDROMORPHONE 1 MG/ML SOLUTION: Performed by: NURSE ANESTHETIST, CERTIFIED REGISTERED

## 2025-04-25 PROCEDURE — 25010000002 PROPOFOL 10 MG/ML EMULSION: Performed by: NURSE ANESTHETIST, CERTIFIED REGISTERED

## 2025-04-25 PROCEDURE — 25010000002 LIDOCAINE PF 2% 2 % SOLUTION: Performed by: NURSE ANESTHETIST, CERTIFIED REGISTERED

## 2025-04-25 PROCEDURE — 25010000002 HYDROMORPHONE PER 4 MG: Performed by: ANESTHESIOLOGY

## 2025-04-25 PROCEDURE — 25010000002 MORPHINE SULFATE (PF) 2 MG/ML SOLUTION 1 ML CARTRIDGE: Performed by: STUDENT IN AN ORGANIZED HEALTH CARE EDUCATION/TRAINING PROGRAM

## 2025-04-25 PROCEDURE — 25010000002 ONDANSETRON PER 1 MG: Performed by: NURSE ANESTHETIST, CERTIFIED REGISTERED

## 2025-04-25 PROCEDURE — C1781 MESH (IMPLANTABLE): HCPCS | Performed by: STUDENT IN AN ORGANIZED HEALTH CARE EDUCATION/TRAINING PROGRAM

## 2025-04-25 PROCEDURE — 25010000002 LIDOCAINE 1 % SOLUTION 20 ML VIAL: Performed by: STUDENT IN AN ORGANIZED HEALTH CARE EDUCATION/TRAINING PROGRAM

## 2025-04-25 PROCEDURE — 25810000003 SODIUM CHLORIDE PER 500 ML: Performed by: STUDENT IN AN ORGANIZED HEALTH CARE EDUCATION/TRAINING PROGRAM

## 2025-04-25 PROCEDURE — 25010000002 DEXAMETHASONE PER 1 MG: Performed by: NURSE ANESTHETIST, CERTIFIED REGISTERED

## 2025-04-25 PROCEDURE — 25810000003 LACTATED RINGERS PER 1000 ML: Performed by: STUDENT IN AN ORGANIZED HEALTH CARE EDUCATION/TRAINING PROGRAM

## 2025-04-25 PROCEDURE — 25010000002 FENTANYL CITRATE (PF) 100 MCG/2ML SOLUTION: Performed by: NURSE ANESTHETIST, CERTIFIED REGISTERED

## 2025-04-25 PROCEDURE — 25010000002 CEFAZOLIN PER 500 MG: Performed by: STUDENT IN AN ORGANIZED HEALTH CARE EDUCATION/TRAINING PROGRAM

## 2025-04-25 PROCEDURE — 49594 RPR AA HRN 1ST 3-10 NCR/STRN: CPT | Performed by: STUDENT IN AN ORGANIZED HEALTH CARE EDUCATION/TRAINING PROGRAM

## 2025-04-25 DEVICE — DEV CONTRL TISS STRATAFIX SPIRAL MNCRYL PLS SH 3/0 9IN UD: Type: IMPLANTABLE DEVICE | Site: ABDOMEN | Status: FUNCTIONAL

## 2025-04-25 DEVICE — DEV WND/CLS CONTRL TISS STRATAFIX SYMM PDS PLS SZ0 45CM VIL: Type: IMPLANTABLE DEVICE | Site: ABDOMEN | Status: FUNCTIONAL

## 2025-04-25 DEVICE — DEV CONTRL TISS STRATAFIX SYMM PDS PLUS VIL CT-1 45CM: Type: IMPLANTABLE DEVICE | Site: ABDOMEN | Status: FUNCTIONAL

## 2025-04-25 DEVICE — VENTRALIGHT ST MESH, 6" X 8" (15.2 CM X 20.3 CM), ELLIPSE
Type: IMPLANTABLE DEVICE | Site: ABDOMEN | Status: FUNCTIONAL
Brand: VENTRALIGHT

## 2025-04-25 RX ORDER — LIDOCAINE HYDROCHLORIDE 20 MG/ML
INJECTION, SOLUTION EPIDURAL; INFILTRATION; INTRACAUDAL; PERINEURAL AS NEEDED
Status: DISCONTINUED | OUTPATIENT
Start: 2025-04-25 | End: 2025-04-25 | Stop reason: SURG

## 2025-04-25 RX ORDER — ONDANSETRON 2 MG/ML
4 INJECTION INTRAMUSCULAR; INTRAVENOUS
Status: DISCONTINUED | OUTPATIENT
Start: 2025-04-25 | End: 2025-04-25 | Stop reason: HOSPADM

## 2025-04-25 RX ORDER — SODIUM CHLORIDE, SODIUM LACTATE, POTASSIUM CHLORIDE, CALCIUM CHLORIDE 600; 310; 30; 20 MG/100ML; MG/100ML; MG/100ML; MG/100ML
100 INJECTION, SOLUTION INTRAVENOUS CONTINUOUS
Status: DISCONTINUED | OUTPATIENT
Start: 2025-04-25 | End: 2025-04-25 | Stop reason: HOSPADM

## 2025-04-25 RX ORDER — HYDROMORPHONE HYDROCHLORIDE 1 MG/ML
0.5 INJECTION, SOLUTION INTRAMUSCULAR; INTRAVENOUS; SUBCUTANEOUS
Status: DISCONTINUED | OUTPATIENT
Start: 2025-04-25 | End: 2025-04-25 | Stop reason: HOSPADM

## 2025-04-25 RX ORDER — MIDAZOLAM HYDROCHLORIDE 2 MG/2ML
2 INJECTION, SOLUTION INTRAMUSCULAR; INTRAVENOUS ONCE
Status: DISCONTINUED | OUTPATIENT
Start: 2025-04-25 | End: 2025-04-25 | Stop reason: HOSPADM

## 2025-04-25 RX ORDER — DEXTROSE MONOHYDRATE 25 G/50ML
12.5 INJECTION, SOLUTION INTRAVENOUS AS NEEDED
Status: DISCONTINUED | OUTPATIENT
Start: 2025-04-25 | End: 2025-04-25 | Stop reason: HOSPADM

## 2025-04-25 RX ORDER — LABETALOL HYDROCHLORIDE 5 MG/ML
5 INJECTION, SOLUTION INTRAVENOUS
Status: DISCONTINUED | OUTPATIENT
Start: 2025-04-25 | End: 2025-04-25 | Stop reason: HOSPADM

## 2025-04-25 RX ORDER — ACETAMINOPHEN 325 MG/1
975 TABLET ORAL EVERY 8 HOURS
Start: 2025-04-25 | End: 2026-04-25

## 2025-04-25 RX ORDER — SODIUM CHLORIDE 0.9 % (FLUSH) 0.9 %
10 SYRINGE (ML) INJECTION AS NEEDED
Status: DISCONTINUED | OUTPATIENT
Start: 2025-04-25 | End: 2025-04-25 | Stop reason: HOSPADM

## 2025-04-25 RX ORDER — ONDANSETRON 4 MG/1
4 TABLET, FILM COATED ORAL EVERY 8 HOURS PRN
Qty: 15 TABLET | Refills: 0 | Status: SHIPPED | OUTPATIENT
Start: 2025-04-25 | End: 2026-04-25

## 2025-04-25 RX ORDER — FLUMAZENIL 0.1 MG/ML
0.2 INJECTION INTRAVENOUS AS NEEDED
Status: DISCONTINUED | OUTPATIENT
Start: 2025-04-25 | End: 2025-04-25 | Stop reason: HOSPADM

## 2025-04-25 RX ORDER — PROPOFOL 10 MG/ML
VIAL (ML) INTRAVENOUS AS NEEDED
Status: DISCONTINUED | OUTPATIENT
Start: 2025-04-25 | End: 2025-04-25 | Stop reason: SURG

## 2025-04-25 RX ORDER — NALOXONE HCL 0.4 MG/ML
0.04 VIAL (ML) INJECTION AS NEEDED
Status: DISCONTINUED | OUTPATIENT
Start: 2025-04-25 | End: 2025-04-25 | Stop reason: HOSPADM

## 2025-04-25 RX ORDER — DEXAMETHASONE SODIUM PHOSPHATE 4 MG/ML
4 INJECTION, SOLUTION INTRA-ARTICULAR; INTRALESIONAL; INTRAMUSCULAR; INTRAVENOUS; SOFT TISSUE ONCE AS NEEDED
Status: COMPLETED | OUTPATIENT
Start: 2025-04-25 | End: 2025-04-25

## 2025-04-25 RX ORDER — LIDOCAINE HYDROCHLORIDE 10 MG/ML
0.5 INJECTION, SOLUTION EPIDURAL; INFILTRATION; INTRACAUDAL; PERINEURAL ONCE AS NEEDED
Status: DISCONTINUED | OUTPATIENT
Start: 2025-04-25 | End: 2025-04-25 | Stop reason: HOSPADM

## 2025-04-25 RX ORDER — OXYCODONE AND ACETAMINOPHEN 10; 325 MG/1; MG/1
1 TABLET ORAL EVERY 4 HOURS PRN
Status: DISCONTINUED | OUTPATIENT
Start: 2025-04-25 | End: 2025-04-25 | Stop reason: HOSPADM

## 2025-04-25 RX ORDER — POLYETHYLENE GLYCOL 3350 17 G/17G
17 POWDER, FOR SOLUTION ORAL DAILY
Qty: 14 PACKET | Refills: 0 | Status: SHIPPED | OUTPATIENT
Start: 2025-04-25 | End: 2025-05-09

## 2025-04-25 RX ORDER — SODIUM CHLORIDE 0.9 % (FLUSH) 0.9 %
3 SYRINGE (ML) INJECTION AS NEEDED
Status: DISCONTINUED | OUTPATIENT
Start: 2025-04-25 | End: 2025-04-25 | Stop reason: HOSPADM

## 2025-04-25 RX ORDER — SCOPOLAMINE 1 MG/3D
1 PATCH, EXTENDED RELEASE TRANSDERMAL ONCE
Status: DISCONTINUED | OUTPATIENT
Start: 2025-04-25 | End: 2025-04-25 | Stop reason: HOSPADM

## 2025-04-25 RX ORDER — FENTANYL CITRATE 50 UG/ML
50 INJECTION, SOLUTION INTRAMUSCULAR; INTRAVENOUS
Status: DISCONTINUED | OUTPATIENT
Start: 2025-04-25 | End: 2025-04-25 | Stop reason: HOSPADM

## 2025-04-25 RX ORDER — IBUPROFEN 600 MG/1
600 TABLET, FILM COATED ORAL EVERY 6 HOURS PRN
Status: DISCONTINUED | OUTPATIENT
Start: 2025-04-25 | End: 2025-04-25 | Stop reason: HOSPADM

## 2025-04-25 RX ORDER — ALBUTEROL SULFATE 90 UG/1
INHALANT RESPIRATORY (INHALATION) AS NEEDED
Status: DISCONTINUED | OUTPATIENT
Start: 2025-04-25 | End: 2025-04-25 | Stop reason: SURG

## 2025-04-25 RX ORDER — MIDAZOLAM HYDROCHLORIDE 2 MG/2ML
1 INJECTION, SOLUTION INTRAMUSCULAR; INTRAVENOUS
Status: DISCONTINUED | OUTPATIENT
Start: 2025-04-25 | End: 2025-04-25 | Stop reason: HOSPADM

## 2025-04-25 RX ORDER — FENTANYL CITRATE 50 UG/ML
25 INJECTION, SOLUTION INTRAMUSCULAR; INTRAVENOUS
Status: DISCONTINUED | OUTPATIENT
Start: 2025-04-25 | End: 2025-04-25 | Stop reason: HOSPADM

## 2025-04-25 RX ORDER — DOCUSATE SODIUM 100 MG/1
100 CAPSULE, LIQUID FILLED ORAL 2 TIMES DAILY
Qty: 60 CAPSULE | Refills: 0 | Status: SHIPPED | OUTPATIENT
Start: 2025-04-25 | End: 2025-05-25

## 2025-04-25 RX ORDER — ONDANSETRON 2 MG/ML
INJECTION INTRAMUSCULAR; INTRAVENOUS AS NEEDED
Status: DISCONTINUED | OUTPATIENT
Start: 2025-04-25 | End: 2025-04-25 | Stop reason: SURG

## 2025-04-25 RX ORDER — OXYCODONE HYDROCHLORIDE 5 MG/1
5 TABLET ORAL EVERY 8 HOURS PRN
Qty: 10 TABLET | Refills: 0 | Status: SHIPPED | OUTPATIENT
Start: 2025-04-25 | End: 2026-04-25

## 2025-04-25 RX ORDER — FENTANYL CITRATE 50 UG/ML
INJECTION, SOLUTION INTRAMUSCULAR; INTRAVENOUS AS NEEDED
Status: DISCONTINUED | OUTPATIENT
Start: 2025-04-25 | End: 2025-04-25 | Stop reason: SURG

## 2025-04-25 RX ORDER — DEXAMETHASONE SODIUM PHOSPHATE 4 MG/ML
INJECTION, SOLUTION INTRA-ARTICULAR; INTRALESIONAL; INTRAMUSCULAR; INTRAVENOUS; SOFT TISSUE AS NEEDED
Status: DISCONTINUED | OUTPATIENT
Start: 2025-04-25 | End: 2025-04-25 | Stop reason: SURG

## 2025-04-25 RX ORDER — SODIUM CHLORIDE 0.9 % (FLUSH) 0.9 %
10 SYRINGE (ML) INJECTION EVERY 12 HOURS SCHEDULED
Status: DISCONTINUED | OUTPATIENT
Start: 2025-04-25 | End: 2025-04-25 | Stop reason: HOSPADM

## 2025-04-25 RX ORDER — IBUPROFEN 200 MG
600 TABLET ORAL EVERY 8 HOURS
Start: 2025-04-25 | End: 2026-04-25

## 2025-04-25 RX ORDER — SODIUM CHLORIDE 9 MG/ML
INJECTION, SOLUTION INTRAVENOUS AS NEEDED
Status: DISCONTINUED | OUTPATIENT
Start: 2025-04-25 | End: 2025-04-25 | Stop reason: HOSPADM

## 2025-04-25 RX ORDER — ENOXAPARIN SODIUM 100 MG/ML
30 INJECTION SUBCUTANEOUS DAILY
Status: DISCONTINUED | OUTPATIENT
Start: 2025-04-25 | End: 2025-04-25 | Stop reason: HOSPADM

## 2025-04-25 RX ORDER — ROCURONIUM BROMIDE 10 MG/ML
INJECTION, SOLUTION INTRAVENOUS AS NEEDED
Status: DISCONTINUED | OUTPATIENT
Start: 2025-04-25 | End: 2025-04-25 | Stop reason: SURG

## 2025-04-25 RX ORDER — PHENYLEPHRINE HCL IN 0.9% NACL 1 MG/10 ML
SYRINGE (ML) INTRAVENOUS AS NEEDED
Status: DISCONTINUED | OUTPATIENT
Start: 2025-04-25 | End: 2025-04-25 | Stop reason: SURG

## 2025-04-25 RX ADMIN — DEXAMETHASONE SODIUM PHOSPHATE 4 MG: 4 INJECTION, SOLUTION INTRA-ARTICULAR; INTRALESIONAL; INTRAMUSCULAR; INTRAVENOUS; SOFT TISSUE at 06:45

## 2025-04-25 RX ADMIN — ALBUTEROL SULFATE 8 PUFF: 108 INHALANT RESPIRATORY (INHALATION) at 08:34

## 2025-04-25 RX ADMIN — PROPOFOL 200 MG: 10 INJECTION, EMULSION INTRAVENOUS at 06:58

## 2025-04-25 RX ADMIN — SODIUM CHLORIDE, POTASSIUM CHLORIDE, SODIUM LACTATE AND CALCIUM CHLORIDE 100 ML/HR: 600; 310; 30; 20 INJECTION, SOLUTION INTRAVENOUS at 06:19

## 2025-04-25 RX ADMIN — CEFAZOLIN 2000 MG: 2 INJECTION, POWDER, FOR SOLUTION INTRAMUSCULAR; INTRAVENOUS at 07:03

## 2025-04-25 RX ADMIN — Medication 100 MCG: at 08:12

## 2025-04-25 RX ADMIN — HYDROMORPHONE HYDROCHLORIDE 1 MG: 1 INJECTION, SOLUTION INTRAMUSCULAR; INTRAVENOUS; SUBCUTANEOUS at 08:26

## 2025-04-25 RX ADMIN — ROCURONIUM BROMIDE 50 MG: 10 INJECTION, SOLUTION INTRAVENOUS at 06:58

## 2025-04-25 RX ADMIN — HYDROMORPHONE HYDROCHLORIDE 0.5 MG: 1 INJECTION, SOLUTION INTRAMUSCULAR; INTRAVENOUS; SUBCUTANEOUS at 09:35

## 2025-04-25 RX ADMIN — ONDANSETRON 4 MG: 2 INJECTION INTRAMUSCULAR; INTRAVENOUS at 08:19

## 2025-04-25 RX ADMIN — FENTANYL CITRATE 50 MCG: 50 INJECTION, SOLUTION INTRAMUSCULAR; INTRAVENOUS at 10:00

## 2025-04-25 RX ADMIN — SCOPOLAMINE 1 PATCH: 1.5 PATCH, EXTENDED RELEASE TRANSDERMAL at 06:45

## 2025-04-25 RX ADMIN — FENTANYL CITRATE 100 MCG: 50 INJECTION, SOLUTION INTRAMUSCULAR; INTRAVENOUS at 07:12

## 2025-04-25 RX ADMIN — HYDROMORPHONE HYDROCHLORIDE 0.5 MG: 1 INJECTION, SOLUTION INTRAMUSCULAR; INTRAVENOUS; SUBCUTANEOUS at 09:55

## 2025-04-25 RX ADMIN — FENTANYL CITRATE 100 MCG: 50 INJECTION, SOLUTION INTRAMUSCULAR; INTRAVENOUS at 06:57

## 2025-04-25 RX ADMIN — ONDANSETRON 4 MG: 2 INJECTION INTRAMUSCULAR; INTRAVENOUS at 10:56

## 2025-04-25 RX ADMIN — ENOXAPARIN SODIUM 30 MG: 100 INJECTION SUBCUTANEOUS at 06:43

## 2025-04-25 RX ADMIN — LIDOCAINE HYDROCHLORIDE 100 MG: 20 INJECTION, SOLUTION EPIDURAL; INFILTRATION; INTRACAUDAL; PERINEURAL at 06:58

## 2025-04-25 RX ADMIN — DEXAMETHASONE SODIUM PHOSPHATE 4 MG: 4 INJECTION, SOLUTION INTRA-ARTICULAR; INTRALESIONAL; INTRAMUSCULAR; INTRAVENOUS; SOFT TISSUE at 07:03

## 2025-04-25 RX ADMIN — OXYCODONE AND ACETAMINOPHEN 1 TABLET: 325; 10 TABLET ORAL at 09:43

## 2025-04-25 RX ADMIN — SUGAMMADEX 200 MG: 100 INJECTION, SOLUTION INTRAVENOUS at 08:24

## 2025-04-25 NOTE — DISCHARGE INSTRUCTIONS
Wound:   - you have skin glue on your incisions. Okay to shower tomorrow.   - Leave skin glue in place, it should slowly fall off over 2 weeks   - No swimming/soaking/bathing x 2 weeks to allow incisions to heal.     Activity:   - Activity as tolerated. NO heavy lifting x 4 weeks - no more than 25lb at a time.   - No driving or operating machinery on narcotic pain medication.     Pain medication:   - Take 1000mg of tylenol every 8 hours for 3 days. After three days, take it prn.   - Take 600mg of ibuprofen (motrin) every 8 hours for 3 days. After three days, take it prn.   - You have a prescription for a narcotic. It will be roxicodone 5mg tabs. Take these only as needed after you have taken the tylenol and ibuprofen. If you are taking the roxicodone, make sure to take a stool softener (colace) with it as it can cause constipation.   - The narcotic may make you nauseated, you will have a prescription for zofran in case of nausea.     Follow up:   - make an appointment to see Beena Amato PA-C  in 2 weeks  - If you have any concerns before then, call me office at [phone]

## 2025-04-25 NOTE — ANESTHESIA POSTPROCEDURE EVALUATION
"Patient: Tim Saenz    Procedure Summary       Date: 04/25/25 Room / Location:  [facility] OR 06 /  [facility] OR    Anesthesia Start: 0656 Anesthesia Stop: 0843    Procedure: RIGHT ABDOMINAL WALL INCISIONAL HERNIA AND UMBILICAL HERNIA REPAIR LAPAROSCOPIC WITH DAVINCI ROBOT WITH MESH (Abdomen) Diagnosis:       Incisional hernia, without obstruction or gangrene      Umbilical hernia without obstruction and without gangrene      (Incisional hernia, without obstruction or gangrene [K43.2])      (Umbilical hernia without obstruction and without gangrene [K42.9])    Surgeons: Nicolette Staton MD Provider: Kofi Malik CRNA    Anesthesia Type: general ASA Status: 3            Anesthesia Type: general    Vitals  Vitals Value Taken Time   /81 04/25/25 11:31   Temp 99.6 °F (37.6 °C) 04/25/25 10:30   Pulse 90 04/25/25 11:43   Resp 16 04/25/25 10:41   SpO2 94 % 04/25/25 11:43   Vitals shown include unfiled device data.        Post Anesthesia Care and Evaluation    PONV Status: none  Comments: Patient d/c from PACU prior to anes eval based on Kelsea score.  Please see RN notes for details of d/c criteria.    Blood pressure 133/81, pulse 87, temperature 99.6 °F (37.6 °C), temperature source Temporal, resp. rate 16, height 167.1 cm (65.79\"), weight 121 kg (265 lb 10.5 oz), last menstrual period 04/14/2025, SpO2 94%, not currently breastfeeding.        "

## 2025-04-25 NOTE — ANESTHESIA PREPROCEDURE EVALUATION
Anesthesia Evaluation     Patient summary reviewed   history of anesthetic complications:  PONV  NPO Solid Status: > 8 hours             Airway   Mallampati: II  TM distance: >3 FB  Neck ROM: full  Dental      Pulmonary    (-) COPD, asthma, sleep apnea, not a smoker  Cardiovascular   Exercise tolerance: excellent (>7 METS)    (+) hyperlipidemia  (-) pacemaker, past MI, angina, cardiac stents      Neuro/Psych  (-) seizures, TIA, CVA  GI/Hepatic/Renal/Endo    (+) morbid obesity, diabetes mellitus  (-) GERD, liver disease, no renal disease    Musculoskeletal     Abdominal    Substance History      OB/GYN    (-)  Pregnant        Other   blood dyscrasia anemia,                 Anesthesia Plan    ASA 3     general     intravenous induction     Anesthetic plan, risks, benefits, and alternatives have been provided, discussed and informed consent has been obtained with: patient.    CODE STATUS:

## 2025-04-25 NOTE — ANESTHESIA PROCEDURE NOTES
Airway  Reason: elective    Date/Time: 4/25/2025 7:00 AM  End Time:4/25/2025 7:00 AM  Airway not difficult    General Information and Staff    Patient location during procedure: OR  CRNA/CAA: Kofi Malik CRNA  SRNA: Kofi Leon SRNA  Indications and Patient Condition  Indications for airway management: airway protection    Preoxygenated: yes  MILS maintained throughout    Mask difficulty assessment: 1 - vent by mask    Final Airway Details    Final airway type: endotracheal airway      Successful airway: ETT  Cuffed: yes   Successful intubation technique: direct laryngoscopy  Endotracheal tube insertion site: oral  Blade: Diane  Blade size: 3  ETT size (mm): 7.0  Cormack-Lehane Classification: grade I - full view of glottis  Placement verified by: chest auscultation and capnometry   Cuff volume (mL): 5  Measured from: gums  ETT/EBT to gums (cm): 20  Number of attempts at approach: 1  Assessment: lips, teeth, and gum same as pre-op and atraumatic intubation

## 2025-04-25 NOTE — OP NOTE
Laparoscopic Robotic-Assisted Umbilical Hernia Repair and Incisional Hernia Repair with Mesh:     Patient: Tim Saenz  MRN: [id number]    YOB: 1995  Age: 29 y.o.  Sex: female  Unit:  [facility] OR Room/Bed: [facility] OR/MAIN OR Location: [facility]    Admitting Physician: COY ROLDAN    Primary Care Physician: Kendra Rosa APRN             INDICATIONS: This is a 29 y.o. female who presents with an umbilical hernia and an incisional hernia (right abdomen) for robotic repair with mesh.      DATE OF OPERATION: 4/25/2025     Surgeons and Role:     * Coy Roldan MD - Primary  Beena Amato PA-C - assist     ANESTHESIA: General     PREOPERATIVE DIAGNOSIS: Incisional hernia, without obstruction or gangrene [K43.2]  Umbilical hernia without obstruction and without gangrene [K42.9]    POSTOPERATIVE DIAGNOSIS: Same  (1) 3.2 cm incarcerated umbilical hernia   (2) 3.8 cm right abdominal wall incisional hernia     PROCEDURES PERFORMED:    (1) Laparoscopic, robotic-assisted 3.2 cm umbilical hernia repair with mesh   (2) Laparoscopic, robotic-assisted 3.8 cm right abdominal wall incisional hernia     PROCEDURE DETAILS:   After informed consent was obtained, the patient was brought to the operating room. Preoperative antibiotics and subcutaneous heparin were administered. General anesthesia was induced. The abdomen was prepped with ChloraPrep and draped in a sterile fashion. A time-out was performed verifying the correct patient, procedure, and side. An 12 mm incision was made along the left subcostal margin. The Veress was inserted and the abdomen was insufflated to 15 mmHg. A 12 mm trocar was then inserted. A laparoscopic TAP block was performed with my deep local.  Two additional 8 mm trocars were placed on the left lateral abdominal wall.  The bed was flexed. We then docked the robot in the usual fashion from the left side. There were adhesions of the omentum to the anterior abdominal wall  "which were lysed sharply. The peritoneum was incised. The periumbilical hernia was identified. It was incarcerated with pre-peritoneal fat. This was reduced. The defect was closed with a #1 stratafix starting 4cm above and continuing 4 cm below the defect. The right abdominal wall incisional hernia was identified. It was incarcerated with small bowel. I lysed the adhesions sharply and reduced the bowel. The defect was closed with a #1 stratafix starting 4cm above and continuing 4 cm below the defect. A 6 x 8\" Ventralight mesh was placed into the abdomen with 0-Vicryl sutures at 12 o'clock and 6 o'clock.  It was positioned so that there was 4 cm of overlap of the mesh on all sides of the hernia defects. The 0-vicryl sutures were passed trans-fascial with the laparoscopic suture passer. The mesh was sewn into place circumferentially with 3-0 stratafix sutures.  The needles were removed from the abdomen and needle counts were correct. The robot was undocked, the trocars were removed, and the abdomen was desufflated.  The skin of all three incisions was closed with a 4-0 Monocryl suture. Skin glue was placed to all incisions. The patient was transported to PACU in stable condition.     Beena Amato PA-C was responsible for performing the following activities: Retraction, Suturing, Closing, Placing Dressing, and exchanging robotic instruments and passing sutures  and their skilled assistance was necessary for the success of this case.    Findings:   (1) 3.2 cm incarcerated umbilical hernia   (2) 3.8 cm right abdominal wall incisional hernia   Estimated Blood Loss:  20 mL   Complications: None apparent            Specimens: None      Disposition: PACU - hemodynamically stable.           Condition: stable    Nicolette Staton MD  02/24/2025      "

## 2025-04-30 ENCOUNTER — TELEPHONE (OUTPATIENT)
Dept: SURGERY | Facility: CLINIC | Age: 30
End: 2025-04-30
Payer: COMMERCIAL

## 2025-04-30 PROBLEM — E66.01 CLASS 3 SEVERE OBESITY DUE TO EXCESS CALORIES WITH SERIOUS COMORBIDITY AND BODY MASS INDEX (BMI) OF 40.0 TO 44.9 IN ADULT: Status: ACTIVE | Noted: 2025-04-30

## 2025-04-30 PROBLEM — E66.813 CLASS 3 SEVERE OBESITY DUE TO EXCESS CALORIES WITH SERIOUS COMORBIDITY AND BODY MASS INDEX (BMI) OF 40.0 TO 44.9 IN ADULT: Status: ACTIVE | Noted: 2025-04-30

## 2025-04-30 NOTE — ASSESSMENT & PLAN NOTE
Patient's (Body mass index is 43.28 kg/m².) indicates that they are morbidly/severely obese (BMI > 40 or > 35 with obesity - related health condition) with health conditions that include none . Weight is improving with treatment. BMI  is above average; BMI management plan is completed. We discussed portion control and increasing exercise.

## 2025-04-30 NOTE — TELEPHONE ENCOUNTER
Post OP phone call visit:    Type of surgery: Right abdominal wall incisional hernia and umbilical hernia repair laparoscopic with davinci robot with mesh.       Unable to reach patient. Left a message with call back phone number for any questions or concerns.     Pt called back. Feeling light headed. Pt taking Tylenol for pain. Pt ambulating. Eating/drinking fine. Incisions healing. If symptoms worsen, instructed patient to call the office.

## 2025-05-07 ENCOUNTER — TELEMEDICINE (OUTPATIENT)
Dept: BARIATRICS/WEIGHT MGMT | Facility: CLINIC | Age: 30
End: 2025-05-07
Payer: COMMERCIAL

## 2025-05-07 DIAGNOSIS — E66.813 CLASS 3 SEVERE OBESITY DUE TO EXCESS CALORIES WITH SERIOUS COMORBIDITY AND BODY MASS INDEX (BMI) OF 40.0 TO 44.9 IN ADULT: Primary | ICD-10-CM

## 2025-05-07 DIAGNOSIS — E66.01 CLASS 3 SEVERE OBESITY DUE TO EXCESS CALORIES WITH SERIOUS COMORBIDITY AND BODY MASS INDEX (BMI) OF 40.0 TO 44.9 IN ADULT: Primary | ICD-10-CM

## 2025-05-07 NOTE — PROGRESS NOTES
"Metabolic and Bariatric Surgery Adult Nutrition Assessment    Patient Name: Tim Saenz   YOB: 1995   MRN: [id number]     Assessment Date:  05/07/2025     You have chosen to receive care through a telehealth visit.  Do you consent to use a video/audio connection for your medical care today? Yes   Patient Location: in [place]  Provider Location: [facility]    Reason for Visit: Initial Nutrition Assessment     Treatment Pathway: Preoperative Bariatric Surgery    Assessment    Anthropometrics   Wt Readings from Last 1 Encounters:   04/25/25 121 kg (265 lb 10.5 oz)     Ht Readings from Last 1 Encounters:   04/25/25 167.1 cm (65.79\")     BMI Readings from Last 1 Encounters:   04/25/25 43.16 kg/m²      Initial Weight/Date: 266.1 lbs (Apr 2025)  Weight Changes since last visit: n/a video visit    Past Medical History:   Diagnosis Date    Back pain     Chronic pain     Depression     Diabetes mellitus, type 2     Eczema     History of stomach ulcers     GLORIA (iron deficiency anemia)     Pneumonia     PONV (postoperative nausea and vomiting)     Social anxiety disorder     UTI (urinary tract infection)       Past Surgical History:   Procedure Laterality Date    CARPAL TUNNEL RELEASE Bilateral     COLONOSCOPY      EAR TUBES      ENDOSCOPY      MYOMECTOMY N/A 3/11/2024    Procedure: MYOMECTOMY LAPAROSCOPIC WITH DAVINCI ROBOT;  Surgeon: Elmira Avitia MD;  Location: [facility];  Service: [employer] - [email or web address];  Laterality: N/A;    VENTRAL HERNIA REPAIR N/A 4/25/2025    Procedure: RIGHT ABDOMINAL WALL INCISIONAL HERNIA AND UMBILICAL HERNIA REPAIR LAPAROSCOPIC WITH DAVINCI ROBOT WITH MESH;  Surgeon: Nicolette Staton MD;  Location: [facility] OR;  Service: [employer] - [employer];  Laterality: N/A;    WISDOM TOOTH EXTRACTION      x2      Current Outpatient Medications   Medication Sig Dispense Refill    acetaminophen (Tylenol) 325 MG tablet Take 3 tablets by mouth Every 8 (Eight) Hours. Take every 8 hours for 3 " days then take prn as needed.      azelastine (ASTELIN) 0.1 % nasal spray Administer 2 sprays into the nostril(s) as directed by provider 2 (Two) Times a Day. Use in each nostril as directed 30 mL 2    buPROPion XL (WELLBUTRIN XL) 150 MG 24 hr tablet Take 1 tablet every day by oral route in the morning for 90 days.      busPIRone (BUSPAR) 5 MG tablet Take 1 tablet by mouth 3 (Three) Times a Day. Pt only takes bid      cetirizine (zyrTEC) 10 MG tablet Take 1 tablet by mouth Daily. (Patient not taking: Reported on 4/24/2025) 30 tablet 2    citalopram (CeleXA) 20 MG tablet  (Patient not taking: Reported on 4/24/2025)      [employer] ASHWAGANDHA PO Take  by mouth As Needed. Gummies      cyclobenzaprine (FLEXERIL) 10 MG tablet       docusate sodium (Colace) 100 MG capsule Take 1 capsule by mouth 2 (Two) Times a Day for 30 days. 60 capsule 0    docusate sodium 100 MG capsule Take 1 capsule by mouth As Needed (constipation). (Patient not taking: Reported on 4/24/2025)      fluticasone (FLONASE) 50 MCG/ACT nasal spray Administer 2 sprays into the nostril(s) as directed by provider Daily. 16 g 2    FREESTYLE LITE test strip       ibuprofen (Motrin IB) 200 MG tablet Take 3 tablets by mouth Every 8 (Eight) Hours. Take every 8 hours for three days then take as needed.      Lancets (freestyle) lancets       Magnesium 250 MG tablet Take  by mouth As Needed.      ondansetron (Zofran) 4 MG tablet Take 1 tablet by mouth Every 8 (Eight) Hours As Needed for Nausea or Vomiting. 15 tablet 0    ondansetron (ZOFRAN) 8 MG tablet Take 1 tablet by mouth Every 8 (Eight) Hours As Needed for Nausea.      oxyCODONE (Roxicodone) 5 MG immediate release tablet Take 1 tablet by mouth Every 8 (Eight) Hours As Needed for Severe Pain. 10 tablet 0    Ozempic, 1 MG/DOSE, 4 MG/3ML solution pen-injector Inject 1 mg under the skin into the appropriate area as directed 1 (One) Time Per Week. (Patient not taking: Reported on 4/10/2025)      polyethylene glycol  (MIRALAX) 17 g packet Take 17 g by mouth Daily for 14 days. 14 packet 0    rosuvastatin (CRESTOR) 10 MG tablet Take 0.5 tablets by mouth Daily. (Patient not taking: Reported on 4/24/2025)      Semaglutide, 2 MG/DOSE, (Ozempic, 2 MG/DOSE,) 8 MG/3ML solution pen-injector Inject 2 mg under the skin into the appropriate area as directed 1 (One) Time Per Week. (Patient not taking: Reported on 4/24/2025)      vitamin D (ERGOCALCIFEROL) 1.25 MG (21291 UT) capsule capsule TAKE 1 CAPSULE BY MOUTH ONCE WEEKLY 5 capsule 1     No current facility-administered medications for this visit.      Allergies   Allergen Reactions    Latex Itching    Naproxen GI Intolerance     Stomach ulcers          Nutrition Recall  Eating 2 meals daily   Historically would eat B, L and NO dinner OR B and D without lunch.   The last few days has mostly been eating a B and D meal.   (M1) eggs or oatmeal.   Snacking- Outshine Popsicles sometimes.   Fluid Intake- uses a 40 oz Ron, drinking 3-4 bottles throughout the day. Using water and/or Apple juice after surgery.     Barriers: had hernia repair on April 25th. Still has reduced appetite and eating smaller meals. No diet progression provided by surgeon after surgery.   Her mom has been supportive.       Nutrition Intervention  Nutrition education for morbid obesity. Nutrition coaching and intensive behavioral therapy for behavior change provided.  Strategies used included Comprehensive education  Review of medical weight loss prescription 4 meal/day plan and reviewed nutritional needs for Preoperative Bariatric Surgery.  Self-monitoring strategies such as keeping a food journal (on paper or electronically) and calculating fluid/protein intake were discussed.  Recommend increasing physical activity, beyond normal daily habits, gradually working to reach ~30 minutes daily.     Recommended Diet Changes- Green Prescription Meal Plan  Eat 4 meals per day with protein and vegetables at each meal;  reduce/eliminate Group C carbohydrates after meal 2., Protein goal: 65+ gms., Eat vegetables first at each meal., Discussed protein guidelines for shakes and bars., Reduce snacking -use foods from free foods list only., Reduce fat, sugar, and/or salt in food choices., Choose more nutrient dense foods., Choose foods with increased fiber., Monitor portion sizes using a food scale and/or measuring cup., Eliminate soda and sugar-sweetened beverages, and Increase fluid intake to 64 ounces per day      Goals  1. Encouraged her to work towards 4 eating meals daily. At this time increase volume and fiber slowly; following any directions for diet progression provided by General S[facility] office post hernia repair as needed.  2. Encouraged increasing variety of foods and slowly increasing fiber and volume as tolerated over the next few weeks.    Monitoring/Evaluation Plan  Anticipate follow in 2-3 weeks. Continue collaboration of care with physician and treatment team.     Time Spent 15 minutes    Electronically signed by  Danielle Fam RDN, LD  05/07/2025 13:01 CDT.

## 2025-05-12 ENCOUNTER — OFFICE VISIT (OUTPATIENT)
Dept: OBSTETRICS AND GYNECOLOGY | Age: 30
End: 2025-05-12
Payer: COMMERCIAL

## 2025-05-12 VITALS
WEIGHT: 261.6 LBS | HEIGHT: 66 IN | SYSTOLIC BLOOD PRESSURE: 124 MMHG | DIASTOLIC BLOOD PRESSURE: 72 MMHG | BODY MASS INDEX: 42.04 KG/M2

## 2025-05-12 DIAGNOSIS — N94.6 DYSMENORRHEA: ICD-10-CM

## 2025-05-12 DIAGNOSIS — D25.9 UTERINE LEIOMYOMA, UNSPECIFIED LOCATION: ICD-10-CM

## 2025-05-12 DIAGNOSIS — N83.209 CYST OF OVARY, UNSPECIFIED LATERALITY: ICD-10-CM

## 2025-05-12 DIAGNOSIS — N92.0 MENORRHAGIA WITH REGULAR CYCLE: Primary | ICD-10-CM

## 2025-05-12 PROCEDURE — 1160F RVW MEDS BY RX/DR IN RCRD: CPT | Performed by: NURSE PRACTITIONER

## 2025-05-12 PROCEDURE — 1159F MED LIST DOCD IN RCRD: CPT | Performed by: NURSE PRACTITIONER

## 2025-05-12 PROCEDURE — 99213 OFFICE O/P EST LOW 20 MIN: CPT | Performed by: NURSE PRACTITIONER

## 2025-05-12 RX ORDER — MEDROXYPROGESTERONE ACETATE 10 MG
TABLET ORAL
COMMUNITY
Start: 2025-05-09

## 2025-05-12 NOTE — PROGRESS NOTES
Chief Complaint   Patient presents with    Menorrhagia     Pt here for painful and heavy bleeding with periods. Seen at [facility] 5/9/25 and was given provera to strop bleeding. US done in office today. States the provera did help lighten the bleeding.        History:  Tim Saenz is a 29 y.o. female who presents today for follow-up for evaluation of the above:  Pt comes in originally to follow up on ovarian cyst noted in ER. Also complains of heavy and painful periods which she has been seen here for previously as well. Has tried different forms of birth control in past, but doesn't like the side effects she states she has with them so isn't really wanting any at this time.           ROS:  Review of Systems   Constitutional:  Negative for activity change and unexpected weight loss.   HENT:  Negative for congestion.    Cardiovascular:  Negative for chest pain.   Gastrointestinal:  Negative for blood in stool, constipation and diarrhea.   Endocrine: Negative for cold intolerance and heat intolerance.   Genitourinary:  Positive for menstrual problem. Negative for dyspareunia, pelvic pain and vaginal discharge.   Musculoskeletal:  Negative for arthralgias, back pain, neck pain and neck stiffness.   Skin:  Negative for rash.   Neurological:  Negative for dizziness and headache.   Psychiatric/Behavioral:  Negative for sleep disturbance. The patient is not nervous/anxious.        Ms. Saenz  reports that she has never smoked. She has never been exposed to tobacco smoke. She has never used smokeless tobacco. She reports that she does not currently use alcohol. She reports that she does not use drugs.      Current Outpatient Medications:     acetaminophen (Tylenol) 325 MG tablet, Take 3 tablets by mouth Every 8 (Eight) Hours. Take every 8 hours for 3 days then take prn as needed., Disp: , Rfl:     azelastine (ASTELIN) 0.1 % nasal spray, Administer 2 sprays into the nostril(s) as directed by provider  "2 (Two) Times a Day. Use in each nostril as directed, Disp: 30 mL, Rfl: 2    buPROPion XL (WELLBUTRIN XL) 150 MG 24 hr tablet, Take 1 tablet every day by oral route in the morning for 90 days., Disp: , Rfl:     busPIRone (BUSPAR) 5 MG tablet, Take 1 tablet by mouth 3 (Three) Times a Day. Pt only takes bid, Disp: , Rfl:     [employer] ASHWAGANDHA PO, Take  by mouth As Needed. Gummies, Disp: , Rfl:     cyclobenzaprine (FLEXERIL) 10 MG tablet, , Disp: , Rfl:     docusate sodium (Colace) 100 MG capsule, Take 1 capsule by mouth 2 (Two) Times a Day for 30 days., Disp: 60 capsule, Rfl: 0    fluticasone (FLONASE) 50 MCG/ACT nasal spray, Administer 2 sprays into the nostril(s) as directed by provider Daily., Disp: 16 g, Rfl: 2    FREESTYLE LITE test strip, , Disp: , Rfl:     Lancets (freestyle) lancets, , Disp: , Rfl:     Magnesium 250 MG tablet, Take  by mouth As Needed., Disp: , Rfl:     medroxyPROGESTERone (PROVERA) 10 MG tablet, , Disp: , Rfl:     ondansetron (Zofran) 4 MG tablet, Take 1 tablet by mouth Every 8 (Eight) Hours As Needed for Nausea or Vomiting., Disp: 15 tablet, Rfl: 0    Ozempic, 1 MG/DOSE, 4 MG/3ML solution pen-injector, Inject 1 mg under the skin into the appropriate area as directed 1 (One) Time Per Week., Disp: , Rfl:     Semaglutide, 2 MG/DOSE, (Ozempic, 2 MG/DOSE,) 8 MG/3ML solution pen-injector, Inject 2 mg under the skin into the appropriate area as directed 1 (One) Time Per Week., Disp: , Rfl:     vitamin D (ERGOCALCIFEROL) 1.25 MG (86451 UT) capsule capsule, TAKE 1 CAPSULE BY MOUTH ONCE WEEKLY, Disp: 5 capsule, Rfl: 1      OBJECTIVE:  /72   Ht 167.1 cm (65.79\")   Wt 119 kg (261 lb 9.6 oz)   LMP 05/08/2025 (Exact Date)   BMI 42.49 kg/m²    Physical Exam  Vitals and nursing note reviewed.   Constitutional:       Appearance: She is well-developed.   HENT:      Head: Normocephalic and atraumatic.   Eyes:      General:         Right eye: No discharge.         Left eye: No discharge.      " Conjunctiva/sclera: Conjunctivae normal.   Neck:      Thyroid: No thyromegaly.   Cardiovascular:      Rate and Rhythm: Normal rate and regular rhythm.      Heart sounds: Normal heart sounds. No murmur heard.  Pulmonary:      Effort: Pulmonary effort is normal.      Breath sounds: Normal breath sounds.   Musculoskeletal:      Cervical back: Normal range of motion and neck supple.   Skin:     General: Skin is warm and dry.   Neurological:      Mental Status: She is alert and oriented to person, place, and time.   Psychiatric:         Mood and Affect: Mood normal.         Behavior: Behavior normal.         Thought Content: Thought content normal.         Judgment: Judgment normal.         Assessment/Plan    Diagnoses and all orders for this visit:    1. Menorrhagia with regular cycle (Primary)    2. Dysmenorrhea    3. Cyst of ovary, unspecified laterality    4. Uterine leiomyoma, unspecified location    Pt comes in today to follow up from ER due to ovarian cyst.   Ultrasound today shows no ovarian cyst. Small fibroid 1.6 cm noted.     Pt continues to complain of heavy and painful periods. Has been seen here for this previously but continues to decline wanting hormones to help with them. Hysterectomy had previously been discussed as well but she wanted to preserve fertility. Previous myomectomy might have initially helped periods for a couple of months, but now back to where they were before.    Today declines any treatment. Wishes to monitor periods and think on it until her annual wellness exam. She thought she had it already scheduled with DR. Avitia but it isn't showing up anymore so wishes to schedule that today.     If pt calls to request refill in next couple of months of the provera that she got in ER, it is okay to send if bleeding is comparable to now.        An After Visit Summary was printed and given to the patient at discharge.  Return for schedule annual with Dr. Avitia after 7/10/2025. Sooner if  problems arise.          JEFF Victoria  Electronically Signed

## 2025-05-19 ENCOUNTER — OFFICE VISIT (OUTPATIENT)
Dept: SURGERY | Facility: CLINIC | Age: 30
End: 2025-05-19
Payer: COMMERCIAL

## 2025-05-19 VITALS
SYSTOLIC BLOOD PRESSURE: 120 MMHG | BODY MASS INDEX: 41.95 KG/M2 | HEIGHT: 66 IN | OXYGEN SATURATION: 99 % | HEART RATE: 103 BPM | DIASTOLIC BLOOD PRESSURE: 62 MMHG | WEIGHT: 261 LBS

## 2025-05-19 DIAGNOSIS — Z87.19 S/P REPAIR OF VENTRAL HERNIA: Primary | ICD-10-CM

## 2025-05-19 DIAGNOSIS — Z98.890 S/P REPAIR OF VENTRAL HERNIA: Primary | ICD-10-CM

## 2025-05-19 DIAGNOSIS — Z09 S/P UMBILICAL HERNIA REPAIR, FOLLOW-UP EXAM: ICD-10-CM

## 2025-05-19 NOTE — PROGRESS NOTES
"Patient: Tim Saenz    YOB: 1995    Date: 05/19/2025    Primary Care Provider: Kendra Rosa APRN    Vital Signs:   Vitals:    05/19/25 1310   BP: 120/62   Pulse: 103   SpO2: 99%   Weight: 118 kg (261 lb)   Height: 167.1 cm (65.79\")       Overall doing well s/p right abdominal wall incisional hernia and umbilical hernia repair on 4/25/25 by Dr. Staton. No fevers. Tolerating diet. Energy improving. Pain improving. No troubles with bowel or bladder function. Wounds healing well. Repair intact.    Results Review:   Pathology and operative findings reviewed with patient.    Op Note by Nicolette Staton MD (04/25/2025 07:08)   POSTOPERATIVE DIAGNOSIS: Same  (1) 3.2 cm incarcerated umbilical hernia   (2) 3.8 cm right abdominal wall incisional hernia     Assessment / Plan:  It has been reiterated that the patient should limit strenuous activity during the post op period and limit lifting to <35 pounds for the first four weeks post op then slowly return to normal.    Diagnoses and all orders for this visit:    1. S/P repair of ventral hernia (Primary)    2. S/P umbilical hernia repair, follow-up exam        Tim Saenz is 3 weeks s/p right abdominal wall hernia and umbilical hernia repair. She is overall doing well. At this time, she is able to return to normal activity at 4 weeks post op. This is around 5/23/25. She voiced understanding of this. She will call for an appointment if she has any new problems or concerns. She is agreeable to the plan.     Follow up:     Return if symptoms worsen or fail to improve.      Electronically signed by Beena Amato PA-C  05/19/25  13:27 CDT                  "

## 2025-06-23 ENCOUNTER — RESULTS FOLLOW-UP (OUTPATIENT)
Dept: ONCOLOGY | Facility: CLINIC | Age: 30
End: 2025-06-23
Payer: COMMERCIAL

## 2025-06-23 ENCOUNTER — TELEPHONE (OUTPATIENT)
Dept: ONCOLOGY | Facility: CLINIC | Age: 30
End: 2025-06-23
Payer: COMMERCIAL

## 2025-06-23 NOTE — TELEPHONE ENCOUNTER
Caller: Tim Saenz    Relationship to patient: Self    Best call back number: [phone]     Chief complaint: patient to reschedule 6/24/25 appt    Type of visit: fu1    Requested date: NEXT AVAILABLE

## 2025-06-23 NOTE — PROGRESS NOTES
Her Hgb went down a little.  There are no iron reports with this one.  If we didn't order iron panel and ferritin, I would like one.  Looks like she needs iron. Sat was low last month but Hgb was > 12.

## 2025-06-24 RX ORDER — CETIRIZINE HYDROCHLORIDE 10 MG/1
10 TABLET ORAL ONCE
OUTPATIENT
Start: 2025-06-24

## 2025-06-24 RX ORDER — SODIUM CHLORIDE 9 MG/ML
20 INJECTION, SOLUTION INTRAVENOUS ONCE
OUTPATIENT
Start: 2025-06-24

## 2025-07-01 ENCOUNTER — OFFICE VISIT (OUTPATIENT)
Dept: ONCOLOGY | Facility: CLINIC | Age: 30
End: 2025-07-01
Payer: COMMERCIAL

## 2025-07-01 VITALS
TEMPERATURE: 97.1 F | DIASTOLIC BLOOD PRESSURE: 82 MMHG | HEART RATE: 95 BPM | RESPIRATION RATE: 16 BRPM | SYSTOLIC BLOOD PRESSURE: 126 MMHG | WEIGHT: 256.3 LBS | BODY MASS INDEX: 41.19 KG/M2 | HEIGHT: 66 IN | OXYGEN SATURATION: 98 %

## 2025-07-01 DIAGNOSIS — D50.8 OTHER IRON DEFICIENCY ANEMIA: Primary | ICD-10-CM

## 2025-07-01 PROCEDURE — 1126F AMNT PAIN NOTED NONE PRSNT: CPT | Performed by: NURSE PRACTITIONER

## 2025-07-01 PROCEDURE — 99214 OFFICE O/P EST MOD 30 MIN: CPT | Performed by: NURSE PRACTITIONER

## 2025-07-01 NOTE — PROGRESS NOTES
[facility] HEMATOLOGY & ONCOLOGY [place]  [address]  [place] [address] [phone]    Patient Name: Tim Saenz  Encounter Date: 07/01/2025   YOB: 1995  Patient Number: [id number]    PROGRESS NOTE    HISTORY OF PRESENT ILLNESS: Tim Saenz is a 30 y.o. female followed by this office for iron deficiency anemia. History is obtained from patient. History is considered to be accurate.    She has health history significant for DM and iron deficiency, depression/anxiety. She also reports frequent menstruation r/t fibroids which is managed by GYN.  She is taking oral iron once daily.     Scheduled for myomectomy March 11, 2024.    She had Venofer 200 mg on Feb 16, & 29 2024.  She reports some muscle pain after her first infusion.   Had myomectomy March 11, 2024. She continues to have some heavy bleeding but states is has improved.    She received Venofer 200 mg on June 14, 21, 28, July 5, 26, August 2, 2024  Feraheme 510 mg on Jan 16, 2025.        I have reviewed the HPI and verified with the patient the accuracy of it. No changes to history since the information was documented.  Cecy Jin, APRN.  07/01/2025     INTERVAL HISTORY      Pt presents to clinic today for continued follow up.  She has no acute complaints today.  She had Venofer 300 mg on April 11, 18, 23, 2025. She has no acute complaints today.        LABS    Lab Results - Last 18 Months   Lab Units 04/18/25  1025 08/29/24  0834 05/31/24  0903 02/29/24  0914 01/11/24  0926   HEMOGLOBIN g/dL 10.9* 13.1 11.6* 13.1 12.8   HEMATOCRIT % 36.9 42.2 38.5 41.5 41.6   MCV fL 77.0* 78.7* 75.6* 81.5 82.7   WBC 10*3/mm3 5.13 4.46 4.38 4.20 3.40   RDW % 18.0* 17.2* 13.8 12.5 13.0   MPV fL 9.2 8.9 9.3 8.8 8.9   PLATELETS 10*3/mm3 365 309 336 308 261   IMM GRAN % % 0.2 0.2 0.2 0.2  --    NEUTROS ABS 10*3/mm3 3.13 1.73 1.86 1.63* 1.70   LYMPHS ABS 10*3/mm3 1.52 2.20 1.94 2.04  --  "   MONOS ABS 10*3/mm3 0.32 0.35 0.41 0.33  --    EOS ABS 10*3/mm3 0.11 0.12 0.11 0.14 0.07   BASOS ABS 10*3/mm3 0.04 0.05 0.05 0.05  --    IMMATURE GRANS (ABS) 10*3/mm3 0.01 0.01 0.01 0.01  --    NRBC /100 WBC 0.0 0.0 0.0 0.0  --    NEUTROPHIL % %  --   --   --   --  49.0   MONOCYTES % %  --   --   --   --  7.0   ATYP LYMPH % %  --   --   --   --  7.0*   GIANT PLT   --   --   --   --  Slight/1+       Lab Results - Last 18 Months   Lab Units 04/18/25  1025 08/29/24  0834 05/31/24  0903 02/29/24  0914 01/11/24  0926   GLUCOSE mg/dL 109* 89 77 84 86   SODIUM mmol/L 139 137 138 137 138   POTASSIUM mmol/L 3.9 4.4 4.2 4.3 3.8   CO2 mmol/L 25.0 28.0 24.0 27.0 26.0   CHLORIDE mmol/L 106 103 103 102 105   ANION GAP mmol/L 8.0 6.0 11.0 8.0 7.0   CREATININE mg/dL 0.57 0.54* 0.58 0.69 0.64   BUN mg/dL 7 7 5* 8 6   BUN / CREAT RATIO  12.3 13.0 8.6 11.6 9.4   CALCIUM mg/dL 8.7 9.1 8.5* 8.4* 8.4*   ALK PHOS U/L 70 65 67 62 66   TOTAL PROTEIN g/dL 7.0 7.2 7.0 7.0 7.2   ALT (SGPT) U/L 8 6 <5 6 6   AST (SGOT) U/L 11 11 10 11 11   BILIRUBIN mg/dL 0.2 0.4 0.5 0.4 0.3   ALBUMIN g/dL 3.9 4.1 4.0 3.9 3.9   GLOBULIN gm/dL 3.1 3.1 3.0 3.1 3.3       No results for input(s): \"MSPIKE\", \"KAPPALAMB\", \"IGLFLC\", \"URICACID\", \"FREEKAPPAL\", \"CEA\", \"LDH\", \"REFLABREPO\" in the last 32486 hours.    Lab Results - Last 18 Months   Lab Units 08/29/24  0834 05/31/24  0903 02/29/24  0914 01/11/24  0926   IRON mcg/dL  --  42 41 35*   TIBC mcg/dL  --  446 396 349   IRON SATURATION (TSAT) %  --  9* 10* 10*   FERRITIN ng/mL  --  8.63* 38.83 41.01   FOLATE ng/mL 6.25  --   --   --          PAST MEDICAL HISTORY:  ALLERGIES:  Allergies   Allergen Reactions    Latex Itching    Naproxen GI Intolerance     Stomach ulcers     CURRENT MEDICATIONS:  Outpatient Encounter Medications as of 7/1/2025   Medication Sig Dispense Refill    acetaminophen (Tylenol) 325 MG tablet Take 3 tablets by mouth Every 8 (Eight) Hours. Take every 8 hours for 3 days then take prn as needed.   "    azelastine (ASTELIN) 0.1 % nasal spray Administer 2 sprays into the nostril(s) as directed by provider 2 (Two) Times a Day. Use in each nostril as directed 30 mL 2    buPROPion XL (WELLBUTRIN XL) 150 MG 24 hr tablet Take 1 tablet every day by oral route in the morning for 90 days.      busPIRone (BUSPAR) 5 MG tablet Take 1 tablet by mouth 3 (Three) Times a Day. Pt only takes bid      [employer] ASHWAGANDHA PO Take  by mouth As Needed. Gummies      cyclobenzaprine (FLEXERIL) 10 MG tablet       fluticasone (FLONASE) 50 MCG/ACT nasal spray Administer 2 sprays into the nostril(s) as directed by provider Daily. 16 g 2    FREESTYLE LITE test strip       Lancets (freestyle) lancets       Magnesium 250 MG tablet Take  by mouth As Needed.      medroxyPROGESTERone (PROVERA) 10 MG tablet       ondansetron (Zofran) 4 MG tablet Take 1 tablet by mouth Every 8 (Eight) Hours As Needed for Nausea or Vomiting. 15 tablet 0    Semaglutide, 2 MG/DOSE, (Ozempic, 2 MG/DOSE,) 8 MG/3ML solution pen-injector Inject 2 mg under the skin into the appropriate area as directed 1 (One) Time Per Week.      vitamin D (ERGOCALCIFEROL) 1.25 MG (60644 UT) capsule capsule TAKE 1 CAPSULE BY MOUTH ONCE WEEKLY 5 capsule 1    [DISCONTINUED] Ozempic, 1 MG/DOSE, 4 MG/3ML solution pen-injector Inject 1 mg under the skin into the appropriate area as directed 1 (One) Time Per Week.       No facility-administered encounter medications on file as of 7/1/2025.     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    Incisional hernia, without obstruction or gangrene K43.2    Umbilical hernia without obstruction and without gangrene K42.9    Malabsorption of iron K90.9    Class 3 severe obesity due to excess calories with serious comorbidity and body mass index  "(BMI) of 40.0 to 44.9 in adult E66.813, Z68.41       HEALTH MAINTENANCE ITEMS:  Health Maintenance Due   Topic Date Due    Hepatitis B (2 of 3 - 3-dose series) 1995    DIABETIC FOOT EXAM  Never done    DIABETIC EYE EXAM  Never done    URINE MICROALBUMIN-CREATININE RATIO (uACR)  Never done    Pneumococcal Vaccine 0-49 (1 of 2 - PCV) Never done    TDAP/TD VACCINES (1 - Tdap) Never done    HEPATITIS C SCREENING  Never done    ANNUAL PHYSICAL  Never done    COVID-19 Vaccine (3 - 2024-25 season) 09/01/2024    HEMOGLOBIN A1C  10/24/2024       <no information>  Last Completed Colonoscopy    This patient has no relevant Health Maintenance data.       Immunization History   Administered Date(s) Administered    COVID-19 (MODERNA) 1st,2nd,3rd Dose Monovalent 07/02/2021, 07/30/2021     Last Completed Mammogram    This patient has no relevant Health Maintenance data.           FAMILY HISTORY:  Family History   Problem Relation Age of Onset    Uterine cancer Mother 29    Breast cancer Paternal Grandmother     Breast cancer Maternal Aunt      SOCIAL HISTORY:  Social History     Socioeconomic History    Marital status: Single   Tobacco Use    Smoking status: Never     Passive exposure: Never    Smokeless tobacco: Never   Vaping Use    Vaping status: Never Used   Substance and Sexual Activity    Alcohol use: Not Currently    Drug use: Never    Sexual activity: Never     Birth control/protection: None       REVIEW OF SYSTEMS:  Review of Systems   Constitutional:  Negative for fatigue and fever.        \"I feel alright\"   HENT:  Negative for trouble swallowing.    Respiratory:  Negative for cough and shortness of breath.    Cardiovascular:  Negative for chest pain, palpitations and leg swelling.   Gastrointestinal:  Negative for nausea and vomiting.   Endocrine: Positive for polyuria.   Genitourinary:  Positive for menstrual problem (Menorrhagia. ). Negative for hematuria.        Fibroids   Musculoskeletal:  Negative for " "arthralgias and myalgias.   Skin:  Negative for rash, skin lesions and wound.   Neurological:  Positive for headache. Negative for dizziness, syncope, memory problem and confusion.   Psychiatric/Behavioral:  Positive for depressed mood (Now on Welbutrin). Negative for suicidal ideas. The patient is nervous/anxious.      I have reviewed the ROS and verified with the patient the accuracy of it. No changes since the information was documented.  Cecy Jin, APRN 07/01/2025       /82   Pulse 95   Temp 97.1 °F (36.2 °C)   Resp 16   Ht 167.6 cm (66\")   Wt 116 kg (256 lb 4.8 oz)   SpO2 98%   BMI 41.37 kg/m²  Body surface area is 2.22 meters squared.    Pain Score    07/01/25 0929   PainSc: 0-No pain       Physical Exam  Constitutional:       Appearance: Normal appearance.   HENT:      Head: Normocephalic and atraumatic.   Cardiovascular:      Rate and Rhythm: Normal rate and regular rhythm.   Pulmonary:      Effort: Pulmonary effort is normal.      Breath sounds: Normal breath sounds.   Abdominal:      General: Bowel sounds are normal.      Palpations: Abdomen is soft.   Musculoskeletal:      Right lower leg: No edema.      Left lower leg: No edema.   Skin:     General: Skin is warm and dry.   Neurological:      Mental Status: She is alert and oriented to person, place, and time.   Psychiatric:         Attention and Perception: Attention normal.         Mood and Affect: Mood normal.         Judgment: Judgment normal.         Tim Saenz reports a pain score of 0.  Given her pain assessment as noted, treatment options were discussed and the following options were decided upon as a follow-up plan to address the patient's pain: No intervention indicated..      ASSESSMENT / PLAN:       1. Other iron deficiency anemia         Iron Deficiency   -states had nausea with iron   -iron 30, Sat 9, Ferritin 18, Hgb 11.9, 38.3  -Will order Venofer 300 mg weekly x 3 Will premed with zofran      Menorrhagia   -Advised " follow up with GYN      Stable for observation  Continue current medications, treatment plans and follow up with PCP and any other providers   Receheck labs q 6 weeks x 3 RTC 6 month  Care discussed with patient.  Understanding expressed.  Patient agreeable with plan.            Cecy Jin, JEFF  07/01/2025

## 2025-07-08 ENCOUNTER — INFUSION (OUTPATIENT)
Dept: ONCOLOGY | Facility: HOSPITAL | Age: 30
End: 2025-07-08
Payer: COMMERCIAL

## 2025-07-08 VITALS
RESPIRATION RATE: 18 BRPM | WEIGHT: 258 LBS | DIASTOLIC BLOOD PRESSURE: 86 MMHG | HEIGHT: 66 IN | SYSTOLIC BLOOD PRESSURE: 125 MMHG | HEART RATE: 88 BPM | BODY MASS INDEX: 41.46 KG/M2 | OXYGEN SATURATION: 100 % | TEMPERATURE: 97.1 F

## 2025-07-08 DIAGNOSIS — K90.9 MALABSORPTION OF IRON: ICD-10-CM

## 2025-07-08 DIAGNOSIS — D50.8 OTHER IRON DEFICIENCY ANEMIA: Primary | ICD-10-CM

## 2025-07-08 PROCEDURE — 96365 THER/PROPH/DIAG IV INF INIT: CPT

## 2025-07-08 PROCEDURE — 96366 THER/PROPH/DIAG IV INF ADDON: CPT

## 2025-07-08 PROCEDURE — 25010000002 IRON SUCROSE PER 1 MG: Performed by: NURSE PRACTITIONER

## 2025-07-08 PROCEDURE — 25810000003 SODIUM CHLORIDE 0.9 % SOLUTION: Performed by: NURSE PRACTITIONER

## 2025-07-08 RX ORDER — FAMOTIDINE 10 MG/ML
20 INJECTION, SOLUTION INTRAVENOUS AS NEEDED
OUTPATIENT
Start: 2025-07-09

## 2025-07-08 RX ORDER — CETIRIZINE HYDROCHLORIDE 10 MG/1
10 TABLET ORAL ONCE
Status: DISCONTINUED | OUTPATIENT
Start: 2025-07-08 | End: 2025-07-08 | Stop reason: HOSPADM

## 2025-07-08 RX ORDER — HYDROCORTISONE SODIUM SUCCINATE 100 MG/2ML
100 INJECTION INTRAMUSCULAR; INTRAVENOUS AS NEEDED
OUTPATIENT
Start: 2025-07-09

## 2025-07-08 RX ORDER — CETIRIZINE HYDROCHLORIDE 10 MG/1
10 TABLET ORAL ONCE
OUTPATIENT
Start: 2025-07-09

## 2025-07-08 RX ORDER — SODIUM CHLORIDE 9 MG/ML
20 INJECTION, SOLUTION INTRAVENOUS ONCE
OUTPATIENT
Start: 2025-07-09

## 2025-07-08 RX ORDER — HYDROCORTISONE SODIUM SUCCINATE 100 MG/2ML
100 INJECTION INTRAMUSCULAR; INTRAVENOUS AS NEEDED
Status: DISCONTINUED | OUTPATIENT
Start: 2025-07-08 | End: 2025-07-08 | Stop reason: HOSPADM

## 2025-07-08 RX ORDER — SODIUM CHLORIDE 9 MG/ML
20 INJECTION, SOLUTION INTRAVENOUS ONCE
Status: COMPLETED | OUTPATIENT
Start: 2025-07-08 | End: 2025-07-08

## 2025-07-08 RX ORDER — FAMOTIDINE 10 MG/ML
20 INJECTION, SOLUTION INTRAVENOUS AS NEEDED
Status: DISCONTINUED | OUTPATIENT
Start: 2025-07-08 | End: 2025-07-08 | Stop reason: HOSPADM

## 2025-07-08 RX ORDER — DIPHENHYDRAMINE HYDROCHLORIDE 50 MG/ML
50 INJECTION, SOLUTION INTRAMUSCULAR; INTRAVENOUS AS NEEDED
OUTPATIENT
Start: 2025-07-09

## 2025-07-08 RX ORDER — DIPHENHYDRAMINE HYDROCHLORIDE 50 MG/ML
50 INJECTION, SOLUTION INTRAMUSCULAR; INTRAVENOUS AS NEEDED
Status: DISCONTINUED | OUTPATIENT
Start: 2025-07-08 | End: 2025-07-08 | Stop reason: HOSPADM

## 2025-07-08 RX ADMIN — SODIUM CHLORIDE 20 ML/HR: 9 INJECTION, SOLUTION INTRAVENOUS at 12:31

## 2025-07-08 RX ADMIN — IRON SUCROSE 300 MG: 20 INJECTION, SOLUTION INTRAVENOUS at 12:39

## 2025-07-15 ENCOUNTER — INFUSION (OUTPATIENT)
Dept: ONCOLOGY | Facility: HOSPITAL | Age: 30
End: 2025-07-15
Payer: COMMERCIAL

## 2025-07-15 VITALS
OXYGEN SATURATION: 99 % | TEMPERATURE: 97.4 F | DIASTOLIC BLOOD PRESSURE: 92 MMHG | HEART RATE: 95 BPM | SYSTOLIC BLOOD PRESSURE: 130 MMHG | RESPIRATION RATE: 16 BRPM

## 2025-07-15 DIAGNOSIS — K90.9 MALABSORPTION OF IRON: ICD-10-CM

## 2025-07-15 DIAGNOSIS — D50.8 OTHER IRON DEFICIENCY ANEMIA: Primary | ICD-10-CM

## 2025-07-15 PROCEDURE — 96366 THER/PROPH/DIAG IV INF ADDON: CPT

## 2025-07-15 PROCEDURE — 96365 THER/PROPH/DIAG IV INF INIT: CPT

## 2025-07-15 PROCEDURE — 25810000003 SODIUM CHLORIDE 0.9 % SOLUTION: Performed by: NURSE PRACTITIONER

## 2025-07-15 PROCEDURE — 25010000002 IRON SUCROSE PER 1 MG: Performed by: NURSE PRACTITIONER

## 2025-07-15 RX ORDER — SODIUM CHLORIDE 9 MG/ML
20 INJECTION, SOLUTION INTRAVENOUS ONCE
Status: COMPLETED | OUTPATIENT
Start: 2025-07-15 | End: 2025-07-15

## 2025-07-15 RX ORDER — DIPHENHYDRAMINE HYDROCHLORIDE 50 MG/ML
50 INJECTION, SOLUTION INTRAMUSCULAR; INTRAVENOUS AS NEEDED
OUTPATIENT
Start: 2025-07-16

## 2025-07-15 RX ORDER — SODIUM CHLORIDE 9 MG/ML
20 INJECTION, SOLUTION INTRAVENOUS ONCE
OUTPATIENT
Start: 2025-07-16

## 2025-07-15 RX ORDER — CETIRIZINE HYDROCHLORIDE 10 MG/1
10 TABLET ORAL ONCE
OUTPATIENT
Start: 2025-07-16

## 2025-07-15 RX ORDER — FAMOTIDINE 10 MG/ML
20 INJECTION, SOLUTION INTRAVENOUS AS NEEDED
OUTPATIENT
Start: 2025-07-16

## 2025-07-15 RX ORDER — CETIRIZINE HYDROCHLORIDE 10 MG/1
10 TABLET ORAL ONCE
Status: DISCONTINUED | OUTPATIENT
Start: 2025-07-15 | End: 2025-07-15 | Stop reason: HOSPADM

## 2025-07-15 RX ORDER — HYDROCORTISONE SODIUM SUCCINATE 100 MG/2ML
100 INJECTION INTRAMUSCULAR; INTRAVENOUS AS NEEDED
OUTPATIENT
Start: 2025-07-16

## 2025-07-15 RX ADMIN — SODIUM CHLORIDE 300 MG: 9 INJECTION, SOLUTION INTRAVENOUS at 13:44

## 2025-07-15 RX ADMIN — SODIUM CHLORIDE 20 ML/HR: 9 INJECTION, SOLUTION INTRAVENOUS at 13:24

## 2025-07-22 ENCOUNTER — INFUSION (OUTPATIENT)
Dept: ONCOLOGY | Facility: HOSPITAL | Age: 30
End: 2025-07-22
Payer: COMMERCIAL

## 2025-07-22 VITALS
BODY MASS INDEX: 43.92 KG/M2 | RESPIRATION RATE: 16 BRPM | OXYGEN SATURATION: 100 % | HEIGHT: 65 IN | DIASTOLIC BLOOD PRESSURE: 81 MMHG | HEART RATE: 96 BPM | SYSTOLIC BLOOD PRESSURE: 125 MMHG | TEMPERATURE: 98.1 F | WEIGHT: 263.6 LBS

## 2025-07-22 DIAGNOSIS — D50.8 OTHER IRON DEFICIENCY ANEMIA: Primary | ICD-10-CM

## 2025-07-22 DIAGNOSIS — K90.9 MALABSORPTION OF IRON: ICD-10-CM

## 2025-07-22 PROCEDURE — 96365 THER/PROPH/DIAG IV INF INIT: CPT

## 2025-07-22 PROCEDURE — 96366 THER/PROPH/DIAG IV INF ADDON: CPT

## 2025-07-22 PROCEDURE — 25810000003 SODIUM CHLORIDE 0.9 % SOLUTION: Performed by: NURSE PRACTITIONER

## 2025-07-22 PROCEDURE — 25010000002 IRON SUCROSE PER 1 MG: Performed by: NURSE PRACTITIONER

## 2025-07-22 RX ORDER — CETIRIZINE HYDROCHLORIDE 10 MG/1
10 TABLET ORAL ONCE
Status: CANCELLED | OUTPATIENT
Start: 2025-07-22

## 2025-07-22 RX ORDER — DIPHENHYDRAMINE HYDROCHLORIDE 50 MG/ML
50 INJECTION, SOLUTION INTRAMUSCULAR; INTRAVENOUS AS NEEDED
OUTPATIENT
Start: 2025-07-22

## 2025-07-22 RX ORDER — HYDROCORTISONE SODIUM SUCCINATE 100 MG/2ML
100 INJECTION INTRAMUSCULAR; INTRAVENOUS AS NEEDED
Status: DISCONTINUED | OUTPATIENT
Start: 2025-07-22 | End: 2025-07-22 | Stop reason: HOSPADM

## 2025-07-22 RX ORDER — SODIUM CHLORIDE 9 MG/ML
20 INJECTION, SOLUTION INTRAVENOUS ONCE
Status: COMPLETED | OUTPATIENT
Start: 2025-07-22 | End: 2025-07-22

## 2025-07-22 RX ORDER — FAMOTIDINE 10 MG/ML
20 INJECTION, SOLUTION INTRAVENOUS AS NEEDED
OUTPATIENT
Start: 2025-07-22

## 2025-07-22 RX ORDER — SODIUM CHLORIDE 9 MG/ML
20 INJECTION, SOLUTION INTRAVENOUS ONCE
Status: CANCELLED | OUTPATIENT
Start: 2025-07-22

## 2025-07-22 RX ORDER — HYDROCORTISONE SODIUM SUCCINATE 100 MG/2ML
100 INJECTION INTRAMUSCULAR; INTRAVENOUS AS NEEDED
OUTPATIENT
Start: 2025-07-22

## 2025-07-22 RX ORDER — FAMOTIDINE 10 MG/ML
20 INJECTION, SOLUTION INTRAVENOUS AS NEEDED
Status: DISCONTINUED | OUTPATIENT
Start: 2025-07-22 | End: 2025-07-22 | Stop reason: HOSPADM

## 2025-07-22 RX ORDER — DIPHENHYDRAMINE HYDROCHLORIDE 50 MG/ML
50 INJECTION, SOLUTION INTRAMUSCULAR; INTRAVENOUS AS NEEDED
Status: DISCONTINUED | OUTPATIENT
Start: 2025-07-22 | End: 2025-07-22 | Stop reason: HOSPADM

## 2025-07-22 RX ORDER — CETIRIZINE HYDROCHLORIDE 10 MG/1
10 TABLET ORAL ONCE
Status: DISCONTINUED | OUTPATIENT
Start: 2025-07-22 | End: 2025-07-22 | Stop reason: HOSPADM

## 2025-07-22 RX ADMIN — SODIUM CHLORIDE 20 ML/HR: 9 INJECTION, SOLUTION INTRAVENOUS at 13:49

## 2025-07-22 RX ADMIN — SODIUM CHLORIDE 300 MG: 9 INJECTION, SOLUTION INTRAVENOUS at 13:49

## (undated) DEVICE — TIP COVER ACCESSORY

## (undated) DEVICE — GLOVE,SURG,SENSICARE,ALOE,LF,PF,6: Brand: MEDLINE

## (undated) DEVICE — KT CLN CLEANOR SCPE

## (undated) DEVICE — ANTIBACTERIAL UNDYED BRAIDED (POLYGLACTIN 910), SYNTHETIC ABSORBABLE SUTURE: Brand: COATED VICRYL

## (undated) DEVICE — MICRO HVTSA, 0.5G AND HVTSA SOURCEMARK PRODUCT CODE M1206 AND M1206-01: Brand: EXOFIN MICRO HVTSA, 0.5G

## (undated) DEVICE — SEAL

## (undated) DEVICE — GLV SURG SENSICARE W/ALOE PF LF 6.5 STRL

## (undated) DEVICE — ARM DRAPE

## (undated) DEVICE — ELECTRD BLD EZ CLN MOD XLNG 2.75IN

## (undated) DEVICE — TROC BLADLES ANCHORPORT/OPTI LP 8X120MM 1P/U

## (undated) DEVICE — WOUND RETRACTOR AND PROTECTOR: Brand: ALEXIS O WOUND PROTECTOR-RETRACTOR

## (undated) DEVICE — NEEDLE,22GX1.5",REG,BEVEL: Brand: MEDLINE

## (undated) DEVICE — TBG INSUFFLATION LUER LOCK: Brand: MEDLINE INDUSTRIES, INC.

## (undated) DEVICE — PK POSTN TRENGUARD450 PROC

## (undated) DEVICE — PATIENT RETURN ELECTRODE, SINGLE-USE, CONTACT QUALITY MONITORING, ADULT, WITH 9FT CORD, FOR PATIENTS WEIGING OVER 33LBS. (15KG): Brand: MEGADYNE

## (undated) DEVICE — SUT MNCRYL 4/0 PS2 27IN UD MCP426H

## (undated) DEVICE — CLTH CLENS READYCLEANSE PERI CARE PK/5

## (undated) DEVICE — SUT VIC 0 UR6 27IN VCP603H

## (undated) DEVICE — DAVINCI: Brand: MEDLINE INDUSTRIES, INC.

## (undated) DEVICE — ST TBG AIRSEAL FLTR TRI LUM

## (undated) DEVICE — SYR LUERLOK 30CC

## (undated) DEVICE — BNDR ABD 4PANEL 12IN 63 TO 74IN

## (undated) DEVICE — TOTAL TRAY, 16FR 10ML SIL FOLEY, URN: Brand: MEDLINE

## (undated) DEVICE — ADHS SKIN PREMIERPRO EXOFIN TOPICAL HI/VISC .5ML

## (undated) DEVICE — ENDOPOUCH RETRIEVER SPECIMEN RETRIEVAL BAGS: Brand: ENDOPOUCH RETRIEVER

## (undated) DEVICE — GAUZE,SPONGE,2"X2",8PLY,STERILE,LF,2'S: Brand: MEDLINE

## (undated) DEVICE — MANIP UTER ELEVATOR/PRO W/LNG/HNDL CERV/CUP 35MM MD

## (undated) DEVICE — 4-PORT MANIFOLD: Brand: NEPTUNE 2

## (undated) DEVICE — BLADELESS OBTURATOR: Brand: WECK VISTA

## (undated) DEVICE — APPL HEMO ENDO SURGICEL 2IN1 1P/U STRL

## (undated) DEVICE — GLV SURG DERMASSURE GRN LF PF 7.0